# Patient Record
Sex: MALE | Race: BLACK OR AFRICAN AMERICAN | NOT HISPANIC OR LATINO | Employment: FULL TIME | URBAN - METROPOLITAN AREA
[De-identification: names, ages, dates, MRNs, and addresses within clinical notes are randomized per-mention and may not be internally consistent; named-entity substitution may affect disease eponyms.]

---

## 2018-05-15 ENCOUNTER — HOSPITAL ENCOUNTER (OUTPATIENT)
Dept: RADIOLOGY | Facility: HOSPITAL | Age: 53
Discharge: HOME | End: 2018-05-15
Attending: INTERNAL MEDICINE
Payer: COMMERCIAL

## 2018-05-15 ENCOUNTER — OFFICE VISIT (OUTPATIENT)
Dept: INTERNAL MEDICINE | Facility: CLINIC | Age: 53
End: 2018-05-15
Payer: COMMERCIAL

## 2018-05-15 ENCOUNTER — TELEPHONE (OUTPATIENT)
Dept: INTERNAL MEDICINE | Facility: CLINIC | Age: 53
End: 2018-05-15

## 2018-05-15 VITALS
HEIGHT: 74 IN | HEART RATE: 59 BPM | BODY MASS INDEX: 24.36 KG/M2 | OXYGEN SATURATION: 98 % | WEIGHT: 189.81 LBS | SYSTOLIC BLOOD PRESSURE: 138 MMHG | DIASTOLIC BLOOD PRESSURE: 84 MMHG | TEMPERATURE: 98 F

## 2018-05-15 DIAGNOSIS — M54.9 BACK PAIN, UNSPECIFIED BACK LOCATION, UNSPECIFIED BACK PAIN LATERALITY, UNSPECIFIED CHRONICITY: Primary | ICD-10-CM

## 2018-05-15 DIAGNOSIS — M54.9 BACK PAIN, UNSPECIFIED BACK LOCATION, UNSPECIFIED BACK PAIN LATERALITY, UNSPECIFIED CHRONICITY: ICD-10-CM

## 2018-05-15 LAB
BILIRUB SERPL-MCNC: NORMAL MG/DL
BLOOD URINE, POC: NORMAL
COLOR, POC UA: YELLOW
GLUCOSE UR QL STRIP: NORMAL
KETONES UR QL STRIP: NORMAL
LEUKOCYTE ESTERASE URINE, POC: NORMAL
NITRITE, POC UA: NORMAL
PH, POC UA: 5
PROTEIN, POC: NORMAL
SPECIFIC GRAVITY, POC UA: 1.02
UROBILINOGEN, POC UA: NORMAL

## 2018-05-15 PROCEDURE — 72110 X-RAY EXAM L-2 SPINE 4/>VWS: CPT | Mod: 26,,, | Performed by: RADIOLOGY

## 2018-05-15 PROCEDURE — 72110 X-RAY EXAM L-2 SPINE 4/>VWS: CPT | Mod: TC

## 2018-05-15 PROCEDURE — 99202 OFFICE O/P NEW SF 15 MIN: CPT | Mod: 25,S$GLB,, | Performed by: INTERNAL MEDICINE

## 2018-05-15 PROCEDURE — 99999 PR PBB SHADOW E&M-EST. PATIENT-LVL IV: CPT | Mod: PBBFAC,,, | Performed by: INTERNAL MEDICINE

## 2018-05-15 PROCEDURE — 3008F BODY MASS INDEX DOCD: CPT | Mod: CPTII,S$GLB,, | Performed by: INTERNAL MEDICINE

## 2018-05-15 PROCEDURE — 81002 URINALYSIS NONAUTO W/O SCOPE: CPT | Mod: S$GLB,,, | Performed by: INTERNAL MEDICINE

## 2018-05-15 RX ORDER — CYCLOBENZAPRINE HCL 10 MG
10 TABLET ORAL NIGHTLY
Qty: 30 TABLET | Refills: 1 | Status: SHIPPED | OUTPATIENT
Start: 2018-05-15 | End: 2018-06-14
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
## 2018-05-16 ENCOUNTER — TELEPHONE (OUTPATIENT)
Dept: INTERNAL MEDICINE | Facility: CLINIC | Age: 53
End: 2018-05-16
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
## 2018-05-28 ENCOUNTER — OFFICE VISIT (OUTPATIENT)
Dept: SPINE | Facility: CLINIC | Age: 53
End: 2018-05-28
Payer: COMMERCIAL

## 2018-05-28 VITALS
HEART RATE: 72 BPM | DIASTOLIC BLOOD PRESSURE: 101 MMHG | HEIGHT: 74 IN | WEIGHT: 189 LBS | SYSTOLIC BLOOD PRESSURE: 160 MMHG | BODY MASS INDEX: 24.26 KG/M2

## 2018-05-28 DIAGNOSIS — M43.10 ACQUIRED SPONDYLOLISTHESIS: ICD-10-CM

## 2018-05-28 DIAGNOSIS — G89.29 CHRONIC BILATERAL LOW BACK PAIN WITHOUT SCIATICA: ICD-10-CM

## 2018-05-28 DIAGNOSIS — M47.816 SPONDYLOSIS OF LUMBAR REGION WITHOUT MYELOPATHY OR RADICULOPATHY: ICD-10-CM

## 2018-05-28 DIAGNOSIS — M51.37 DDD (DEGENERATIVE DISC DISEASE), LUMBOSACRAL: ICD-10-CM

## 2018-05-28 DIAGNOSIS — M54.50 CHRONIC BILATERAL LOW BACK PAIN WITHOUT SCIATICA: ICD-10-CM

## 2018-05-28 PROCEDURE — 99999 PR PBB SHADOW E&M-EST. PATIENT-LVL III: CPT | Mod: PBBFAC,,, | Performed by: PHYSICIAN ASSISTANT

## 2018-05-28 PROCEDURE — 99203 OFFICE O/P NEW LOW 30 MIN: CPT | Mod: S$GLB,,, | Performed by: PHYSICIAN ASSISTANT

## 2018-05-28 PROCEDURE — 3008F BODY MASS INDEX DOCD: CPT | Mod: CPTII,S$GLB,, | Performed by: PHYSICIAN ASSISTANT

## 2018-05-28 NOTE — PATIENT INSTRUCTIONS
Treatment options for your back pain include:     - Revisiting physical therapy (not Healthy Back program). They can do exercises but also do treatments such as dry needling, ultrasound, and electrical stimulation.     - Consider injections- facet injections at last two levels. I have printed out information about these.     - Continue on current medications and not do anything else.       Let me know if you have any further questions.     Facet Joint Injection  Back or neck pain may be caused by a problem with your facet joints. If so, a facet joint injection may help. With this treatment, medicine is injected into certain facet joints. The injection can help your doctor find problem joints. It may also relieve your pain.    What is a facet joint?  Bones called vertebrae make up your spine. Each vertebra has facets (flat surfaces) that touch where the vertebrae fit together. These form a structure called a facet joint on each side of the vertebrae.  What is a facet joint injection?  One or more facet joints in your back or neck can become inflamed (swollen and irritated). This may cause pain. During a facet joint injection, medicine is injected into the inflamed joints. This treatment may help reduce inflammation and relieve pain. Pain relief may last for weeks to months or longer. If the pain returns, you may need a repeat injection.    Getting ready  To get ready for your treatment, do the following:  · At least a week before treatment, tell your healthcare provider what medicines you take. This includes aspirin. Ask whether you should stop taking any of them before treatment.  · Tell your provider if you are pregnant or allergic to any medicines.  · Follow any directions you are given for not eating or drinking before the treatment.  · If asked, bring X-rays, MRIs, or other tests with you on the day of your treatment.  Date Last Reviewed: 9/21/2015  © 2564-6221 [employer]. [address] [place] [address],  [place] [address]. All rights reserved. This information is not intended as a substitute for professional medical care. Always follow your healthcare professional's instructions.        Facet Joint Injection: Your Experience  The treatment is done in a hospital, surgery center, or a doctor's office. Youll be asked to fill out some forms, including a consent form. You may also be examined.  During the procedure  You may be given medicine to help you relax. You will lie on an exam table on your stomach. During your treatment:  · The skin over the injection site is cleaned. A pain medicine (local anesthetic) numbs the skin.  · X-ray imaging (fluoroscopy) may be used to help the doctor see your spine. If so, a contrast dye may be injected into the affected area.  · The injection is given. It may contain a local anesthetic to numb the region around the joint, medicine that eases inflammation (steroids), or both.  After the procedure  Most often, you can go home shortly after the procedure, generally in about an hour. Have an adult friend or relative drive you. The anesthetic wears off in a few hours. When it does, your back or neck may feel more sore than usual. This is normal. Take it easy for the rest of the day. The steroids most often begin to work in a few days. Your provider can tell you when its OK to go back to work.  Risks and complications  Risks and complications are rare, but can include:  · Infection  · Bleeding  · Prolonged increase in pain  · Nerve damage (very rare)   When to call your healthcare provider  Call your provider if you have any of these:  · Severe headaches  · Fever over 100.4°F (38°C), chills, redness or drainage at the injection site  · Weakness, tingling, or numbness in your arms or legs   Date Last Reviewed: 9/21/2015 © 2000-2017 [employer]. [address] [place] [address], [place] [address]. All rights reserved. This information is not intended as a substitute for professional  medical care. Always follow your healthcare professional's instructions.

## 2018-05-28 NOTE — LETTER
May 28, 2018      Kristan Gentile MD  [address]  [facility] [address]           [facility]  [address], [address]  [facility] [address]  Phone: [phone]  Fax: [phone]          Patient: Tobias Capellan   MR Number: [id number]   YOB: 1965   Date of Visit: 5/28/2018       Dear Dr. Kristan Gentile:    Thank you for referring Tobias Capellan to me for evaluation. Attached you will find relevant portions of my assessment and plan of care.    If you have questions, please do not hesitate to call me. I look forward to following Tobias Capellan along with you.    Sincerely,    Berkley Chandler PA-C    Enclosure  CC:  No Recipients    If you would like to receive this communication electronically, please contact [email or web address] or [phone] to request more information on [employer] access.    For providers and/or their staff who would like to refer a patient to [facility], please contact us through our one-stop-shop provider referral line, [facility] , at [phone].    If you feel you have received this communication in error or would no longer like to receive these types of communications, please e-mail [email or web address]

## 2019-02-05 ENCOUNTER — HOSPITAL ENCOUNTER (EMERGENCY)
Facility: OTHER | Age: 54
Discharge: HOME | End: 2019-02-05
Attending: EMERGENCY MEDICINE
Payer: COMMERCIAL

## 2019-02-05 VITALS
HEART RATE: 74 BPM | TEMPERATURE: 98 F | SYSTOLIC BLOOD PRESSURE: 187 MMHG | HEIGHT: 74 IN | RESPIRATION RATE: 14 BRPM | OXYGEN SATURATION: 98 % | WEIGHT: 195 LBS | DIASTOLIC BLOOD PRESSURE: 99 MMHG | BODY MASS INDEX: 25.03 KG/M2

## 2019-02-05 DIAGNOSIS — L03.012 PARONYCHIA OF LEFT THUMB: Primary | ICD-10-CM

## 2019-02-05 PROCEDURE — 99284 EMERGENCY DEPT VISIT MOD MDM: CPT | Mod: 25

## 2019-02-05 PROCEDURE — 25000003 PHARM REV CODE 250: Performed by: PHYSICIAN ASSISTANT

## 2019-02-05 PROCEDURE — 10061 I&D ABSCESS COMP/MULTIPLE: CPT | Mod: FA

## 2019-02-05 RX ORDER — MUPIROCIN 20 MG/G
1 OINTMENT TOPICAL
Status: COMPLETED | OUTPATIENT
Start: 2019-02-05 | End: 2019-02-05

## 2019-02-05 RX ORDER — SULFAMETHOXAZOLE AND TRIMETHOPRIM 800; 160 MG/1; MG/1
1 TABLET ORAL 2 TIMES DAILY
Qty: 14 TABLET | Refills: 0 | Status: SHIPPED | OUTPATIENT
Start: 2019-02-05 | End: 2019-02-12

## 2019-02-05 RX ORDER — IBUPROFEN 600 MG/1
600 TABLET ORAL EVERY 6 HOURS PRN
Qty: 20 TABLET | Refills: 0 | Status: SHIPPED | OUTPATIENT
Start: 2019-02-05 | End: 2020-10-14

## 2019-02-05 RX ORDER — LIDOCAINE HYDROCHLORIDE 10 MG/ML
10 INJECTION, SOLUTION EPIDURAL; INFILTRATION; INTRACAUDAL; PERINEURAL
Status: COMPLETED | OUTPATIENT
Start: 2019-02-05 | End: 2019-02-05

## 2019-02-05 RX ORDER — CEPHALEXIN 500 MG/1
500 CAPSULE ORAL 4 TIMES DAILY
Qty: 20 CAPSULE | Refills: 0 | Status: SHIPPED | OUTPATIENT
Start: 2019-02-05 | End: 2019-02-10

## 2019-02-05 RX ADMIN — LIDOCAINE HYDROCHLORIDE 100 MG: 10 INJECTION, SOLUTION EPIDURAL; INFILTRATION; INTRACAUDAL; PERINEURAL at 01:02

## 2019-02-05 RX ADMIN — MUPIROCIN 22 G: 20 OINTMENT TOPICAL at 01:02

## 2019-02-05 NOTE — ED PROVIDER NOTES
Encounter Date: 2/5/2019       History     Chief Complaint   Patient presents with    paronychia     Pt CO pain and swellint to left thumb Xs 1 week.      Patient is 54 year old male who presents with complaints of left thumb pain and swelling. He reports that this thumb is been painful and swollen for a week.  He was seen by an urgent care was given an antibiotic but no I and D was performed.  He reports he has been taking the antibiotic but the thumb is only gotten worse.  He admits that he tried to drain the thumb himself at home with no result.  He is unsure of which specific antibiotic he has been taking.  He has not been taking any medications for pain. He reports no associated fever, chills, nausea or vomiting.  He is currently unaccompanied in the ER.          Review of patient's allergies indicates:  No Known Allergies  Past Medical History:   Diagnosis Date    Glaucoma      Past Surgical History:   Procedure Laterality Date    EYE SURGERY       History reviewed. No pertinent family history.  Social History     Tobacco Use    Smoking status: Never Smoker    Smokeless tobacco: Never Used   Substance Use Topics    Alcohol use: Yes     Comment: social    Drug use: No     Review of Systems   Constitutional: Negative for fever.   HENT: Negative for sore throat.    Respiratory: Negative for shortness of breath.    Cardiovascular: Negative for chest pain.   Gastrointestinal: Negative for nausea.   Genitourinary: Negative for dysuria.   Musculoskeletal: Negative for back pain.        Left thumb pain swelling   Skin: Negative for rash.   Neurological: Negative for weakness.   Hematological: Does not bruise/bleed easily.       Physical Exam     Initial Vitals [02/05/19 0057]   BP Pulse Resp Temp SpO2   (!) 187/99 74 14 97.7 °F (36.5 °C) 98 %      MAP       --         Physical Exam    Nursing note and vitals reviewed.  Constitutional: He appears well-developed and well-nourished. He is not diaphoretic. No  distress.   Healthy-appearing male in no acute distress or apparent pain.  He makes good eye contact, speaks in clear full sentences and ambulates with ease.   HENT:   Head: Normocephalic and atraumatic.   Right Ear: External ear normal.   Eyes: Conjunctivae and EOM are normal. Pupils are equal, round, and reactive to light. Right eye exhibits no discharge. Left eye exhibits no discharge. No scleral icterus.   Neck: Normal range of motion.   Cardiovascular: Normal rate, regular rhythm, normal heart sounds and intact distal pulses. Exam reveals no gallop and no friction rub.    No murmur heard.  Pulmonary/Chest: Breath sounds normal. He has no wheezes. He has no rhonchi. He has no rales.   Abdominal: Soft. Bowel sounds are normal. There is no tenderness. There is no rebound and no guarding.   Musculoskeletal: Normal range of motion. He exhibits no edema or tenderness.   Left thumb has paronychia on the lateral aspect of the nail border with exquisite tenderness to palpation fluctuance.  There is good capillary refill and sensation to light touch.  DIP and MCP are unaffected.  Remainder of hand exam is normal.   Lymphadenopathy:     He has no cervical adenopathy.   Neurological: He is alert and oriented to person, place, and time. He has normal strength. No cranial nerve deficit or sensory deficit. GCS score is 15. GCS eye subscore is 4. GCS verbal subscore is 5. GCS motor subscore is 6.   Skin: Skin is warm. Capillary refill takes less than 2 seconds. No rash and no abscess noted. No erythema.   Psychiatric: He has a normal mood and affect. His behavior is normal. Thought content normal.         ED Course   I & D - Incision and Drainage  Date/Time: 2/5/2019 2:17 AM  Performed by: Carmen Rebolledo PA-C  Authorized by: Cole Angeles MD   Consent Done: Not Needed  Type: abscess (Left thumb paronychia)  Body area: upper extremity  Location details: left thumb  Anesthesia: digital block    Anesthesia:  Local  Anesthetic: lidocaine 1% without epinephrine  Anesthetic total: 3 mL  Patient sedated: no  Scalpel size: 11  Incision type: single straight  Complexity: complex  Drainage: pus,  purulent and  bloody  Drainage amount: copious  Wound treatment: incision,  wound left open and  drainage  Complications: No  Specimens: No  Implants: No  Patient tolerance: Patient tolerated the procedure well with no immediate complications        Labs Reviewed - No data to display       Imaging Results    None          Medical Decision Making:   ED Management:  Urgent evaluation of a 54-year-old male who presents with complaints consistent with paronychia of left thumb.  He is afebrile, nontoxic appearing, hemodynamically stable.  Physical exam outlined above and reveals evidence of paronychia requiring I and D. No concern for flexor tenosynovitis or hand cellulitis.  No concern for osteomyelitis.  No imaging felt warranted at this time.  I and D is performed with copious amounts of purulent drainage expressed.  Will cover with Bactrim Keflex and topical Bactroban.  Will encourage patient to discontinue antibiotic these previously been taking with no relief and to change course of action Keflex unless antibiotic regimen is exactly the same.  He is given anti-inflammatories to help with pain.  He is educated on importance of follow-up with hand specialist for monitoring of wound improvement.  He is educated on ED return precautions verbalized understanding.  He is stable for discharge.                      Clinical Impression:   The encounter diagnosis was Paronychia of left thumb.                             Carmen Rebolledo PA-C  02/05/19 0219

## 2019-02-05 NOTE — ED TRIAGE NOTES
Pt arrives to ED with c/o paronychia with onset 1 week ago. Pt reports going to clinic on Friday and being prescribed antibiotics, pt reports taking antibiotics as prescribed and swelling has increased. Pt sitting in chair, repsiraitons even, unlabored, eyes open spontaneously, NAD noted, call bell within reach, answering questions appropriately.

## 2019-03-30 ENCOUNTER — HOSPITAL ENCOUNTER (EMERGENCY)
Facility: OTHER | Age: 54
Discharge: HOME | End: 2019-03-30
Attending: EMERGENCY MEDICINE
Payer: COMMERCIAL

## 2019-03-30 VITALS
HEIGHT: 74 IN | SYSTOLIC BLOOD PRESSURE: 169 MMHG | HEART RATE: 65 BPM | BODY MASS INDEX: 24.47 KG/M2 | WEIGHT: 190.69 LBS | DIASTOLIC BLOOD PRESSURE: 99 MMHG | OXYGEN SATURATION: 99 % | TEMPERATURE: 98 F | RESPIRATION RATE: 18 BRPM

## 2019-03-30 DIAGNOSIS — S05.01XA ABRASION OF RIGHT CORNEA, INITIAL ENCOUNTER: Primary | ICD-10-CM

## 2019-03-30 PROCEDURE — 25000003 PHARM REV CODE 250: Performed by: PHYSICIAN ASSISTANT

## 2019-03-30 PROCEDURE — 99284 EMERGENCY DEPT VISIT MOD MDM: CPT

## 2019-03-30 RX ORDER — MOXIFLOXACIN 5 MG/ML
1 SOLUTION/ DROPS OPHTHALMIC 3 TIMES DAILY
Qty: 3 ML | Refills: 0 | Status: SHIPPED | OUTPATIENT
Start: 2019-03-30 | End: 2021-03-19

## 2019-03-30 RX ORDER — PROPARACAINE HYDROCHLORIDE 5 MG/ML
2 SOLUTION/ DROPS OPHTHALMIC
Status: COMPLETED | OUTPATIENT
Start: 2019-03-30 | End: 2019-03-30

## 2019-03-30 RX ADMIN — PROPARACAINE HYDROCHLORIDE 2 DROP: 5 SOLUTION/ DROPS OPHTHALMIC at 08:03

## 2019-03-30 RX ADMIN — FLUORESCEIN SODIUM 1 EACH: 1 STRIP OPHTHALMIC at 08:03

## 2019-03-31 NOTE — ED PROVIDER NOTES
Encounter Date: 3/30/2019       History     Chief Complaint   Patient presents with    Eye Injury     while cutting willis, bamboo went in his R eye     Patient is a 54-year-old male presenting to the emergency department with complaints of right eye pain. The patient states that prior to arrival, he was lying some floors and cutting some when a piece of bamboo flew into his right eye.  He admits he was wearing protective goggles at the time.  He states that since then he has had significant pain in his right eye and cannot open it.  He denies any prior episode.  He admits that he has a history of glaucoma.This is the extent of the patient's complaints at this time.       The history is provided by the patient.     Review of patient's allergies indicates:  No Known Allergies  Past Medical History:   Diagnosis Date    Glaucoma      Past Surgical History:   Procedure Laterality Date    EYE SURGERY       History reviewed. No pertinent family history.  Social History     Tobacco Use    Smoking status: Never Smoker    Smokeless tobacco: Never Used   Substance Use Topics    Alcohol use: Yes     Comment: social    Drug use: No     Review of Systems   Constitutional: Negative for activity change, chills, fatigue and fever.   HENT: Negative for congestion, rhinorrhea and sore throat.    Eyes: Positive for pain and visual disturbance. Negative for photophobia.   Respiratory: Negative for cough and shortness of breath.    Cardiovascular: Negative for chest pain.   Gastrointestinal: Negative for abdominal pain, diarrhea, nausea and vomiting.   Genitourinary: Negative for dysuria, hematuria and urgency.   Musculoskeletal: Negative for back pain, myalgias and neck pain.   Skin: Negative for color change and wound.   Neurological: Negative for weakness and headaches.   Psychiatric/Behavioral: Negative for agitation and confusion.       Physical Exam     Initial Vitals [03/30/19 2012]   BP Pulse Resp Temp SpO2   (!)  178/110 71 20 97.9 °F (36.6 °C) 98 %      MAP       --         Physical Exam    Nursing note and vitals reviewed.  Constitutional: Vital signs are normal. He appears well-developed and well-nourished. He is not diaphoretic.  Non-toxic appearance. He does not have a sickly appearance. He does not appear ill. No distress.   Uncomfortable appearing  male unaccompanied in the emergency room.  Speaking clearly in full sentences.  No acute distress.   HENT:   Head: Normocephalic and atraumatic.   Right Ear: External ear normal.   Left Ear: External ear normal.   Nose: Nose normal.   Mouth/Throat: Oropharynx is clear and moist.   Eyes: EOM are normal. Pupils are equal, round, and reactive to light. Lids are everted and swept, no foreign bodies found.   Valve in place to the right eye to the superior aspect with deformity noted, baseline. Conjunctival abrasion noted to the inferior aspect. Normal EOM. Eyelid everted with no obvious foreign body. IOP of the right eye is 16mmHg, 19mmHg, 18mmHg.    Neck: Normal range of motion. Neck supple.   Musculoskeletal: Normal range of motion.   Neurological: He is alert and oriented to person, place, and time. GCS eye subscore is 4. GCS verbal subscore is 5. GCS motor subscore is 6.   Skin: Skin is warm.   Psychiatric: He has a normal mood and affect. His behavior is normal. Judgment and thought content normal.         ED Course   Procedures  Labs Reviewed - No data to display          Medical Decision Making:   Initial Assessment:   Urgent evaluation of a 54-year-old male presenting to the emergency department with complaints of right eye discomfort.  Patient is afebrile, nontoxic appearing, hemodynamically stable. Initial physical exam is limited due to patient's discomfort.  Will plan for further exam after proparacaine and reassess.  ED Management:  Right eye is examined with fluorescein dye proparacaine, reveals evidence of a corneal abrasion.  Eyelid louise is in  soaps, no evidence obvious foreign body intra-ocular pressure is normal. Visual acuity is assessed, 20/100 in the right eye, 20/50 in the left eye, 20/20 in both.  At this point, do not feel any further testing or imaging is warranted.  Patient is prescribed Vigamox as well as artificial tears.  He is counseled on symptomatic care and treatment.  Urged to obtain close follow-up with his ophthalmologist at [facility].  Discharged home in stable condition. The patient was instructed to follow up with a primary care provider in 2 days or to return to the emergency department for worsening symptoms. The treatment plan was discussed with the patient who demonstrated understanding and comfort with plan. The patient's history, physical exam, and plan of care was discussed with and agreed upon with my supervising physician.         This note was created using [employer] Fluency Direct. There may be typographical errors secondary to dictation.                         Clinical Impression:     1. Abrasion of right cornea, initial encounter         Disposition:   Disposition: Discharged  Condition: Stable                        Raquel Gutiérrez PA-C  03/30/19 0803

## 2019-03-31 NOTE — ED TRIAGE NOTES
"+right eye pain/reddness. Pt states " I was working on some floor and bamboo got in my right eye" pt denies vision loss. Pt has hx of glaucoma   "

## 2019-05-28 NOTE — PROGRESS NOTES
Subjective:       Patient ID: Tobias Capellan is a 54 y.o. male with a PMH significant for HTN, Open Angle Glaucoma, LBP and Right Knee Pain who presents today to establish care.  Patient last in primary care at [facility] 6 months ago.  Patient is an .    Chief Complaint: Establish Care    HPI   Patient wants to see an Ophthalmologist for Glaucoma.  Patient otherwise without complaints.  Patient denies f/c, n/v/d.  No chest pain or SOB.  No abdominal pain or dysuria.  No headaches or change in vision.  No dizziness.  No significant  weight gain or weight loss.  Remaining ROS negative.    Review of Systems   Constitutional: Negative for appetite change, diaphoresis, fatigue and unexpected weight change.   HENT: Negative for congestion, ear pain, hearing loss, rhinorrhea, sinus pressure, sore throat, trouble swallowing and voice change.    Eyes: Negative for photophobia, pain and visual disturbance.   Respiratory: Negative for cough, chest tightness, shortness of breath and wheezing.    Cardiovascular: Negative for chest pain, palpitations and leg swelling.   Gastrointestinal: Negative for abdominal pain, blood in stool, constipation, diarrhea, nausea and vomiting.   Endocrine: Negative for cold intolerance, heat intolerance, polydipsia, polyphagia and polyuria.   Genitourinary: Negative for decreased urine volume, difficulty urinating, discharge, dysuria, flank pain, hematuria, scrotal swelling, testicular pain and urgency.   Musculoskeletal: Positive for neck pain. Negative for arthralgias, back pain and myalgias.   Skin: Negative for rash.   Neurological: Negative for dizziness, tremors, syncope, weakness, numbness and headaches.   Hematological: Does not bruise/bleed easily.   Psychiatric/Behavioral: Negative for agitation, confusion and sleep disturbance. The patient is not nervous/anxious.        Objective:      Physical Exam   Constitutional: He is oriented to person, place, and time. He  appears well-developed and well-nourished.   HENT:   Head: Normocephalic.   Nose: Nose normal.   Mouth/Throat: Oropharynx is clear and moist.   Eyes: Pupils are equal, round, and reactive to light. Conjunctivae and EOM are normal. Right eye exhibits no discharge. Left eye exhibits no discharge. No scleral icterus.   Neck: Normal range of motion. Neck supple. No thyromegaly present.   Cardiovascular: Normal rate, regular rhythm, normal heart sounds and intact distal pulses. Exam reveals no gallop and no friction rub.   No murmur heard.  Pulmonary/Chest: Effort normal and breath sounds normal. No respiratory distress. He has no wheezes. He has no rales.   Abdominal: Soft. Bowel sounds are normal. He exhibits no distension. There is no tenderness. There is no rebound and no guarding.   Musculoskeletal: He exhibits no edema.   Lymphadenopathy:     He has no cervical adenopathy.   Neurological: He is alert and oriented to person, place, and time. No cranial nerve deficit or sensory deficit.   Skin: Skin is warm and dry. No rash noted. No erythema.   Psychiatric: He has a normal mood and affect. His behavior is normal. Thought content normal.       Assessment:       1. Annual physical exam    2. Primary open angle glaucoma of both eyes, unspecified glaucoma stage    3. Essential hypertension    4. Prostate cancer screening    5. Screen for STD (sexually transmitted disease)        Plan:   -Today's Visit - patient is awake and alert.      -Cards - HTN - BP today is elevated at 160/100 with repeat by me at 180/102.  Never been on medications in past.  Was high at his Spine Visit in 5/2018.  He takes his BP periodically at home - 131/89.  He is declining medications today - he wishes to monitor at home and return in 1 week for BP check.  Discussed risk and benefits of uncontrolled HTN.  Will check EKG today.    TTE in 2016 with normal LVSF with EF of >55%, normal RVSF, normal LVDF.    -Psych - Anxiety and Stress - states  "unable to relax.  Advise to start exercising once his BP has improved.    -MSK - Cervical Disease - s/p fracture of 3 vertebra. No surgery.  Given neck brace.  Has tightness of neck.  He declines Spine referral for this at this time.    LBP - saw Spine Service in 5/2018 - "Chronic intermittent LBP left > right with no leg pain. Known slip L4-L5 with facet hypertrophy and mild DDD L4-S1. Pain likely multifactorial and from facets with myofascial component. Treatment options reviewed with patient along with above lumbar XRs. Following plan made:   - Recommend he revisit PT for lumbar spine with all modalities.   - Continue naproxen and flexeril from PCP.   - Consider bilateral L4-L5 and L5-S1 facet injections with pain management.   - Above options written out for patient and he was given information on injections.   - He will call/email me when he decides how he wants to proceed.   - Of note /101 initially. No symptoms. 160/90 manually at end of visit. Recommend he f/u with PCP regarding this".     -Eye - Glaucoma - on Latanoprost and Combigan drops.  S/p surgery in both eyes.  Will refer to Ophthalmology.    -GI -   We discussed Colon Cancer Screening - colonoscopy last done 2 years ago.  Told it was negative and told to repeat in 10 years..      - - We discussed Prostate cancer screening - will check PSA.     -HCM - Discussed Flu and Tdap (8/2018) Vaccines.  We discussed Shingles (waiting for Shingrix Availability) vaccinations.    -Follow Up - 6 months.  Sooner depending on BP and lab results.  "

## 2019-05-29 ENCOUNTER — OFFICE VISIT (OUTPATIENT)
Dept: PRIMARY CARE CLINIC | Facility: CLINIC | Age: 54
End: 2019-05-29
Payer: COMMERCIAL

## 2019-05-29 ENCOUNTER — LAB VISIT (OUTPATIENT)
Dept: LAB | Facility: HOSPITAL | Age: 54
End: 2019-05-29
Attending: INTERNAL MEDICINE
Payer: COMMERCIAL

## 2019-05-29 VITALS
HEIGHT: 74 IN | WEIGHT: 186.75 LBS | SYSTOLIC BLOOD PRESSURE: 180 MMHG | DIASTOLIC BLOOD PRESSURE: 102 MMHG | OXYGEN SATURATION: 99 % | BODY MASS INDEX: 23.97 KG/M2 | HEART RATE: 104 BPM

## 2019-05-29 DIAGNOSIS — I10 ESSENTIAL HYPERTENSION: ICD-10-CM

## 2019-05-29 DIAGNOSIS — Z12.5 PROSTATE CANCER SCREENING: ICD-10-CM

## 2019-05-29 DIAGNOSIS — Z00.00 ANNUAL PHYSICAL EXAM: ICD-10-CM

## 2019-05-29 DIAGNOSIS — H40.1130 PRIMARY OPEN ANGLE GLAUCOMA OF BOTH EYES, UNSPECIFIED GLAUCOMA STAGE: ICD-10-CM

## 2019-05-29 DIAGNOSIS — Z00.00 ANNUAL PHYSICAL EXAM: Primary | ICD-10-CM

## 2019-05-29 DIAGNOSIS — Z11.3 SCREEN FOR STD (SEXUALLY TRANSMITTED DISEASE): ICD-10-CM

## 2019-05-29 LAB
25(OH)D3+25(OH)D2 SERPL-MCNC: 19 NG/ML (ref 30–96)
ALBUMIN SERPL BCP-MCNC: 3.8 G/DL (ref 3.5–5.2)
ALP SERPL-CCNC: 68 U/L (ref 55–135)
ALT SERPL W/O P-5'-P-CCNC: 20 U/L (ref 10–44)
ANION GAP SERPL CALC-SCNC: 5 MMOL/L (ref 8–16)
AST SERPL-CCNC: 22 U/L (ref 10–40)
BASOPHILS # BLD AUTO: 0.01 K/UL (ref 0–0.2)
BASOPHILS NFR BLD: 0.3 % (ref 0–1.9)
BILIRUB SERPL-MCNC: 0.7 MG/DL (ref 0.1–1)
BUN SERPL-MCNC: 9 MG/DL (ref 6–20)
CALCIUM SERPL-MCNC: 9.4 MG/DL (ref 8.7–10.5)
CHLORIDE SERPL-SCNC: 106 MMOL/L (ref 95–110)
CHOLEST SERPL-MCNC: 159 MG/DL (ref 120–199)
CHOLEST/HDLC SERPL: 2.7 {RATIO} (ref 2–5)
CO2 SERPL-SCNC: 30 MMOL/L (ref 23–29)
COMPLEXED PSA SERPL-MCNC: 3.5 NG/ML (ref 0–4)
CREAT SERPL-MCNC: 1.2 MG/DL (ref 0.5–1.4)
DIFFERENTIAL METHOD: ABNORMAL
EOSINOPHIL # BLD AUTO: 0.2 K/UL (ref 0–0.5)
EOSINOPHIL NFR BLD: 6 % (ref 0–8)
ERYTHROCYTE [DISTWIDTH] IN BLOOD BY AUTOMATED COUNT: 11.9 % (ref 11.5–14.5)
EST. GFR  (AFRICAN AMERICAN): >60 ML/MIN/1.73 M^2
EST. GFR  (NON AFRICAN AMERICAN): >60 ML/MIN/1.73 M^2
GLUCOSE SERPL-MCNC: 103 MG/DL (ref 70–110)
HCT VFR BLD AUTO: 41.3 % (ref 40–54)
HDLC SERPL-MCNC: 60 MG/DL (ref 40–75)
HDLC SERPL: 37.7 % (ref 20–50)
HGB BLD-MCNC: 13.6 G/DL (ref 14–18)
HIV 1+2 AB+HIV1 P24 AG SERPL QL IA: NEGATIVE
IMM GRANULOCYTES # BLD AUTO: 0.01 K/UL (ref 0–0.04)
IMM GRANULOCYTES NFR BLD AUTO: 0.3 % (ref 0–0.5)
LDLC SERPL CALC-MCNC: 88.4 MG/DL (ref 63–159)
LYMPHOCYTES # BLD AUTO: 1.1 K/UL (ref 1–4.8)
LYMPHOCYTES NFR BLD: 32.5 % (ref 18–48)
MCH RBC QN AUTO: 29.6 PG (ref 27–31)
MCHC RBC AUTO-ENTMCNC: 32.9 G/DL (ref 32–36)
MCV RBC AUTO: 90 FL (ref 82–98)
MONOCYTES # BLD AUTO: 0.3 K/UL (ref 0.3–1)
MONOCYTES NFR BLD: 9.5 % (ref 4–15)
NEUTROPHILS # BLD AUTO: 1.8 K/UL (ref 1.8–7.7)
NEUTROPHILS NFR BLD: 51.4 % (ref 38–73)
NONHDLC SERPL-MCNC: 99 MG/DL
NRBC BLD-RTO: 0 /100 WBC
PLATELET # BLD AUTO: 240 K/UL (ref 150–350)
PMV BLD AUTO: 10.3 FL (ref 9.2–12.9)
POTASSIUM SERPL-SCNC: 4 MMOL/L (ref 3.5–5.1)
PROT SERPL-MCNC: 7.4 G/DL (ref 6–8.4)
RBC # BLD AUTO: 4.59 M/UL (ref 4.6–6.2)
SODIUM SERPL-SCNC: 141 MMOL/L (ref 136–145)
TRIGL SERPL-MCNC: 53 MG/DL (ref 30–150)
TSH SERPL DL<=0.005 MIU/L-ACNC: 1.26 UIU/ML (ref 0.4–4)
WBC # BLD AUTO: 3.48 K/UL (ref 3.9–12.7)

## 2019-05-29 PROCEDURE — 99214 OFFICE O/P EST MOD 30 MIN: CPT | Mod: 25,S$GLB,, | Performed by: INTERNAL MEDICINE

## 2019-05-29 PROCEDURE — 80061 LIPID PANEL: CPT

## 2019-05-29 PROCEDURE — 93005 EKG 12-LEAD: ICD-10-PCS | Mod: S$GLB,,, | Performed by: INTERNAL MEDICINE

## 2019-05-29 PROCEDURE — 3077F SYST BP >= 140 MM HG: CPT | Mod: CPTII,S$GLB,, | Performed by: INTERNAL MEDICINE

## 2019-05-29 PROCEDURE — 84443 ASSAY THYROID STIM HORMONE: CPT

## 2019-05-29 PROCEDURE — 85025 COMPLETE CBC W/AUTO DIFF WBC: CPT

## 2019-05-29 PROCEDURE — 86703 HIV-1/HIV-2 1 RESULT ANTBDY: CPT

## 2019-05-29 PROCEDURE — 93005 ELECTROCARDIOGRAM TRACING: CPT | Mod: S$GLB,,, | Performed by: INTERNAL MEDICINE

## 2019-05-29 PROCEDURE — 3008F BODY MASS INDEX DOCD: CPT | Mod: CPTII,S$GLB,, | Performed by: INTERNAL MEDICINE

## 2019-05-29 PROCEDURE — 3080F DIAST BP >= 90 MM HG: CPT | Mod: CPTII,S$GLB,, | Performed by: INTERNAL MEDICINE

## 2019-05-29 PROCEDURE — 84153 ASSAY OF PSA TOTAL: CPT

## 2019-05-29 PROCEDURE — 36415 COLL VENOUS BLD VENIPUNCTURE: CPT | Mod: PN

## 2019-05-29 PROCEDURE — 3077F PR MOST RECENT SYSTOLIC BLOOD PRESSURE >= 140 MM HG: ICD-10-PCS | Mod: CPTII,S$GLB,, | Performed by: INTERNAL MEDICINE

## 2019-05-29 PROCEDURE — 82306 VITAMIN D 25 HYDROXY: CPT

## 2019-05-29 PROCEDURE — 99999 PR PBB SHADOW E&M-EST. PATIENT-LVL III: ICD-10-PCS | Mod: PBBFAC,,, | Performed by: INTERNAL MEDICINE

## 2019-05-29 PROCEDURE — 80053 COMPREHEN METABOLIC PANEL: CPT

## 2019-05-29 PROCEDURE — 3080F PR MOST RECENT DIASTOLIC BLOOD PRESSURE >= 90 MM HG: ICD-10-PCS | Mod: CPTII,S$GLB,, | Performed by: INTERNAL MEDICINE

## 2019-05-29 PROCEDURE — 93010 EKG 12-LEAD: ICD-10-PCS | Mod: S$GLB,,, | Performed by: INTERNAL MEDICINE

## 2019-05-29 PROCEDURE — 3008F PR BODY MASS INDEX (BMI) DOCUMENTED: ICD-10-PCS | Mod: CPTII,S$GLB,, | Performed by: INTERNAL MEDICINE

## 2019-05-29 PROCEDURE — 99214 PR OFFICE/OUTPT VISIT, EST, LEVL IV, 30-39 MIN: ICD-10-PCS | Mod: 25,S$GLB,, | Performed by: INTERNAL MEDICINE

## 2019-05-29 PROCEDURE — 93010 ELECTROCARDIOGRAM REPORT: CPT | Mod: S$GLB,,, | Performed by: INTERNAL MEDICINE

## 2019-05-29 PROCEDURE — 86592 SYPHILIS TEST NON-TREP QUAL: CPT

## 2019-05-29 PROCEDURE — 99999 PR PBB SHADOW E&M-EST. PATIENT-LVL III: CPT | Mod: PBBFAC,,, | Performed by: INTERNAL MEDICINE

## 2019-05-29 NOTE — PATIENT INSTRUCTIONS
Your exam was overall normal today.    Your blood pressure is a little high today.  If think you should be on medication for hypertension.  Please monitor at home with a digital blood pressure cuff when sitting and rested for at least 15 minutes or longer.  Record your values and bring these with you on your return.  I would like you to see the nurse in 1 weeks for a blood pressure check.  Target blood pressure is less than 130/80.      I will order routine fasting labs today - at least 9 hours of fasting.    I recommend getting on a waiting list for the Shingles vaccine (Shingrix) is interested.    Return in 6 months - sooner if needed.  Please come at least 15-20 minutes before your scheduled appointment time.      Taking Your Blood Pressure  Blood pressure is the force of blood against the artery wall as it moves from the heart through the blood vessels. You can take your own blood pressure reading using a digital monitor. Take your readings the same each time, using the same arm. Take readings as often as your healthcare provider instructs.  About blood pressure monitors  Blood pressure monitors are designed for certain ages and cases. You can find monitors for older adults, for pregnant women, and for children. Make sure the one you choose is the right one for your age and situation.  The American Heart Association recommends an automatic cuff monitor that fits on your upper arm (bicep). The cuff should fit your arm size. A cuff thats too large or too small will not give an accurate reading. Measure around your upper arm to find your size.  Monitors that attach to your finger or wrist are not as accurate as monitors for your upper arm.  Ask your healthcare provider for help in choosing a monitor. Bring your monitor to your next provider visit if you need help in using it the correct way.  The steps below are general instructions for using an automatic digital monitor.  Step 1. Relax    · Take your blood  pressure at the same time every day, such as in the morning or evening, or at the time your healthcare provider recommends.  · Wait at least a half-hour after smoking, eating, or exercising. Don't drink coffee, tea, soda, or other caffeinated beverages before checking your blood pressure.  · Sit comfortably at a table with both feet on the floor. Do not cross your legs or feet. Place the monitor near you.  · Rest for a few minutes before you begin.  Step 2. Wrap the cuff    · Place your arm on the table, palm up. Your arm should be at the level of your heart. Wrap the cuff around your upper arm, just above your elbow. Its best done on bare skin, not over clothing. Most cuffs will indicate where the brachial artery (the blood vessel in the middle of the arm at the inner side of the elbow) should line up with the cuff. Look in your monitor's instruction booklet for an illustration. You can also bring your cuff to your healthcare provider and have them show you how to correctly place the cuff.  Step 3. Inflate the cuff    · Push the button that starts the pump.  · The cuff will tighten, then loosen.  · The numbers will change. When they stop changing, your blood pressure reading will appear.  · Take 2 or 3 readings one minute apart.  Step 4. Write down the results of each reading    · Write down your blood pressure numbers for each reading. Note the date and time. Keep your results in one place, such as a notebook. Even if your monitor has a built-in memory, keep a hard copy of the readings.  · Remove the cuff from your arm. Turn off the machine.  · Bring your blood pressure records with your healthcare providers at each visit.  · If you start a new blood pressure medicine, note the day you started the new medicine. Also note the day if you change the dose of your medicine. This information goes on your blood pressure recording sheet. This will help your healthcare provider monitor how well the medicine changes are  working.  · Ask your healthcare provider what numbers should prompt you to call him or her. Also ask what numbers should prompt you to get help right away.  Date Last Reviewed: 11/1/2016  © 2250-9986 [employer]. [address] [place] [address], [place] [address]. All rights reserved. This information is not intended as a substitute for professional medical care. Always follow your healthcare professional's instructions.

## 2019-05-30 LAB — RPR SER QL: NORMAL

## 2019-05-31 ENCOUNTER — TELEPHONE (OUTPATIENT)
Dept: PRIMARY CARE CLINIC | Facility: CLINIC | Age: 54
End: 2019-05-31

## 2019-05-31 NOTE — TELEPHONE ENCOUNTER
----- Message from Jairon Boogie MD sent at 5/29/2019  1:48 PM CDT -----  Please let patient know the following:    I have reviewed your recent labs and have the following recommendations:    Your Metabolic Panel is normal.  Your Blood Counts show a stable very mild anemia, but your white blood cell count and platelets are normal.  You had a mild anemia 11 years ago, so this is unchanged.  No further work-up needed at this time.  Your Thyroid Function is pending - I will notify you if this test returns as abnormal.  Your vitamin D level was low.  I recommend taking over the counter Vitamin D3 at 2000 units once a day.  We will follow the levels periodically.  Your PSA (prostate) test is within the normal range.  Your Cholesterol panel was overall good.  Your Urine Test was normal.  Your STD screen is still in process for HIV 1/2, Syphilis, and Gonorrhea/Chlamydia.    Please let me know if you have any questions or concerns.

## 2019-05-31 NOTE — TELEPHONE ENCOUNTER
----- Message from Neyda Timmons sent at 5/31/2019  9:24 AM CDT -----  Contact: Self  Type:  Patient Returning Call    Who Called: MOLLY MISTRY [[phone]]    Who Left Message for Patient: Adarsh    Does the patient know what this is regarding?: Yes, just left a message    Best Call Back Number: [phone]    Additional Information: None

## 2019-05-31 NOTE — TELEPHONE ENCOUNTER
----- Message from Jairon Boogie MD sent at 5/30/2019  2:22 PM CDT -----  Please let patient know the following:    Your STD screen was negative for HIV 1/2, Syphilis, and Gonorrhea/Chlamydia.

## 2019-05-31 NOTE — TELEPHONE ENCOUNTER
Pt verbalized understanding that his Metabolic Panel is normal.  Your Blood Counts show a stable very mild anemia, but your white blood cell count and platelets are normal.  You had a mild anemia 11 years ago, so this is unchanged.  No further work-up needed at this time.  Your Thyroid Function is pending - I will notify you if this test returns as abnormal.  Your vitamin D level was low.  I recommend taking over the counter Vitamin D3 at 2000 units once a day.  We will follow the levels periodically.  Your PSA (prostate) test is within the normal range.  Your Cholesterol panel was overall good.  Your Urine Test was normal.  Your STD screen is negative for HIV 1/2, Syphilis, and Gonorrhea/Chlamydia.     Please let me know if you have any questions or concerns.   5*31*19 jdp

## 2019-11-13 PROBLEM — I10 ESSENTIAL HYPERTENSION: Status: ACTIVE | Noted: 2019-10-31

## 2019-11-13 PROBLEM — H20.9 TRAUMATIC IRITIS: Status: ACTIVE | Noted: 2019-08-20

## 2019-11-13 PROBLEM — H40.039 ANATOMICAL NARROW ANGLE GLAUCOMA: Status: ACTIVE | Noted: 2018-05-11

## 2020-05-13 ENCOUNTER — NURSE TRIAGE (OUTPATIENT)
Dept: ADMINISTRATIVE | Facility: CLINIC | Age: 55
End: 2020-05-13

## 2020-05-13 NOTE — TELEPHONE ENCOUNTER
Pt c/o intermittent lower back pain x 1 year. Pt states he fell off ladder in Aug 2018 and was evaluated for fall in ED. Pt denies new injury. Pt advised per protocol and pt verbalizes understanding.     Reason for Disposition   MODERATE back pain (e.g., interferes with normal activities) and present > 3 days    Additional Information   Negative: Passed out (i.e., fainted, collapsed and was not responding)   Negative: Shock suspected (e.g., cold/pale/clammy skin, too weak to stand, low BP, rapid pulse)   Negative: Sounds like a life-threatening emergency to the triager   Negative: SEVERE back pain of sudden onset and age > 60   Negative: SEVERE abdominal pain (e.g., excruciating)   Negative: Abdominal pain and age > 60   Negative: Unable to urinate (or only a few drops) and bladder feels very full   Negative: Loss of bladder or bowel control (urine or bowel incontinence; wetting self, leaking stool) of new onset   Negative: Numbness (loss of sensation) in groin or rectal area   Negative: Pain radiates into groin, scrotum   Negative: Blood in urine (red, pink, or tea-colored)   Negative: Vomiting and pain over lower ribs of back (i.e., flank - kidney area)   Negative: Weakness of a leg or foot (e.g., unable to bear weight, dragging foot)   Negative: Patient sounds very sick or weak to the triager   Negative: Fever > 100.5 F (38.1 C) and flank pain   Negative: Pain or burning with passing urine (urination)   Negative: SEVERE back pain (e.g., excruciating, unable to do any normal activities) and not improved after pain medicine and CARE ADVICE   Negative: Numbness in an arm or hand (i.e., loss of sensation) and upper back pain   Negative: Numbness in a leg or foot (i.e., loss of sensation)   Negative: High-risk adult (e.g., history of cancer, history of HIV, or history of IV drug abuse)   Negative: Painful rash with multiple small blisters grouped together (i.e., dermatomal distribution or 'band'  or 'stripe')   Negative: Pain radiates into the thigh or further down the leg, and in both legs   Negative: Age > 50 and no history of prior similar back pain    Protocols used: BACK PAIN-A-[place]

## 2020-05-14 NOTE — TELEPHONE ENCOUNTER
Patient stated he's not interested in establishing care with a new PCP at this time, he just want to be seen in office for the pain he's having in his back. Is it ok to schedule patient to be seen in office? Please advise

## 2020-05-26 ENCOUNTER — LAB VISIT (OUTPATIENT)
Dept: PRIMARY CARE CLINIC | Facility: CLINIC | Age: 55
End: 2020-05-26
Payer: COMMERCIAL

## 2020-05-26 DIAGNOSIS — R05.9 COUGH: Primary | ICD-10-CM

## 2020-05-26 PROCEDURE — U0003 INFECTIOUS AGENT DETECTION BY NUCLEIC ACID (DNA OR RNA); SEVERE ACUTE RESPIRATORY SYNDROME CORONAVIRUS 2 (SARS-COV-2) (CORONAVIRUS DISEASE [COVID-19]), AMPLIFIED PROBE TECHNIQUE, MAKING USE OF HIGH THROUGHPUT TECHNOLOGIES AS DESCRIBED BY CMS-2020-01-R: HCPCS

## 2020-05-27 LAB — SARS-COV-2 RNA RESP QL NAA+PROBE: NOT DETECTED

## 2020-07-01 ENCOUNTER — LAB VISIT (OUTPATIENT)
Dept: LAB | Facility: HOSPITAL | Age: 55
End: 2020-07-01
Attending: INTERNAL MEDICINE
Payer: COMMERCIAL

## 2020-07-01 ENCOUNTER — OFFICE VISIT (OUTPATIENT)
Dept: INTERNAL MEDICINE | Facility: CLINIC | Age: 55
End: 2020-07-01
Payer: COMMERCIAL

## 2020-07-01 ENCOUNTER — HOSPITAL ENCOUNTER (OUTPATIENT)
Dept: RADIOLOGY | Facility: HOSPITAL | Age: 55
Discharge: HOME | End: 2020-07-01
Attending: INTERNAL MEDICINE
Payer: COMMERCIAL

## 2020-07-01 VITALS
DIASTOLIC BLOOD PRESSURE: 86 MMHG | WEIGHT: 178.56 LBS | HEART RATE: 86 BPM | HEIGHT: 74 IN | SYSTOLIC BLOOD PRESSURE: 140 MMHG | BODY MASS INDEX: 22.91 KG/M2 | OXYGEN SATURATION: 99 %

## 2020-07-01 DIAGNOSIS — N50.82 SCROTAL PAIN: ICD-10-CM

## 2020-07-01 DIAGNOSIS — M54.50 LUMBAR PAIN: ICD-10-CM

## 2020-07-01 DIAGNOSIS — M54.9 DORSALGIA, UNSPECIFIED: ICD-10-CM

## 2020-07-01 DIAGNOSIS — M54.50 LUMBAR PAIN: Primary | ICD-10-CM

## 2020-07-01 DIAGNOSIS — I10 ESSENTIAL HYPERTENSION: ICD-10-CM

## 2020-07-01 LAB
ANION GAP SERPL CALC-SCNC: 7 MMOL/L (ref 8–16)
BACTERIA #/AREA URNS AUTO: NORMAL /HPF
BASOPHILS # BLD AUTO: 0.02 K/UL (ref 0–0.2)
BASOPHILS NFR BLD: 0.5 % (ref 0–1.9)
BILIRUB UR QL STRIP: NEGATIVE
BUN SERPL-MCNC: 21 MG/DL (ref 6–20)
CALCIUM SERPL-MCNC: 9.4 MG/DL (ref 8.7–10.5)
CHLORIDE SERPL-SCNC: 106 MMOL/L (ref 95–110)
CLARITY UR REFRACT.AUTO: CLEAR
CO2 SERPL-SCNC: 27 MMOL/L (ref 23–29)
COLOR UR AUTO: YELLOW
COMPLEXED PSA SERPL-MCNC: 1.6 NG/ML (ref 0–4)
CREAT SERPL-MCNC: 1.2 MG/DL (ref 0.5–1.4)
DIFFERENTIAL METHOD: ABNORMAL
EOSINOPHIL # BLD AUTO: 0.2 K/UL (ref 0–0.5)
EOSINOPHIL NFR BLD: 6.3 % (ref 0–8)
ERYTHROCYTE [DISTWIDTH] IN BLOOD BY AUTOMATED COUNT: 11.9 % (ref 11.5–14.5)
EST. GFR  (AFRICAN AMERICAN): >60 ML/MIN/1.73 M^2
EST. GFR  (NON AFRICAN AMERICAN): >60 ML/MIN/1.73 M^2
GLUCOSE SERPL-MCNC: 93 MG/DL (ref 70–110)
GLUCOSE UR QL STRIP: NEGATIVE
HCT VFR BLD AUTO: 43.5 % (ref 40–54)
HGB BLD-MCNC: 14.1 G/DL (ref 14–18)
HGB UR QL STRIP: NEGATIVE
IMM GRANULOCYTES # BLD AUTO: 0.01 K/UL (ref 0–0.04)
IMM GRANULOCYTES NFR BLD AUTO: 0.3 % (ref 0–0.5)
KETONES UR QL STRIP: NEGATIVE
LEUKOCYTE ESTERASE UR QL STRIP: NEGATIVE
LYMPHOCYTES # BLD AUTO: 1.4 K/UL (ref 1–4.8)
LYMPHOCYTES NFR BLD: 36.6 % (ref 18–48)
MCH RBC QN AUTO: 29.7 PG (ref 27–31)
MCHC RBC AUTO-ENTMCNC: 32.4 G/DL (ref 32–36)
MCV RBC AUTO: 92 FL (ref 82–98)
MICROSCOPIC COMMENT: NORMAL
MONOCYTES # BLD AUTO: 0.4 K/UL (ref 0.3–1)
MONOCYTES NFR BLD: 11.5 % (ref 4–15)
NEUTROPHILS # BLD AUTO: 1.7 K/UL (ref 1.8–7.7)
NEUTROPHILS NFR BLD: 44.8 % (ref 38–73)
NITRITE UR QL STRIP: NEGATIVE
NRBC BLD-RTO: 0 /100 WBC
PH UR STRIP: 5 [PH] (ref 5–8)
PLATELET # BLD AUTO: 228 K/UL (ref 150–350)
PMV BLD AUTO: 10.1 FL (ref 9.2–12.9)
POTASSIUM SERPL-SCNC: 4.5 MMOL/L (ref 3.5–5.1)
PROT UR QL STRIP: NEGATIVE
RBC # BLD AUTO: 4.74 M/UL (ref 4.6–6.2)
RBC #/AREA URNS AUTO: 0 /HPF (ref 0–4)
SODIUM SERPL-SCNC: 140 MMOL/L (ref 136–145)
SP GR UR STRIP: 1.03 (ref 1–1.03)
URN SPEC COLLECT METH UR: NORMAL
WBC # BLD AUTO: 3.83 K/UL (ref 3.9–12.7)
WBC #/AREA URNS AUTO: 1 /HPF (ref 0–5)

## 2020-07-01 PROCEDURE — 73521 XR HIPS BILATERAL 2 VIEW INCL AP PELVIS: ICD-10-PCS | Mod: 26,,, | Performed by: RADIOLOGY

## 2020-07-01 PROCEDURE — 73521 X-RAY EXAM HIPS BI 2 VIEWS: CPT | Mod: 26,,, | Performed by: RADIOLOGY

## 2020-07-01 PROCEDURE — 99214 PR OFFICE/OUTPT VISIT, EST, LEVL IV, 30-39 MIN: ICD-10-PCS | Mod: S$GLB,,, | Performed by: INTERNAL MEDICINE

## 2020-07-01 PROCEDURE — 73521 X-RAY EXAM HIPS BI 2 VIEWS: CPT | Mod: TC

## 2020-07-01 PROCEDURE — 72100 X-RAY EXAM L-S SPINE 2/3 VWS: CPT | Mod: 26,,, | Performed by: RADIOLOGY

## 2020-07-01 PROCEDURE — 84153 ASSAY OF PSA TOTAL: CPT

## 2020-07-01 PROCEDURE — 80048 BASIC METABOLIC PNL TOTAL CA: CPT

## 2020-07-01 PROCEDURE — 99214 OFFICE O/P EST MOD 30 MIN: CPT | Mod: S$GLB,,, | Performed by: INTERNAL MEDICINE

## 2020-07-01 PROCEDURE — 85025 COMPLETE CBC W/AUTO DIFF WBC: CPT

## 2020-07-01 PROCEDURE — 3079F PR MOST RECENT DIASTOLIC BLOOD PRESSURE 80-89 MM HG: ICD-10-PCS | Mod: CPTII,S$GLB,, | Performed by: INTERNAL MEDICINE

## 2020-07-01 PROCEDURE — 72100 XR LUMBAR SPINE AP AND LATERAL: ICD-10-PCS | Mod: 26,,, | Performed by: RADIOLOGY

## 2020-07-01 PROCEDURE — 3077F SYST BP >= 140 MM HG: CPT | Mod: CPTII,S$GLB,, | Performed by: INTERNAL MEDICINE

## 2020-07-01 PROCEDURE — 81001 URINALYSIS AUTO W/SCOPE: CPT

## 2020-07-01 PROCEDURE — 99999 PR PBB SHADOW E&M-EST. PATIENT-LVL IV: ICD-10-PCS | Mod: PBBFAC,,, | Performed by: INTERNAL MEDICINE

## 2020-07-01 PROCEDURE — 3008F BODY MASS INDEX DOCD: CPT | Mod: CPTII,S$GLB,, | Performed by: INTERNAL MEDICINE

## 2020-07-01 PROCEDURE — 3077F PR MOST RECENT SYSTOLIC BLOOD PRESSURE >= 140 MM HG: ICD-10-PCS | Mod: CPTII,S$GLB,, | Performed by: INTERNAL MEDICINE

## 2020-07-01 PROCEDURE — 72100 X-RAY EXAM L-S SPINE 2/3 VWS: CPT | Mod: TC

## 2020-07-01 PROCEDURE — 3008F PR BODY MASS INDEX (BMI) DOCUMENTED: ICD-10-PCS | Mod: CPTII,S$GLB,, | Performed by: INTERNAL MEDICINE

## 2020-07-01 PROCEDURE — 36415 COLL VENOUS BLD VENIPUNCTURE: CPT

## 2020-07-01 PROCEDURE — 99999 PR PBB SHADOW E&M-EST. PATIENT-LVL IV: CPT | Mod: PBBFAC,,, | Performed by: INTERNAL MEDICINE

## 2020-07-01 PROCEDURE — 3079F DIAST BP 80-89 MM HG: CPT | Mod: CPTII,S$GLB,, | Performed by: INTERNAL MEDICINE

## 2020-07-01 RX ORDER — BRIMONIDINE TARTRATE 2 MG/ML
1 SOLUTION/ DROPS OPHTHALMIC
COMMUNITY
Start: 2020-06-19 | End: 2021-03-19 | Stop reason: SDUPTHER

## 2020-07-01 RX ORDER — DORZOLAMIDE HYDROCHLORIDE AND TIMOLOL MALEATE 20; 5 MG/ML; MG/ML
1 SOLUTION/ DROPS OPHTHALMIC
COMMUNITY
Start: 2020-06-19 | End: 2021-03-19 | Stop reason: SDUPTHER

## 2020-07-01 RX ORDER — BRIMONIDINE TARTRATE 2 MG/ML
SOLUTION/ DROPS OPHTHALMIC
COMMUNITY
Start: 2020-05-11

## 2020-07-01 RX ORDER — METHOCARBAMOL 500 MG/1
500 TABLET, FILM COATED ORAL 3 TIMES DAILY
Qty: 30 TABLET | Refills: 0 | Status: SHIPPED | OUTPATIENT
Start: 2020-07-01 | End: 2020-07-11

## 2020-07-01 RX ORDER — DORZOLAMIDE HYDROCHLORIDE AND TIMOLOL MALEATE 20; 5 MG/ML; MG/ML
SOLUTION/ DROPS OPHTHALMIC
COMMUNITY
Start: 2020-06-08

## 2020-07-01 NOTE — PROGRESS NOTES
55-year-old male    Reason for the visit is been having lumbar pain for 1 year to 2 years has he describes  Initially remembers having pain in his left lumbar region, but presently it is the the right lumbar region that he can feel most the time, worse when lying down at night or when sitting himself 5, and there are times where my extending around his abdomen to his groin    In review of his chart he has a history of chronic lumbar pain and 2015 was involved in a healthy back program which he thinks did not help  He is not taking any prescribed or over-the-counter medication    There is no associated abdominal pain and bowel function urination is fine    Past medical history glaucoma    Medication list per med card which is eye drops    Social history  Tobacco use none alcohol use limited    Examination  Weight 178 lb  Pulse 80  Blood pressure 140/86 by my assistant parentheses 160 over98 by me  Lungs clear breath sounds  Heart regular rate and rhythm  Abdominal exam nontender soft no masses  2+ knee and ankle jerk reflexes  May feel some degree of back pain on the right when abduct leg at the joint  Genitalia exam no scrotal masses although the right epididymis may be a bit more prominent no evidence for hernias    Actually when he referred to groin pain he was referred to off and on pain involving his right scrotum    Impression  Chronic lumbar pain  Episodic right scrotal pain  Elevated blood pressure    Plan  CBC, basic metabolic profile, PSA, urinalysis, scrotal ultrasound, radiographs of lumbar spine and pelvis  For 10 days methocarbamol 500 mg 3 times a

## 2020-07-09 ENCOUNTER — HOSPITAL ENCOUNTER (OUTPATIENT)
Dept: RADIOLOGY | Facility: HOSPITAL | Age: 55
Discharge: HOME | End: 2020-07-09
Attending: INTERNAL MEDICINE
Payer: COMMERCIAL

## 2020-07-09 DIAGNOSIS — M54.50 LUMBAR PAIN: ICD-10-CM

## 2020-07-09 DIAGNOSIS — N50.82 SCROTAL PAIN: ICD-10-CM

## 2020-07-09 PROCEDURE — 76870 US EXAM SCROTUM: CPT | Mod: 26,,, | Performed by: RADIOLOGY

## 2020-07-09 PROCEDURE — 76870 US SCROTUM AND TESTICLES: ICD-10-PCS | Mod: 26,,, | Performed by: RADIOLOGY

## 2020-07-09 PROCEDURE — 76870 US EXAM SCROTUM: CPT | Mod: TC

## 2020-07-11 NOTE — PROGRESS NOTES
Lab testing looked fine, there was a slight leukopenia but this is chronic    Radiographs of the lumbar spine and hip did reveal mild arthritis  Ultrasound reveal right varicocele      He is doing well with the use of methocarbamol is needed, discuss about being involved with physical therapy but he defers, he artery went through the healthy back program, stretching exercises legs discussed, if he has any chronic ongoing right scrotal pain will consider referral to urology    Will make return visit follow-up on blood

## 2020-09-29 ENCOUNTER — OFFICE VISIT (OUTPATIENT)
Dept: INTERNAL MEDICINE | Facility: CLINIC | Age: 55
End: 2020-09-29
Payer: COMMERCIAL

## 2020-09-29 ENCOUNTER — HOSPITAL ENCOUNTER (OUTPATIENT)
Dept: RADIOLOGY | Facility: OTHER | Age: 55
Discharge: HOME | End: 2020-09-29
Attending: INTERNAL MEDICINE
Payer: COMMERCIAL

## 2020-09-29 ENCOUNTER — NURSE TRIAGE (OUTPATIENT)
Dept: ADMINISTRATIVE | Facility: CLINIC | Age: 55
End: 2020-09-29

## 2020-09-29 VITALS
HEIGHT: 74 IN | WEIGHT: 186.75 LBS | BODY MASS INDEX: 23.97 KG/M2 | SYSTOLIC BLOOD PRESSURE: 158 MMHG | HEART RATE: 78 BPM | OXYGEN SATURATION: 98 % | DIASTOLIC BLOOD PRESSURE: 98 MMHG | TEMPERATURE: 98 F

## 2020-09-29 DIAGNOSIS — M54.9 DORSALGIA, UNSPECIFIED: ICD-10-CM

## 2020-09-29 PROCEDURE — 3008F BODY MASS INDEX DOCD: CPT | Mod: CPTII,S$GLB,, | Performed by: INTERNAL MEDICINE

## 2020-09-29 PROCEDURE — 99214 PR OFFICE/OUTPT VISIT, EST, LEVL IV, 30-39 MIN: ICD-10-PCS | Mod: 25,S$GLB,, | Performed by: INTERNAL MEDICINE

## 2020-09-29 PROCEDURE — 3080F PR MOST RECENT DIASTOLIC BLOOD PRESSURE >= 90 MM HG: ICD-10-PCS | Mod: CPTII,S$GLB,, | Performed by: INTERNAL MEDICINE

## 2020-09-29 PROCEDURE — 3077F SYST BP >= 140 MM HG: CPT | Mod: CPTII,S$GLB,, | Performed by: INTERNAL MEDICINE

## 2020-09-29 PROCEDURE — 96372 THER/PROPH/DIAG INJ SC/IM: CPT | Mod: S$GLB,,, | Performed by: INTERNAL MEDICINE

## 2020-09-29 PROCEDURE — 99214 OFFICE O/P EST MOD 30 MIN: CPT | Mod: 25,S$GLB,, | Performed by: INTERNAL MEDICINE

## 2020-09-29 PROCEDURE — 96372 PR INJECTION,THERAP/PROPH/DIAG2ST, IM OR SUBCUT: ICD-10-PCS | Mod: S$GLB,,, | Performed by: INTERNAL MEDICINE

## 2020-09-29 PROCEDURE — 3077F PR MOST RECENT SYSTOLIC BLOOD PRESSURE >= 140 MM HG: ICD-10-PCS | Mod: CPTII,S$GLB,, | Performed by: INTERNAL MEDICINE

## 2020-09-29 PROCEDURE — 99999 PR PBB SHADOW E&M-EST. PATIENT-LVL III: CPT | Mod: PBBFAC,,, | Performed by: INTERNAL MEDICINE

## 2020-09-29 PROCEDURE — 3008F PR BODY MASS INDEX (BMI) DOCUMENTED: ICD-10-PCS | Mod: CPTII,S$GLB,, | Performed by: INTERNAL MEDICINE

## 2020-09-29 PROCEDURE — 3080F DIAST BP >= 90 MM HG: CPT | Mod: CPTII,S$GLB,, | Performed by: INTERNAL MEDICINE

## 2020-09-29 PROCEDURE — 99999 PR PBB SHADOW E&M-EST. PATIENT-LVL III: ICD-10-PCS | Mod: PBBFAC,,, | Performed by: INTERNAL MEDICINE

## 2020-09-29 RX ORDER — KETOROLAC TROMETHAMINE 30 MG/ML
60 INJECTION, SOLUTION INTRAMUSCULAR; INTRAVENOUS
Status: COMPLETED | OUTPATIENT
Start: 2020-09-29 | End: 2020-09-29

## 2020-09-29 RX ORDER — AMLODIPINE BESYLATE 5 MG/1
5 TABLET ORAL DAILY
Qty: 30 TABLET | Refills: 5 | Status: SHIPPED | OUTPATIENT
Start: 2020-09-29 | End: 2020-09-29

## 2020-09-29 RX ORDER — METHOCARBAMOL 500 MG/1
500 TABLET, FILM COATED ORAL 3 TIMES DAILY
Qty: 30 TABLET | Refills: 0 | Status: SHIPPED | OUTPATIENT
Start: 2020-09-29 | End: 2020-10-09

## 2020-09-29 RX ORDER — DICLOFENAC SODIUM 75 MG/1
75 TABLET, DELAYED RELEASE ORAL 2 TIMES DAILY
Qty: 20 TABLET | Refills: 0 | Status: SHIPPED | OUTPATIENT
Start: 2020-09-29 | End: 2020-10-09

## 2020-09-29 RX ADMIN — KETOROLAC TROMETHAMINE 60 MG: 30 INJECTION, SOLUTION INTRAMUSCULAR; INTRAVENOUS at 12:09

## 2020-09-29 NOTE — TELEPHONE ENCOUNTER
Reason for Disposition   Back pain from overuse (work, exercise, gardening) OR from twisting, lifting, or bending injury   [1] Age > 50 AND [2] no history of prior similar back pain    Additional Information   Negative: Dangerous mechanism of injury (e.g., MVA, contact sports, trampoline, diving, fall > 10 feet or 3 meters)  (Exception: back pain began > 1 hour after injury)   Negative: [1] Weakness (i.e., paralysis, loss of muscle strength) of the leg(s) or foot AND [2] sudden onset after back injury   Negative: [1] Numbness (i.e., loss of sensation) of the leg(s) or foot AND [2] sudden onset after back injury   Negative: [1] Major bleeding (e.g., actively dripping or spurting) AND [2] can't be stopped   Negative: Bullet wound, knife wound, or other penetrating object   Negative: Shock suspected (e.g., cold/pale/clammy skin, too weak to stand, low BP, rapid pulse)   Negative: Sounds like a life-threatening emergency to the triager   Negative: Passed out (i.e., lost consciousness, collapsed and was not responding)   Negative: Shock suspected (e.g., cold/pale/clammy skin, too weak to stand, low BP, rapid pulse)   Negative: Sounds like a life-threatening emergency to the triager   Negative: Major injury to the back (e.g., MVA, fall > 10 feet or 3 meters, penetrating injury, etc.)   Negative: Followed a tailbone injury   Negative: [1] Pain in the upper back over the ribs (rib cage) AND [2] radiates (travels, goes) into chest   Negative: [1] Pain in the upper back over the ribs (rib cage) AND [2] worsened by coughing (or clearly increases with breathing)   Negative: Back pain during pregnancy   Negative: Pain mainly in flank (i.e., in the side, over the lower ribs or just below the ribs)   Negative: [1] SEVERE back pain (e.g., excruciating) AND [2] sudden onset AND [3] age > 60   Negative: [1] Unable to urinate (or only a few drops) > 4 hours AND [2] bladder feels very full (e.g., palpable bladder  "or strong urge to urinate)   Negative: [1] Loss of bladder or bowel control (urine or bowel incontinence; wetting self, leaking stool) AND [2] new onset   Negative: Numbness in groin or rectal area (i.e., loss of sensation)   Negative: [1] SEVERE abdominal pain AND [2] present > 1 hour   Negative: [1] Abdominal pain AND [2] age > 60   Negative: Weakness of a leg or foot (e.g., unable to bear weight, dragging foot)   Negative: Unable to walk   Negative: Patient sounds very sick or weak to the triager   Negative: [1] SEVERE back pain (e.g., excruciating, unable to do any normal activities) AND [2] not improved 2 hours after pain medicine   Negative: [1] Pain radiates into the thigh or further down the leg AND [2] both legs   Negative: [1] Fever > 100.0 F (37.8 C) AND [2] flank pain (i.e., in side, below ribs and above hip)   Negative: [1] Pain or burning with passing urine (urination) AND [2] flank pain (i.e., in side, below ribs and above hip)   Negative: Numbness in a leg or foot (i.e., loss of sensation)   Negative: [1] Numbness in an arm or hand (i.e., loss of sensation) AND [2] upper back pain   Negative: High-risk adult (e.g., history of cancer, HIV, or IV drug abuse)   Negative: [1] Fever AND [2] no symptoms of UTI  (Exception: has generalized muscle pains, not localized back pain)   Negative: Rash in same area as pain (may be described as "small blisters")   Negative: Blood in urine (red, pink, or tea-colored)    Protocols used: BACK INJURY-A-AH, BACK PAIN-A-AH    "

## 2020-09-29 NOTE — PROGRESS NOTES
55-year-old male    For 1 week he has been having left lower back pain has been persistent, started noticing after he was not attempting to move a sofa.  The pain is localized, but today he has noticed generalized weakness involving the left leg and a numbing feeling in front of the leg.    He has been seen before for low back pain and previous radiographs did reveal anterolisthesis and degenerative changes L4-5    Last night ear left over cyclobenzaprine I took which may have helped slightly    Examination  Weight 186  Pulse 80  Blood pressure 148/82  2+ right knee reflex  Absent left knee reflex  2+ bilateral ankle jerk reflex  Was straight leg raise has left low back pain  Motor strength is normal    Impression  Acute lumbar pain probably due to lumbar strain  Lumbar spondylosis with chronic lumbar radiculopathy   Hypertension    Plan  MRI of the lumbar spine  Toradol 60 mg IM  Refill of methocarbamol 500 mg 3 times a day for 1 week  Diclofenac 75 mg b.i.d.  For 1 week  Also.  Also start amlodipine 5 mg a day for hypertension

## 2020-09-30 ENCOUNTER — HOSPITAL ENCOUNTER (OUTPATIENT)
Dept: RADIOLOGY | Facility: OTHER | Age: 55
Discharge: HOME | End: 2020-09-30
Attending: INTERNAL MEDICINE
Payer: COMMERCIAL

## 2020-09-30 PROCEDURE — 72148 MRI LUMBAR SPINE W/O DYE: CPT | Mod: TC

## 2020-09-30 PROCEDURE — 72148 MRI LUMBAR SPINE WITHOUT CONTRAST: ICD-10-PCS | Mod: 26,,, | Performed by: RADIOLOGY

## 2020-09-30 PROCEDURE — 72148 MRI LUMBAR SPINE W/O DYE: CPT | Mod: 26,,, | Performed by: RADIOLOGY

## 2020-10-01 ENCOUNTER — PATIENT MESSAGE (OUTPATIENT)
Dept: INTERNAL MEDICINE | Facility: CLINIC | Age: 55
End: 2020-10-01

## 2020-10-01 RX ORDER — HYDROCODONE BITARTRATE AND ACETAMINOPHEN 5; 325 MG/1; MG/1
1 TABLET ORAL EVERY 4 HOURS PRN
Qty: 20 TABLET | Refills: 0 | Status: SHIPPED | OUTPATIENT
Start: 2020-10-01 | End: 2020-10-07 | Stop reason: SDUPTHER

## 2020-10-01 RX ORDER — PREDNISONE 20 MG/1
TABLET ORAL
Qty: 12 TABLET | Refills: 0 | Status: SHIPPED | OUTPATIENT
Start: 2020-10-01 | End: 2020-10-07 | Stop reason: SDUPTHER

## 2020-10-01 NOTE — PROGRESS NOTES
MRI reviewed  Mild spinal stenosis and neural foraminal stenosis noted at L4-5 and mild neural foraminal stenosis with disc bulge on the right at L5-S1    However his main pain now it is a burning pain down anterior aspect of the left leg      Will give a course of prednisone over 8 days along with the methocarbamol and the diclofenac      If there is no resolution, consider EMG study

## 2020-10-06 RX ORDER — AMLODIPINE BESYLATE 5 MG/1
TABLET ORAL
COMMUNITY
Start: 2020-09-29 | End: 2021-03-19 | Stop reason: SDUPTHER

## 2020-10-07 ENCOUNTER — TELEPHONE (OUTPATIENT)
Dept: INTERNAL MEDICINE | Facility: CLINIC | Age: 55
End: 2020-10-07

## 2020-10-07 NOTE — TELEPHONE ENCOUNTER
Telephoned patient, left message to return call----- Message from Tamera Fabian sent at 10/7/2020  7:18 AM CDT -----  Contact: self/ [phone]  Patient is returning a phone call.  Who left a message for the patient: Janis Erazo MA    Does patient know what this is regarding:    Comments: This is the patient 3 rd phone call and no response from the doctor. Patient states that he is still in a lot of pain and he can hardly walk. Please call and advise.

## 2020-10-08 DIAGNOSIS — M47.27 LUMBOSACRAL SPONDYLOSIS WITH RADICULOPATHY: Primary | ICD-10-CM

## 2020-10-14 ENCOUNTER — OFFICE VISIT (OUTPATIENT)
Dept: INTERNAL MEDICINE | Facility: CLINIC | Age: 55
End: 2020-10-14
Payer: COMMERCIAL

## 2020-10-14 ENCOUNTER — LAB VISIT (OUTPATIENT)
Dept: LAB | Facility: HOSPITAL | Age: 55
End: 2020-10-14
Attending: INTERNAL MEDICINE
Payer: COMMERCIAL

## 2020-10-14 VITALS
HEART RATE: 90 BPM | SYSTOLIC BLOOD PRESSURE: 140 MMHG | DIASTOLIC BLOOD PRESSURE: 78 MMHG | OXYGEN SATURATION: 98 % | WEIGHT: 185 LBS | HEIGHT: 74 IN | BODY MASS INDEX: 23.74 KG/M2

## 2020-10-14 DIAGNOSIS — R10.2 PERINEAL PAIN IN MALE: ICD-10-CM

## 2020-10-14 DIAGNOSIS — I10 ESSENTIAL HYPERTENSION: ICD-10-CM

## 2020-10-14 DIAGNOSIS — M25.552 PAIN IN JOINT INVOLVING PELVIC REGION AND THIGH, LEFT: Primary | ICD-10-CM

## 2020-10-14 DIAGNOSIS — R20.0 LEFT LEG NUMBNESS: ICD-10-CM

## 2020-10-14 DIAGNOSIS — R29.898 LEFT LEG WEAKNESS: ICD-10-CM

## 2020-10-14 DIAGNOSIS — M25.552 PAIN IN JOINT INVOLVING PELVIC REGION AND THIGH, LEFT: ICD-10-CM

## 2020-10-14 DIAGNOSIS — Z20.2 ENCOUNTER FOR ASSESSMENT OF STD EXPOSURE: ICD-10-CM

## 2020-10-14 LAB
ANION GAP SERPL CALC-SCNC: 9 MMOL/L (ref 8–16)
BASOPHILS # BLD AUTO: 0.02 K/UL (ref 0–0.2)
BASOPHILS NFR BLD: 0.2 % (ref 0–1.9)
BILIRUB UR QL STRIP: NEGATIVE
BUN SERPL-MCNC: 18 MG/DL (ref 6–20)
CALCIUM SERPL-MCNC: 9.5 MG/DL (ref 8.7–10.5)
CHLORIDE SERPL-SCNC: 102 MMOL/L (ref 95–110)
CLARITY UR REFRACT.AUTO: CLEAR
CO2 SERPL-SCNC: 29 MMOL/L (ref 23–29)
COLOR UR AUTO: YELLOW
CREAT SERPL-MCNC: 1.1 MG/DL (ref 0.5–1.4)
CRP SERPL-MCNC: 0.7 MG/L (ref 0–8.2)
DIFFERENTIAL METHOD: ABNORMAL
EOSINOPHIL # BLD AUTO: 0 K/UL (ref 0–0.5)
EOSINOPHIL NFR BLD: 0.2 % (ref 0–8)
ERYTHROCYTE [DISTWIDTH] IN BLOOD BY AUTOMATED COUNT: 11.9 % (ref 11.5–14.5)
EST. GFR  (AFRICAN AMERICAN): >60 ML/MIN/1.73 M^2
EST. GFR  (NON AFRICAN AMERICAN): >60 ML/MIN/1.73 M^2
GLUCOSE SERPL-MCNC: 111 MG/DL (ref 70–110)
GLUCOSE UR QL STRIP: NEGATIVE
HCT VFR BLD AUTO: 46.2 % (ref 40–54)
HGB BLD-MCNC: 15.2 G/DL (ref 14–18)
HGB UR QL STRIP: NEGATIVE
IMM GRANULOCYTES # BLD AUTO: 0.05 K/UL (ref 0–0.04)
IMM GRANULOCYTES NFR BLD AUTO: 0.6 % (ref 0–0.5)
KETONES UR QL STRIP: NEGATIVE
LEUKOCYTE ESTERASE UR QL STRIP: NEGATIVE
LYMPHOCYTES # BLD AUTO: 0.8 K/UL (ref 1–4.8)
LYMPHOCYTES NFR BLD: 9.7 % (ref 18–48)
MCH RBC QN AUTO: 30.3 PG (ref 27–31)
MCHC RBC AUTO-ENTMCNC: 32.9 G/DL (ref 32–36)
MCV RBC AUTO: 92 FL (ref 82–98)
MICROSCOPIC COMMENT: NORMAL
MONOCYTES # BLD AUTO: 0.2 K/UL (ref 0.3–1)
MONOCYTES NFR BLD: 2.5 % (ref 4–15)
NEUTROPHILS # BLD AUTO: 7.4 K/UL (ref 1.8–7.7)
NEUTROPHILS NFR BLD: 86.8 % (ref 38–73)
NITRITE UR QL STRIP: NEGATIVE
NRBC BLD-RTO: 0 /100 WBC
PH UR STRIP: 5 [PH] (ref 5–8)
PLATELET # BLD AUTO: 236 K/UL (ref 150–350)
PMV BLD AUTO: 10.1 FL (ref 9.2–12.9)
POTASSIUM SERPL-SCNC: 4.3 MMOL/L (ref 3.5–5.1)
PROT UR QL STRIP: NEGATIVE
RBC # BLD AUTO: 5.02 M/UL (ref 4.6–6.2)
RBC #/AREA URNS AUTO: 0 /HPF (ref 0–4)
SODIUM SERPL-SCNC: 140 MMOL/L (ref 136–145)
SP GR UR STRIP: >=1.03 (ref 1–1.03)
URN SPEC COLLECT METH UR: ABNORMAL
WBC # BLD AUTO: 8.56 K/UL (ref 3.9–12.7)
WBC #/AREA URNS AUTO: 1 /HPF (ref 0–5)

## 2020-10-14 PROCEDURE — 99999 PR PBB SHADOW E&M-EST. PATIENT-LVL III: ICD-10-PCS | Mod: PBBFAC,,, | Performed by: INTERNAL MEDICINE

## 2020-10-14 PROCEDURE — 3008F PR BODY MASS INDEX (BMI) DOCUMENTED: ICD-10-PCS | Mod: CPTII,S$GLB,, | Performed by: INTERNAL MEDICINE

## 2020-10-14 PROCEDURE — 3077F PR MOST RECENT SYSTOLIC BLOOD PRESSURE >= 140 MM HG: ICD-10-PCS | Mod: CPTII,S$GLB,, | Performed by: INTERNAL MEDICINE

## 2020-10-14 PROCEDURE — 99214 PR OFFICE/OUTPT VISIT, EST, LEVL IV, 30-39 MIN: ICD-10-PCS | Mod: S$GLB,,, | Performed by: INTERNAL MEDICINE

## 2020-10-14 PROCEDURE — 99999 PR PBB SHADOW E&M-EST. PATIENT-LVL III: CPT | Mod: PBBFAC,,, | Performed by: INTERNAL MEDICINE

## 2020-10-14 PROCEDURE — 81001 URINALYSIS AUTO W/SCOPE: CPT

## 2020-10-14 PROCEDURE — 85025 COMPLETE CBC W/AUTO DIFF WBC: CPT

## 2020-10-14 PROCEDURE — 80048 BASIC METABOLIC PNL TOTAL CA: CPT

## 2020-10-14 PROCEDURE — 3008F BODY MASS INDEX DOCD: CPT | Mod: CPTII,S$GLB,, | Performed by: INTERNAL MEDICINE

## 2020-10-14 PROCEDURE — 86140 C-REACTIVE PROTEIN: CPT

## 2020-10-14 PROCEDURE — 99214 OFFICE O/P EST MOD 30 MIN: CPT | Mod: S$GLB,,, | Performed by: INTERNAL MEDICINE

## 2020-10-14 PROCEDURE — 87491 CHLMYD TRACH DNA AMP PROBE: CPT

## 2020-10-14 PROCEDURE — 3078F PR MOST RECENT DIASTOLIC BLOOD PRESSURE < 80 MM HG: ICD-10-PCS | Mod: CPTII,S$GLB,, | Performed by: INTERNAL MEDICINE

## 2020-10-14 PROCEDURE — 3078F DIAST BP <80 MM HG: CPT | Mod: CPTII,S$GLB,, | Performed by: INTERNAL MEDICINE

## 2020-10-14 PROCEDURE — 3077F SYST BP >= 140 MM HG: CPT | Mod: CPTII,S$GLB,, | Performed by: INTERNAL MEDICINE

## 2020-10-14 RX ORDER — MELOXICAM 15 MG/1
15 TABLET ORAL DAILY
Qty: 30 TABLET | Refills: 3 | Status: SHIPPED | OUTPATIENT
Start: 2020-10-14 | End: 2020-10-30

## 2020-10-14 NOTE — PROGRESS NOTES
55-year-old male    For the past few weeks he has been having a burning pain over the lateral aspect of the left pelvic region.  Worse with prolonged standing worse with prolonged walking, associated with numb feeling in the left anterior shin and occasional pain in the right anterior thigh.  Recently he has noted a pain within his perineal or perineum area.  New    He was recently seen by me September 28th for low back pain but he states this pain migrated over to the left lateral side.  An MRI of the lumbar spine at the time did reveal minimal spinal stenosis L4-5 and L5-S1.    Actually yesterday or last night he took there are few and he thought he gave a degree of relief.  In the interim he was given a course of prednisone Vicodin help with the pain in undergoing physical therapy      And reviewed the chart since 2015 he is been seen by internal medicine as well as physical medicine for recurring left lower lumbar pain      Another issue is that his blood pressure was elevated but he did not take the amlodipine medicine that was prescribed    When he was seen in July, scrotal ultrasound was done to evaluate scrotal and perineal pain in results was unrevealing, PSA test was normal    Examination  Weight 185  Pulse 92  Blood pressure 144/92  Heart regular rate and rhythm  Abdominal exam active bowel sounds soft nontender no hepatosplenomegaly abdominal masses  2+ femoral pulses 2+ pedal pulses  Extremities no edema  2+ right knee reflex  Trace 1+ left knee reflex  2+ bilateral ankle jerk reflex  Was straight leg raise on the left there is pain in the low left lower back  With abduction the leg at the hip joint on the left there is pain in the inguinal region   Scrotal examination no abnormal findings no hernias  There appears to be soft tissue swelling over the left greater trochanter but presently not tender over the area but this is the area where he points to where there is burning and he points to an area  over the gluteus    Impression  Left pelvic pain  possible gluteal tendinitis and trochanteric bursitis  Perineal pain  Left leg numbness and weakness with diminished left knee reflex  Hypertension    Plan  MRI pelvis come MRI left hip  Mobic 15 mg once a day  Urinalysis  Repeat CBC, chemistry, urinalysis, inflammatory markers

## 2020-10-15 ENCOUNTER — PATIENT MESSAGE (OUTPATIENT)
Dept: INTERNAL MEDICINE | Facility: CLINIC | Age: 55
End: 2020-10-15

## 2020-10-15 ENCOUNTER — HOSPITAL ENCOUNTER (OUTPATIENT)
Dept: RADIOLOGY | Facility: OTHER | Age: 55
Discharge: HOME | End: 2020-10-15
Attending: INTERNAL MEDICINE
Payer: COMMERCIAL

## 2020-10-15 DIAGNOSIS — M25.552 PAIN IN JOINT INVOLVING PELVIC REGION AND THIGH, LEFT: ICD-10-CM

## 2020-10-15 DIAGNOSIS — R29.898 LEFT LEG WEAKNESS: ICD-10-CM

## 2020-10-15 DIAGNOSIS — R10.2 PERINEAL PAIN IN MALE: ICD-10-CM

## 2020-10-17 ENCOUNTER — HOSPITAL ENCOUNTER (OUTPATIENT)
Dept: RADIOLOGY | Facility: OTHER | Age: 55
Discharge: HOME | End: 2020-10-17
Attending: INTERNAL MEDICINE
Payer: COMMERCIAL

## 2020-10-17 PROCEDURE — 73721 MRI JNT OF LWR EXTRE W/O DYE: CPT | Mod: 26,LT,, | Performed by: RADIOLOGY

## 2020-10-17 PROCEDURE — 73721 MRI JNT OF LWR EXTRE W/O DYE: CPT | Mod: TC,LT

## 2020-10-17 PROCEDURE — 73721 MRI HIP WITHOUT CONTRAST LEFT: ICD-10-PCS | Mod: 26,LT,, | Performed by: RADIOLOGY

## 2020-10-17 PROCEDURE — 72195 MRI PELVIS W/O DYE: CPT | Mod: TC

## 2020-10-17 PROCEDURE — 72195 MRI PELVIS W/O DYE: CPT | Mod: 26,,, | Performed by: RADIOLOGY

## 2020-10-17 PROCEDURE — 72195 MRI PELVIS WITHOUT CONTRAST: ICD-10-PCS | Mod: 26,,, | Performed by: RADIOLOGY

## 2020-10-19 ENCOUNTER — PATIENT MESSAGE (OUTPATIENT)
Dept: INTERNAL MEDICINE | Facility: CLINIC | Age: 55
End: 2020-10-19

## 2020-10-19 DIAGNOSIS — M24.151 DEGENERATIVE TEAR OF ACETABULAR LABRUM OF RIGHT HIP: Primary | ICD-10-CM

## 2020-10-19 DIAGNOSIS — M16.11 PRIMARY OSTEOARTHRITIS OF RIGHT HIP: ICD-10-CM

## 2020-10-21 ENCOUNTER — OFFICE VISIT (OUTPATIENT)
Dept: ORTHOPEDICS | Facility: CLINIC | Age: 55
End: 2020-10-21
Payer: COMMERCIAL

## 2020-10-21 VITALS — HEIGHT: 74 IN | BODY MASS INDEX: 23.53 KG/M2 | WEIGHT: 183.31 LBS

## 2020-10-21 DIAGNOSIS — M53.3 SACROILIAC DYSFUNCTION: Primary | ICD-10-CM

## 2020-10-21 DIAGNOSIS — M16.11 PRIMARY OSTEOARTHRITIS OF RIGHT HIP: ICD-10-CM

## 2020-10-21 DIAGNOSIS — S39.012A STRAIN OF LUMBAR REGION, INITIAL ENCOUNTER: ICD-10-CM

## 2020-10-21 PROCEDURE — 99214 OFFICE O/P EST MOD 30 MIN: CPT | Mod: S$GLB,,, | Performed by: ORTHOPAEDIC SURGERY

## 2020-10-21 PROCEDURE — 99999 PR PBB SHADOW E&M-EST. PATIENT-LVL IV: ICD-10-PCS | Mod: PBBFAC,,, | Performed by: ORTHOPAEDIC SURGERY

## 2020-10-21 PROCEDURE — 3008F PR BODY MASS INDEX (BMI) DOCUMENTED: ICD-10-PCS | Mod: CPTII,S$GLB,, | Performed by: ORTHOPAEDIC SURGERY

## 2020-10-21 PROCEDURE — 99214 PR OFFICE/OUTPT VISIT, EST, LEVL IV, 30-39 MIN: ICD-10-PCS | Mod: S$GLB,,, | Performed by: ORTHOPAEDIC SURGERY

## 2020-10-21 PROCEDURE — 3008F BODY MASS INDEX DOCD: CPT | Mod: CPTII,S$GLB,, | Performed by: ORTHOPAEDIC SURGERY

## 2020-10-21 PROCEDURE — 99999 PR PBB SHADOW E&M-EST. PATIENT-LVL IV: CPT | Mod: PBBFAC,,, | Performed by: ORTHOPAEDIC SURGERY

## 2020-10-21 NOTE — LETTER
October 23, 2020      Mando Rice MD  [address]  [facility] [address]           [facility] 5th Fl  [address]  [facility] [address]  Phone: [phone]          Patient: Tobias Capellan   MR Number: [id number]   YOB: 1965   Date of Visit: 10/21/2020       Dear Dr. Mando Rice:    Thank you for referring Tobias Capellan to me for evaluation. Attached you will find relevant portions of my assessment and plan of care.    If you have questions, please do not hesitate to call me. I look forward to following Tobias Capellan along with you.    Sincerely,    Anderson Camilo MD    Enclosure  CC:  No Recipients    If you would like to receive this communication electronically, please contact [email or web address] or [phone] to request more information on [employer] access.    For providers and/or their staff who would like to refer a patient to [facility], please contact us through our one-stop-shop provider referral line, [facility] , at [phone].    If you feel you have received this communication in error or would no longer like to receive these types of communications, please e-mail [email or web address]

## 2020-10-21 NOTE — PROGRESS NOTES
Subjective:          Chief Complaint: Tobias Capellan is a 55 y.o. male who had concerns including Pain of the Left Hip.    HPI   Tobias Capellan is a 55 y.o. male with pmhx low back pain who presents for evaluation of left low back pain radiating to left hip. States about 3 weeks ago lifting a couch when he had onset of pain in left low back area. Denies warmth or numbness radiating down extremities at that time. Since then he continues to have pain in left low back, and it has migrated to the left gluteal region and lateral hip as well. The pain is worse with prolonged sitting and while laying flat on his back. He reports new associated decreased sensation at the medial aspect of his left lower leg. His PCP ordered MRI lumbar spine 9/29/2020 which showed mild degenerative changes with mild bilateral foraminal narrowing L4/5, mild right foraminal narrowing L5/S1.  MRI pelvis 10/14/2020 obtained to further evaluate hip joint and he would like to review those results today. Patient has tried meloxicam and stopped this medicine because he felt it was making him depressed. He does feel like his pain is improving.       Past Medical History:   Diagnosis Date    Glaucoma        Current Outpatient Medications on File Prior to Visit   Medication Sig Dispense Refill    amLODIPine (NORVASC) 5 MG tablet       brimonidine 0.2% (ALPHAGAN) 0.2 % Drop Apply 1 drop to eye.      brimonidine 0.2% (ALPHAGAN) 0.2 % Drop INSTILL 1 DROP INTO EACH EYE THREE TIMES DAILY      BRIMONIDINE TARTRATE/TIMOLOL (COMBIGAN OPHT) Apply to eye.      dextran 70-hypromellose (TEARS) ophthalmic solution Place 1 drop into both eyes as needed. 15 mL 0    dorzolamide-timolol 2-0.5% (COSOPT) 22.3-6.8 mg/mL ophthalmic solution Apply 1 drop to eye.      dorzolamide-timolol 2-0.5% (COSOPT) 22.3-6.8 mg/mL ophthalmic solution INSTILL 1 DROP INTO EACH EYE TWICE DAILY      LATANOPROST (XALATAN OPHT) Apply to eye.      meloxicam (MOBIC) 15 MG tablet Take  1 tablet (15 mg total) by mouth once daily. (Patient not taking: Reported on 10/21/2020) 30 tablet 3    moxifloxacin (VIGAMOX) 0.5 % ophthalmic solution Place 1 drop into the right eye 3 (three) times daily. (Patient not taking: Reported on 10/21/2020) 3 mL 0     No current facility-administered medications on file prior to visit.        Past Surgical History:   Procedure Laterality Date    EYE SURGERY         Family History   Problem Relation Age of Onset    Hypertension Brother     Kidney disease Brother        Social History     Socioeconomic History    Marital status: Single     Spouse name: Not on file    Number of children: Not on file    Years of education: Not on file    Highest education level: Not on file   Occupational History    Not on file   Social Needs    Financial resource strain: Not on file    Food insecurity     Worry: Not on file     Inability: Not on file    Transportation needs     Medical: Not on file     Non-medical: Not on file   Tobacco Use    Smoking status: Never Smoker    Smokeless tobacco: Never Used   Substance and Sexual Activity    Alcohol use: Yes     Comment: limited    Drug use: No    Sexual activity: Not on file   Lifestyle    Physical activity     Days per week: Not on file     Minutes per session: Not on file    Stress: Not on file   Relationships    Social connections     Talks on phone: Not on file     Gets together: Not on file     Attends Scientologist service: Not on file     Active member of club or organization: Not on file     Attends meetings of clubs or organizations: Not on file     Relationship status: Not on file   Other Topics Concern    Not on file   Social History Narrative    Not on file       ROS  Constitutional: negative for fevers  Eyes: negative visual changes  ENT: negative for hearing loss  Respiratory: negative for dyspnea  Cardiovascular: negative for chest pain  Gastrointestinal: negative for abdominal pain  Genitourinary: negative  for dysuria  Neurological: negative for headaches  Behavioral/Psych: negative for hallucinations  Endocrine: negative for temperature intolerance                Objective:        General: Tobias is well-developed, well-nourished, appears stated age, in no acute distress, alert and oriented to time, place and person.   General appearance: A&Ox3. NAD.   HEENT: Normocephalic, head atraumatic. Conjuntiva normal. EOMI. External appearance of nose, mouth, ears wnl.  Neck: Supple. Trachea midline.  Respiratory: Breathing unlabored on room air. Symmetric chest rise.   CV: Extremities warm and well perfused.  Neurologic: No focal motor or sensory deficits.   Psychatric: A&Ox3. Appropriate mood and affect.  Skin: Warm, dry, well perfused. No rash.      Ortho/SPM Exam  Left hip exam  Normal inspection  No TTP   Full, painless ROM  Hip flexion causes some pain in left groin  Straight leg raise does not cause back pain  Motor intact with 5/5 strength throughout  SILT except mild decreased sensation left medial lower leg  Foot WWP     Right hip exam  Normal inspection  No TTP   Full, painless ROM  Negative straight leg raise  Motor intact with 5/5 strength throughout  SILT throughout  Foot WWP     Spine exam  Paraspinal muscle tenderness left lumbar spine  TTP left sciatic region  No vertebral tenderness  No vertebral stepoff  5/5 strength throughout bilateral lower extremities  SILT except mild decreased sensation left medial lower leg      Imaging- independently reviewed and interpreted  XR B/L hip 7/1/2020: no significant osteoarthritis of left or right hip  XR lumbar spine 7/1/2020: mild L4/5 anterolisthesis  MRI lumbar spine 9/29/2020: mild degenerative changes with mild bilateral foraminal narrowing L4/5, mild right foraminal narrowing L5/S1  MRI pelvis 10/14/2020: Mild bilateral OA in both hips. No fracture or AVN. Radiologist notes slight irregularity at the 12 o'clock position of the superior labrum in the left hip  raising the question of small labral tear or chondromalacia at the chondrolabral junction.         Assessment:       55yoM with left low back pain most likely due to lumbar strain and sciatica. No significant left hip osteoarthritis.    Encounter Diagnoses   Name Primary?    Primary osteoarthritis of right hip     Sacroiliac dysfunction Yes    Strain of lumbar region, initial encounter           Plan:       Referral placed to Sports Medicine (Dr. Solitario) for manipulation/targeted injection  Counseled to continue supportive care- antiinflammatories, tylenol, ice, rest                      Patient questionnaires may have been collected.

## 2020-10-27 ENCOUNTER — PROCEDURE VISIT (OUTPATIENT)
Dept: PHYSICAL MEDICINE AND REHAB | Facility: CLINIC | Age: 55
End: 2020-10-27
Payer: COMMERCIAL

## 2020-10-27 DIAGNOSIS — R20.0 LEFT LEG NUMBNESS: ICD-10-CM

## 2020-10-27 DIAGNOSIS — R29.898 LEFT LEG WEAKNESS: ICD-10-CM

## 2020-10-27 PROCEDURE — 95911 PR NERVE CONDUCTION STUDY; 9-10 STUDIES: ICD-10-PCS | Mod: S$GLB,,, | Performed by: PHYSICAL MEDICINE & REHABILITATION

## 2020-10-27 PROCEDURE — 95886 MUSC TEST DONE W/N TEST COMP: CPT | Mod: S$GLB,,, | Performed by: PHYSICAL MEDICINE & REHABILITATION

## 2020-10-27 PROCEDURE — 95911 NRV CNDJ TEST 9-10 STUDIES: CPT | Mod: S$GLB,,, | Performed by: PHYSICAL MEDICINE & REHABILITATION

## 2020-10-27 PROCEDURE — 95886 PR EMG COMPLETE, W/ NERVE CONDUCTION STUDIES, 5+ MUSCLES: ICD-10-PCS | Mod: S$GLB,,, | Performed by: PHYSICAL MEDICINE & REHABILITATION

## 2020-10-27 NOTE — PROCEDURES
Test Date:  10/27/2020    Patient: Tobias Capellan : 1965 Physician: Ermias White D.O.   ID#:  SEX: Male Ref. Phys: Mando Rice MD     HPI: Tobias Capellan is a 55 y.o.male who presents for NCS/EMG to evaluate left leg weakness/numbness that began with low back pain.    NCV & EMG Findings:   Evaluation of the left Tibial (AHB) motor and the right Tibial (AHB) motor nerves showed reduced amplitude.   All remaining nerves (as indicated in the following tables) were within normal limits.   H-reflex studies indicate that the left tibial has prolonged latency (36.0 ms).  However, when corrected for height (leg length 44 cm from pop fossa to medial mal), these are normal.     The right tibial has prolonged latency (35.1 ms).   Needle evaluation of the left Tibialis Posterior muscle showed diminished recruitment.   The left Tibialis Anterior muscle showed increased insertional activity and slightly increased spontaneous activity.   The left Abductor Hallucis muscle showed increased motor unit amplitude, increased motor unit duration, and diminished recruitment.   All remaining muscles (as indicated in the following table) showed no evidence of electrical instability.    Impression:  This was an abnormal study, with evidence to suggest a possible left L5/S1 radiculopathy, though not definitively so.  Explanation: There was a small tibial CMAP (bilaterally), mildly prolonged left tibial F wave, and mildly prolonged bilateral H-reflexes on NCS. However, when corrected for height/leg length, the H-reflex was normal bilaterally.  The fact that the tibial motor response was symmetrically small on the asymptomatic side indicates that this could be anatomic variability rather than pathologic.  The prolonged F-wave may be due to his height as well.  On needle EMG, there were chronic neuropathic changes in the left AHB, reduced recruitment of the left tib posterior, and fasciculation potential in the  left gastrocnemius (all tibial innervated and all with L5/S1 innervation).  However, there were also signs of denervation in the left tib anterior (not tibial innervated, but does have L5 supply).  Therefore, most of these changes can be explained by an L5 and/or S1 radiculopathy or less likely, a left tibial neuropathy.  Clinically, he does have low back pain with radicular symptoms.         ___________________________  Ermias White D.O.        NCS+  Motor Nerve Results      Latency Amplitude F-Lat Segment Distance CV Comment   Site (ms) Norm (mV) Norm (ms)  (cm) (m/s) Norm    Left Fibular (EDB)   Ankle 3.5  < 6.5 7.3  > 1.10         Bel Fib Head 12.1 - 5.9 -  Bel Fib Head-Ankle 39 45  > 36    Right Fibular (EDB)   Ankle 3.6  < 6.5 5.2  > 1.10         Bel Fib Head 11.9 - 4.9 -  Bel Fib Head-Ankle 38.5 46  > 36    Left Tibial (AHB)   Ankle 3.5  < 6.1 *3.4  > 5.3 *61.9        Knee 14.5 - 3.0 -  Knee-Ankle 46 42  > 34    Right Tibial (AHB)   Ankle 3.3  < 6.1 *2.8  > 5.3           Sensory Nerve Results      Latency (Peak) Amplitude (P-P) Segment Distance CV Comment   Site (ms) Norm (µV) Norm  (cm) (m/s) Norm    Left Sural   Calf-Lat Mall 4.2  < 4.5 27  > 4 Calf-Lat Mall 15 36  > 35    Right Sural   Calf-Lat Mall 4.2  < 4.5 16  > 4 Calf-Lat Mall 14 47  > 35    Left Superficial Fibular   14 cm-Ankle 3.3  < 4.2 9  > 5 14 cm-Ankle 14 42  > 32      H-Reflex Results      M-Lat H Lat H-M Lat   Site (ms) (ms) Norm (ms)   Left Tibial (Soleus)   Pop Fossa 10.2 *36.0 27.2...33.4 25.8   Right Tibial (Soleus)   Pop Fossa - *35.1 27.2...33.4 -     EMG+     Side Muscle Nerve Root Ins Act Fibs Psw Amp Dur Poly Recrt Int Pat Comment   Left Vastus Med Femoral L2-L4 Nml Nml Nml Nml Nml 0 Nml Nml    Left Tib Posterior Tibial L5-S1 Nml Nml Nml Nml Nml 0 *Reduced Nml    Left Gastroc Tibial S1-S2 Nml Nml Nml Nml Nml 0 Nml Nml + fascic   Left Biceps Fem SH Sciatic L5-S1 Nml Nml Nml Nml Nml 0 Nml Nml    Left Lumbo Parasp (Mid) Rami  L3-L4 Nml Nml Nml         Left Lumbo Parasp (Lower) Rami L5-S1 Nml Nml Nml         Left Gluteus Max Inf Gluteal L5-S2 Nml Nml Nml Nml Nml 0 Nml Nml    Left Gluteus Med Sup Gluteal L5-S1 Nml Nml Nml Nml Nml 0 Nml Nml    Left Fib Longus Fibular,  Superficial... L5-S1 Nml Nml Nml Nml Nml 0 Nml Nml    Left Tib Anterior Fibular,  Deep Fibula... L4-L5 *Incr Nml *1+ Nml Nml 0 Nml Nml    Left FHL Tibial S1-S2 Nml Nml Nml Nml Nml 0 Nml Nml    Left FDI Pedis Lateral Plantar,  Perla... S1-S2 Nml Nml Nml Nml Nml 0 Nml Nml    Left AHB Tibial,  Medial Plant... S1-S2 Nml Nml Nml *Incr *>12ms 0 *Reduced Nml unit is 20 mV Amp           Waveforms:    Motor              F-Wave     Sensory         H-Reflex

## 2020-10-30 ENCOUNTER — IMMUNIZATION (OUTPATIENT)
Dept: INTERNAL MEDICINE | Facility: CLINIC | Age: 55
End: 2020-10-30
Payer: COMMERCIAL

## 2020-10-30 ENCOUNTER — PATIENT OUTREACH (OUTPATIENT)
Dept: ADMINISTRATIVE | Facility: HOSPITAL | Age: 55
End: 2020-10-30

## 2020-10-30 ENCOUNTER — OFFICE VISIT (OUTPATIENT)
Dept: INTERNAL MEDICINE | Facility: CLINIC | Age: 55
End: 2020-10-30
Payer: COMMERCIAL

## 2020-10-30 ENCOUNTER — LAB VISIT (OUTPATIENT)
Dept: LAB | Facility: HOSPITAL | Age: 55
End: 2020-10-30
Attending: INTERNAL MEDICINE
Payer: COMMERCIAL

## 2020-10-30 VITALS
WEIGHT: 182 LBS | BODY MASS INDEX: 24.12 KG/M2 | SYSTOLIC BLOOD PRESSURE: 140 MMHG | HEIGHT: 73 IN | HEART RATE: 79 BPM | DIASTOLIC BLOOD PRESSURE: 82 MMHG | OXYGEN SATURATION: 98 %

## 2020-10-30 DIAGNOSIS — I10 ESSENTIAL HYPERTENSION: ICD-10-CM

## 2020-10-30 DIAGNOSIS — Z00.00 ANNUAL PHYSICAL EXAM: ICD-10-CM

## 2020-10-30 DIAGNOSIS — M16.11 PRIMARY OSTEOARTHRITIS OF RIGHT HIP: ICD-10-CM

## 2020-10-30 DIAGNOSIS — M47.27 LUMBOSACRAL SPONDYLOSIS WITH RADICULOPATHY: ICD-10-CM

## 2020-10-30 DIAGNOSIS — Z00.00 ANNUAL PHYSICAL EXAM: Primary | ICD-10-CM

## 2020-10-30 LAB
ALBUMIN SERPL BCP-MCNC: 3.8 G/DL (ref 3.5–5.2)
ALP SERPL-CCNC: 61 U/L (ref 55–135)
ALT SERPL W/O P-5'-P-CCNC: 26 U/L (ref 10–44)
ANION GAP SERPL CALC-SCNC: 8 MMOL/L (ref 8–16)
AST SERPL-CCNC: 22 U/L (ref 10–40)
BILIRUB SERPL-MCNC: 0.5 MG/DL (ref 0.1–1)
BUN SERPL-MCNC: 12 MG/DL (ref 6–20)
CALCIUM SERPL-MCNC: 9 MG/DL (ref 8.7–10.5)
CHLORIDE SERPL-SCNC: 104 MMOL/L (ref 95–110)
CHOLEST SERPL-MCNC: 182 MG/DL (ref 120–199)
CHOLEST/HDLC SERPL: 2.4 {RATIO} (ref 2–5)
CO2 SERPL-SCNC: 28 MMOL/L (ref 23–29)
CREAT SERPL-MCNC: 0.9 MG/DL (ref 0.5–1.4)
EST. GFR  (AFRICAN AMERICAN): >60 ML/MIN/1.73 M^2
EST. GFR  (NON AFRICAN AMERICAN): >60 ML/MIN/1.73 M^2
GLUCOSE SERPL-MCNC: 77 MG/DL (ref 70–110)
HDLC SERPL-MCNC: 75 MG/DL (ref 40–75)
HDLC SERPL: 41.2 % (ref 20–50)
LDLC SERPL CALC-MCNC: 96.2 MG/DL (ref 63–159)
NONHDLC SERPL-MCNC: 107 MG/DL
POTASSIUM SERPL-SCNC: 3.7 MMOL/L (ref 3.5–5.1)
PROT SERPL-MCNC: 7 G/DL (ref 6–8.4)
SODIUM SERPL-SCNC: 140 MMOL/L (ref 136–145)
TRIGL SERPL-MCNC: 54 MG/DL (ref 30–150)

## 2020-10-30 PROCEDURE — 3079F PR MOST RECENT DIASTOLIC BLOOD PRESSURE 80-89 MM HG: ICD-10-PCS | Mod: CPTII,S$GLB,, | Performed by: INTERNAL MEDICINE

## 2020-10-30 PROCEDURE — 3008F BODY MASS INDEX DOCD: CPT | Mod: CPTII,S$GLB,, | Performed by: INTERNAL MEDICINE

## 2020-10-30 PROCEDURE — 90686 FLU VACCINE (QUAD) GREATER THAN OR EQUAL TO 3YO PRESERVATIVE FREE IM: ICD-10-PCS | Mod: S$GLB,,, | Performed by: FAMILY MEDICINE

## 2020-10-30 PROCEDURE — 3077F SYST BP >= 140 MM HG: CPT | Mod: CPTII,S$GLB,, | Performed by: INTERNAL MEDICINE

## 2020-10-30 PROCEDURE — 80053 COMPREHEN METABOLIC PANEL: CPT

## 2020-10-30 PROCEDURE — 90686 IIV4 VACC NO PRSV 0.5 ML IM: CPT | Mod: S$GLB,,, | Performed by: FAMILY MEDICINE

## 2020-10-30 PROCEDURE — 90471 FLU VACCINE (QUAD) GREATER THAN OR EQUAL TO 3YO PRESERVATIVE FREE IM: ICD-10-PCS | Mod: S$GLB,,, | Performed by: FAMILY MEDICINE

## 2020-10-30 PROCEDURE — 3079F DIAST BP 80-89 MM HG: CPT | Mod: CPTII,S$GLB,, | Performed by: INTERNAL MEDICINE

## 2020-10-30 PROCEDURE — 90471 IMMUNIZATION ADMIN: CPT | Mod: S$GLB,,, | Performed by: FAMILY MEDICINE

## 2020-10-30 PROCEDURE — 3077F PR MOST RECENT SYSTOLIC BLOOD PRESSURE >= 140 MM HG: ICD-10-PCS | Mod: CPTII,S$GLB,, | Performed by: INTERNAL MEDICINE

## 2020-10-30 PROCEDURE — 3008F PR BODY MASS INDEX (BMI) DOCUMENTED: ICD-10-PCS | Mod: CPTII,S$GLB,, | Performed by: INTERNAL MEDICINE

## 2020-10-30 PROCEDURE — 99999 PR PBB SHADOW E&M-EST. PATIENT-LVL III: CPT | Mod: PBBFAC,,, | Performed by: INTERNAL MEDICINE

## 2020-10-30 PROCEDURE — 80061 LIPID PANEL: CPT

## 2020-10-30 PROCEDURE — 99396 PREV VISIT EST AGE 40-64: CPT | Mod: S$GLB,,, | Performed by: INTERNAL MEDICINE

## 2020-10-30 PROCEDURE — 99396 PR PREVENTIVE VISIT,EST,40-64: ICD-10-PCS | Mod: S$GLB,,, | Performed by: INTERNAL MEDICINE

## 2020-10-30 PROCEDURE — 99999 PR PBB SHADOW E&M-EST. PATIENT-LVL III: ICD-10-PCS | Mod: PBBFAC,,, | Performed by: INTERNAL MEDICINE

## 2020-10-30 PROCEDURE — 36415 COLL VENOUS BLD VENIPUNCTURE: CPT

## 2020-10-30 RX ORDER — ETODOLAC 400 MG/1
400 TABLET, FILM COATED ORAL 2 TIMES DAILY
Qty: 30 TABLET | Refills: 0 | Status: SHIPPED | OUTPATIENT
Start: 2020-10-30 | End: 2020-11-14

## 2020-10-30 NOTE — PROGRESS NOTES
MEDICAL HISTORY  Hypertension  Glaucoma  Lumbar spondylosis with lumbar radiculopathy  Osteoarthritis hip      Social history  Tobacco use none  Alcohol use limited  Works as  diabetes hypertension    Family history  Noted in epic medical record    history diabetes hypertension    Screening  Reported colonoscopy at [facility] 2017 normal  PSA July 2020 normal        55-year-old male  This appointment was set up is a routine visit    He has been having for while ongoing pain involving the left back, buttocks, hip, leg  Feels pain in the left buttocks that extends down anterior aspect of the thigh and shin  MRI lumbar spine revealed degenerative changes at L4-5 and L5-S1 with degrees of neural foraminal stenosis  MRI hip/pelvis show minimal arthritis left hip  Recent EMG showed the possibility although not conclusive of L5-S1 radiculopathy  Recently evaluated by orthopedics and being referred to physical therapy and to another orthopedic physician regarding hip and spine manipulation      Another issue is that on his previous visits since some July, his blood pressures been elevated, amlodipine was recommended prescribe but he has not taking the medication    Review of symptoms  Negative for chest pain, palpitations, shortness of breath, abdominal pain, indigestion heartburn, chronic headaches  Regular bowel function  No difficulty urinating, occasional nocturia    Examination  Weight 182  Pulse 82  Blood pressure by me 158/80  HEENT exam no abnormal findings  Neck no thyromegaly no masses  Chest clear breath sounds  Heart regular rate rhythm no murmurs  Abdominal exam nontender soft no hepatosplenomegaly abdominal masses  Pulses 2+ carotid pulses no bruits 2+ pedal pulses  Extremities no edema  No pain with abduction, rotation, flexion of the left leg at the hip joint    Impression  General exam  Hypertension  Glaucoma  Chronic lumbar radiculopathy  Chronic left hip pain    Plan  Referred to the Pain Center  determine if injections are needed  Repeat chemistry with hepatic function panel and lipid profile today  Trial of Lodine 400 mg twice a day for the next 2 weeks see if is helped    he was given a trial of Mobic but he thought it made him depressed    He defers on being on medicine for his blood pressure but agrees to the digital hypertension medicine program which I think is appropriate

## 2020-10-31 NOTE — PROGRESS NOTES
Chemistry and lipid profile looks fine    Follow-up with orthopedics as directed as well as the referral to the pain clinic

## 2020-11-03 ENCOUNTER — DOCUMENTATION ONLY (OUTPATIENT)
Dept: REHABILITATION | Facility: OTHER | Age: 55
End: 2020-11-03

## 2020-11-03 NOTE — PROGRESS NOTES
Pt failed to appear for physical therapy evaluation without notification today.      Jonna Adamson, PT

## 2020-11-11 ENCOUNTER — OFFICE VISIT (OUTPATIENT)
Dept: ORTHOPEDICS | Facility: CLINIC | Age: 55
End: 2020-11-11
Payer: COMMERCIAL

## 2020-11-11 VITALS
HEIGHT: 73 IN | SYSTOLIC BLOOD PRESSURE: 160 MMHG | WEIGHT: 182 LBS | DIASTOLIC BLOOD PRESSURE: 82 MMHG | BODY MASS INDEX: 24.12 KG/M2

## 2020-11-11 DIAGNOSIS — M99.03 SOMATIC DYSFUNCTION OF LUMBAR REGION: ICD-10-CM

## 2020-11-11 DIAGNOSIS — M99.05 SOMATIC DYSFUNCTION OF PELVIS REGION: ICD-10-CM

## 2020-11-11 DIAGNOSIS — M53.3 SACROILIAC DYSFUNCTION: Primary | ICD-10-CM

## 2020-11-11 DIAGNOSIS — M99.06 SOMATIC DYSFUNCTION OF LOWER EXTREMITY: ICD-10-CM

## 2020-11-11 DIAGNOSIS — M79.10 MYALGIA: ICD-10-CM

## 2020-11-11 DIAGNOSIS — M99.04 SOMATIC DYSFUNCTION OF SACRAL REGION: ICD-10-CM

## 2020-11-11 DIAGNOSIS — M99.02 SOMATIC DYSFUNCTION OF THORACIC REGION: ICD-10-CM

## 2020-11-11 PROCEDURE — 97110 PR THERAPEUTIC EXERCISES: ICD-10-PCS | Mod: S$GLB,,, | Performed by: ORTHOPAEDIC SURGERY

## 2020-11-11 PROCEDURE — 97110 THERAPEUTIC EXERCISES: CPT | Mod: S$GLB,,, | Performed by: ORTHOPAEDIC SURGERY

## 2020-11-11 PROCEDURE — 99204 OFFICE O/P NEW MOD 45 MIN: CPT | Mod: 25,S$GLB,, | Performed by: ORTHOPAEDIC SURGERY

## 2020-11-11 PROCEDURE — 99999 PR PBB SHADOW E&M-EST. PATIENT-LVL III: ICD-10-PCS | Mod: PBBFAC,,, | Performed by: ORTHOPAEDIC SURGERY

## 2020-11-11 PROCEDURE — 1125F PR PAIN SEVERITY QUANTIFIED, PAIN PRESENT: ICD-10-PCS | Mod: S$GLB,,, | Performed by: ORTHOPAEDIC SURGERY

## 2020-11-11 PROCEDURE — 3077F PR MOST RECENT SYSTOLIC BLOOD PRESSURE >= 140 MM HG: ICD-10-PCS | Mod: CPTII,S$GLB,, | Performed by: ORTHOPAEDIC SURGERY

## 2020-11-11 PROCEDURE — 3008F BODY MASS INDEX DOCD: CPT | Mod: CPTII,S$GLB,, | Performed by: ORTHOPAEDIC SURGERY

## 2020-11-11 PROCEDURE — 1125F AMNT PAIN NOTED PAIN PRSNT: CPT | Mod: S$GLB,,, | Performed by: ORTHOPAEDIC SURGERY

## 2020-11-11 PROCEDURE — 98927 OSTEOPATH MANJ 5-6 REGIONS: CPT | Mod: S$GLB,,, | Performed by: ORTHOPAEDIC SURGERY

## 2020-11-11 PROCEDURE — 98927 PR OSTEOPATHIC MANIP,5-6 BODY REGN: ICD-10-PCS | Mod: S$GLB,,, | Performed by: ORTHOPAEDIC SURGERY

## 2020-11-11 PROCEDURE — 3079F DIAST BP 80-89 MM HG: CPT | Mod: CPTII,S$GLB,, | Performed by: ORTHOPAEDIC SURGERY

## 2020-11-11 PROCEDURE — 3079F PR MOST RECENT DIASTOLIC BLOOD PRESSURE 80-89 MM HG: ICD-10-PCS | Mod: CPTII,S$GLB,, | Performed by: ORTHOPAEDIC SURGERY

## 2020-11-11 PROCEDURE — 99204 PR OFFICE/OUTPT VISIT, NEW, LEVL IV, 45-59 MIN: ICD-10-PCS | Mod: 25,S$GLB,, | Performed by: ORTHOPAEDIC SURGERY

## 2020-11-11 PROCEDURE — 99999 PR PBB SHADOW E&M-EST. PATIENT-LVL III: CPT | Mod: PBBFAC,,, | Performed by: ORTHOPAEDIC SURGERY

## 2020-11-11 PROCEDURE — 3008F PR BODY MASS INDEX (BMI) DOCUMENTED: ICD-10-PCS | Mod: CPTII,S$GLB,, | Performed by: ORTHOPAEDIC SURGERY

## 2020-11-11 PROCEDURE — 3077F SYST BP >= 140 MM HG: CPT | Mod: CPTII,S$GLB,, | Performed by: ORTHOPAEDIC SURGERY

## 2020-11-11 NOTE — PROGRESS NOTES
"CC: left low back pain    55 y.o. Male presents today for evaluation of his left low back pain. The pain began in September after lifting a couch and noticing a "tweak". The next day he noticed a "burning" pain in his upper left thigh with progression of symptoms since then to include low back pain that radiates down to his glute and around to his medial hip/groin area, a tightness in a L2-L3 distribution, and numbness in his anterior shin. He rates the pain as a 5-6/10. He reports that he now walks differently, as he feels his hip is "jammed up". He notes lying on his stomach or left side makes the pain worse, but lying supine or on his right side does not bother him. He also feels that his leg feels weak and heavy. He denies feelings of instability. He has had a IM shot of Toradol without relief. He has not tried formal therapy or steroid injection.     How long: 3 months  What makes it better: Rest  What makes it worse: Activity or lying in certain positions  Does it radiate: Yes, as above  Numbness/tingling down legs: Yes, anterior shin  Attempted treatments: IM toradol  Pain score: 5-6/10  Any mechanical symptoms: No  Feelings of instability: No  Problems with ADLs: No    REVIEW OF SYSTEMS:   Constitution: Patient denies fever, chills,and weight changes. He has noted several instances of night sweats.  Eyes: Patient denies eye pain or vision changes.  HENT: Patient denies headache, ear pain, sore throat, or nasal discharge.  CVS: Patient denies chest pain.  Lungs: Patient denies shortness of breath or cough.  Abd: Patient denies stomach pain, nausea, or vomiting.  Skin: Patient denies skin rash or itching.    Hematologic/Lymphatic: Patient denies easy bruising.   Musculoskeletal: Patient denies recent falls. See HPI.  Psych: Patient admits some anxiety.    PAST MEDICAL HISTORY:   Past Medical History:   Diagnosis Date    Glaucoma        PAST SURGICAL HISTORY:   Past Surgical History:   Procedure Laterality " Date    EYE SURGERY      glaucoma       FAMILY HISTORY:   Family History   Problem Relation Age of Onset    Hypertension Mother     Hypertension Brother     Kidney disease Brother     Heart disease Brother     Cancer Brother         lung    Hypertension Brother        SOCIAL HISTORY:   Social History     Socioeconomic History    Marital status: Single     Spouse name: Not on file    Number of children: Not on file    Years of education: Not on file    Highest education level: Not on file   Occupational History    Not on file   Social Needs    Financial resource strain: Not on file    Food insecurity     Worry: Not on file     Inability: Not on file    Transportation needs     Medical: Not on file     Non-medical: Not on file   Tobacco Use    Smoking status: Never Smoker    Smokeless tobacco: Never Used   Substance and Sexual Activity    Alcohol use: Yes     Comment: limited    Drug use: No    Sexual activity: Not on file   Lifestyle    Physical activity     Days per week: Not on file     Minutes per session: Not on file    Stress: Not on file   Relationships    Social connections     Talks on phone: Not on file     Gets together: Not on file     Attends Denominational service: Not on file     Active member of club or organization: Not on file     Attends meetings of clubs or organizations: Not on file     Relationship status: Not on file   Other Topics Concern    Not on file   Social History Narrative    Not on file       MEDICATIONS:     Current Outpatient Medications:     amLODIPine (NORVASC) 5 MG tablet, , Disp: , Rfl:     brimonidine 0.2% (ALPHAGAN) 0.2 % Drop, Apply 1 drop to eye., Disp: , Rfl:     brimonidine 0.2% (ALPHAGAN) 0.2 % Drop, INSTILL 1 DROP INTO EACH EYE THREE TIMES DAILY, Disp: , Rfl:     BRIMONIDINE TARTRATE/TIMOLOL (COMBIGAN OPHT), Apply to eye., Disp: , Rfl:     dextran 70-hypromellose (TEARS) ophthalmic solution, Place 1 drop into both eyes as needed. (Patient not  "taking: Reported on 10/30/2020), Disp: 15 mL, Rfl: 0    dorzolamide-timolol 2-0.5% (COSOPT) 22.3-6.8 mg/mL ophthalmic solution, Apply 1 drop to eye., Disp: , Rfl:     dorzolamide-timolol 2-0.5% (COSOPT) 22.3-6.8 mg/mL ophthalmic solution, INSTILL 1 DROP INTO EACH EYE TWICE DAILY, Disp: , Rfl:     etodolac (LODINE) 400 MG tablet, Take 1 tablet (400 mg total) by mouth 2 (two) times daily. for 15 days, Disp: 30 tablet, Rfl: 0    LATANOPROST (XALATAN OPHT), Apply to eye., Disp: , Rfl:     moxifloxacin (VIGAMOX) 0.5 % ophthalmic solution, Place 1 drop into the right eye 3 (three) times daily. (Patient not taking: Reported on 10/21/2020), Disp: 3 mL, Rfl: 0    ALLERGIES:   Review of patient's allergies indicates:  No Known Allergies     PHYSICAL EXAMINATION:  BP (!) 160/82   Ht 6' 1" (1.854 m)   Wt 82.6 kg (182 lb)   BMI 24.01 kg/m²   Vitals signs and nursing note have been reviewed.  General: In no acute distress, well developed, well nourished, no diaphoresis  Eyes: EOM full and smooth, no eye redness or discharge  HENT: normocephalic and atraumatic, neck supple, trachea midline, no nasal discharge, no external ear redness or discharge  Cardiovascular: no LE edema  Lungs: respirations non-labored, no conversational dyspnea   Abd: non-distended, no rigidity  MSK: no amputation or deformity, no swelling of extremities  Neuro: AAOx3, CN2-12 grossly intact  Skin: No rashes, warm and dry  Psychiatric: cooperative, pleasant, mood and affect appropriate for age    Lumbar Spine: left lumbar region    Observation:    Normal cervicothoracolumbar curves.    No obvious pelvic obliquity while standing.    No edema, erythema, or ecchymosis noted in lumbosacral region.    No midline skin abnormalities.    No atrophy of lower limb musculature.    Tenderness:  No tenderness throughout the lumbar spine or posterior pelvis. Pt does have some tenderness along the iliolumbar area near L2-L3 and lower in the glute in the S1 " distribution.   + tenderness over the sacrum, left base and SONALI   No tenderness over the piriformis, greater/lesser trochanters.  No bony deformities or step-offs palpated.     Range of Motion:  Active flexion to 60°.   Active extension to 25°.   Active rotation to 30° on left and 30° on right.    Active sidebending to 25° on left and 25° on right.  Passive hip flexion to 135° on left and 135° on right.    Passive hip internal rotation to 45° on left and 45° on right.    Passive hip external rotation to 45° on left and 45° on right.    All ROM testing on the right is without pain.  Hip flexion and rotation aggravates his pain.    Strength Testing:  Hip flexion - 5/5 on left and 5/5 on right  Hip extension - 5/5 on left and 5/5 on right  Knee flexion - 5/5 on left and 5/5 on right  Knee extension - 5/5 on left and 5/5 on right  Dorsiflexion - 5/5 on left and 5/5 on right  Plantarflexion - 5/5 on left and 5/5 on right      Special Tests:  Seated straight leg raise - negative on left and negative on right  Supine straight leg raise - negative on left and negative on right  Slump test - negative on left and negative on right  Provocation maneuvers exhibit no worsening of symptoms on left or on right.    SANCHEZ test - negative  FADIR test - negative  Log roll test - negative    Posture:  Upright and Increased thoracic kyphosis  Gait: Left antalgic    TART (Tissue texture abnormality, Asymmetry,  Restriction of motion and/or Tenderness) changes:    Head:     Cervical Spine  Thoracic Spine  Lumbar Spine   C1 Neutral T1 Neutral L1 TTAL   C2 Neutral T2 Neutral L2 TTAL   C3 Neutral T3 Neutral L3 Neutral   C4 Neutral T4 Neutral L4 ERSL   C5 Neutral T5 Neutral L5 FRSR   C6 Neutral T6 Neutral     C7 Neutral T7 Neutral       T8 TTAL       T9 TTAL       T10 TTAL       T11 TTAL       T12 TTAL       Ribs:    Upper Extremity:    Abdomen:    Pelvis:  · Innominate:Left superior shear  · Pubic bone:Left superior pubic  shear    Sacrum:Left on Right sacral torsion   fascial continuum distortion at the left sacral base    Lower Extremity:   myofascial restriction left lower extremity   fascial herniated trigger point mid glute on the left    Key   F= Flexed   E = Extended   R = Rotated   S = Sidebent   TTA = tissue texture abnormality       Neurovascular Exam:  Reflexes +2/4 at the L4 and S1 dermatomes.  Sensation intact to light touch in the L2-S2 dermatomes bilaterally.   No pretibial edema or abnormal hair pattern of the shin.    Intact and symmetric DP and PT pulses bilaterally.  Normal gait without trendelenberg, heel walking, toe walking, and tandem walking.    IMAGIN. X-ray (lumbar) obtained on 2012 due to left lumbar pain  2. X-ray images were reviewed personally by me and then directly with patient.  3. FINDINGS: X-ray images obtained demonstrate mild DJD.  There is a grade 1 L4/L5 anterolisthesis noted.  The lumbosacral disc space is narrowed and slight narrowing of the L4/L5 disc space also identified.  No fracture or dislocation.  No bone destruction identified  4. IMPRESSION: As above    IMAGIN. X-ray (bilateral hips) obtained on 2020 due to left lumbar pain  2. X-ray images were reviewed personally by me and then directly with patient.  3. FINDINGS: X-ray images obtained demonstrate mild DJD.  No fracture or dislocation.  No bone destruction identified.  4. IMPRESSION: As above    IMAGIN.MRI (Lumbar) obtained on 2020 due to left lumbar pain  2. MRI images were reviewed personally by me and then directly with patient.  3. FINDINGS: MRI images obtained demonstrate Alignment: Trace anterolisthesis L4 on L5. Vertebrae: Normal marrow signal. No fracture. Discs: Normal height and signal. Cord: Normal.  Conus terminates at L1. Degenerative findings:T12-L1: Unremarkable L1-L2: Unremarkable L2-L3: Small circumferential disc bulge.  No nerve root compression.  L3-L4: Small circumferential disc  bulge.  No nerve root compression. L4-L5: Slight uncovering of the disc with circumferential disc bulge and facet arthropathy.  Mild bilateral neural foraminal narrowing.  Mild canal stenosis. L5-S1: Circumferential disc bulge, paracentral to the right and facet arthropathy.  Mild right neural foraminal narrowing and narrowing of the right lateral recess. Paraspinal muscles & soft tissues: Unremarkable.  4. IMPRESSION: As above    IMAGIN.MRI (bilateral hips) obtained on 10/17/2020 due to left lumbar pain  2. MRI images were reviewed personally by me and then directly with patient.  3. FINDINGS: MRI images obtained demonstrate The left hip does not appear to be injured in the coronal or axial plane on T2 fat-suppressed weighted imaging sequences.  Specifically there does not appear to be any obvious disruption of the cortices nor does there appear to be evidence of bone bruising or bone edema.  No evidence of a joint effusion.  No indication of signal abnormalities involving the adductor or abductor muscles.  The appearance of the left hip is symmetric with the appearance of the right.  There is a small amount of osteoarthritic changes involving the acetabular roof as evidence by sclerosis of the area seen symmetrically from right to left.  4. IMPRESSION: As above    IMAGIN.MRI (pelvis) obtained on 10/17/2020 due to left lumbar pain  2. MRI images were reviewed personally by me and then directly with patient.  3. FINDINGS: MRI images obtained demonstrate that the pelvic bones appear intact without bone marrow edema or fracture.  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  There is a small focus of increased signal intensity in the superolateral labrum at the 12 o'clock position on T1 weighted sequences that is not confidently identified on T2 weighted sequences raising the question of either chondromalacia or small superior labral tear. There is no effusion.  The hip rotators adductors  and trochanteric muscles and tendons appear normal.  There is no significant bursitis.  The hamstring tendon insertions appear normal.  The sciatic nerve course appears on and pinched.  The visualized lumbosacral junction appears unremarkable. The seminal vesicles are slightly congested.  The prostate gland is not enlarged.  The urinary bladder appears unremarkable.  The loops of large and small intestines appear unremarkable.  The vascular structures appear unremarkable.  4. IMPRESSION: As above        ASSESSMENT:      ICD-10-CM ICD-9-CM   1. Sacroiliac dysfunction  M53.3 724.6   2. Myalgia  M79.10 729.1   3. Somatic dysfunction of lumbar region  M99.03 739.3   4. Somatic dysfunction of pelvis region  M99.05 739.5   5. Somatic dysfunction of thoracic region  M99.02 739.2   6. Somatic dysfunction of sacral region  M99.04 739.4   7. Somatic dysfunction of lower extremity  M99.06 739.6         PLAN:  1-2. SI dysfunction/myalgia -     - Tobias is here today for left-sided low back and hip pain.  He has previously seen Dr. Camilo who recommended that he follow-up with sports medicine for evaluation.  He has increased pain with walking and all motions of the left hip.  He has had extensive imaging studies obtained which have not demonstrated a specific cause of his pain up to this point.    - XR and MRI  Studies obtained in July, September, October 2020 were personally reviewed by me.  See above for further detail.    - Based on his description/body language of pain and somatic dysfunction identified on exam, I discussed osteopathic manipulation as a treatment option today.  He consents to evaluation and treatment.  See below.    - HEP for core strengthening and pelvic mobility and glute retraining prescribed today. Handout provided, explained, and exercises were demonstrated as needed. Encouraged to do daily. [address] HOME EXERCISE PROGRAM (HEP):  The patient was taught a homegoing physical therapy regimen as described  above. The patient demonstrated understanding of the exercises and proper technique of their execution. This interaction took 15 minutes.       3-7. Somatic dysfunction of lumbar, pelvis, thoracic, sacral, lower extremity regions -     - OMT 5-6 regions. Oral consent obtained. Reviewed benefits and potential side effects. OMT indicated today due to signs and symptoms as well as local and remote somatic dysfunction findings and their related neurokinetic, lymphatic, fascial and/or arteriovenous body connections. OMT techniques used: Soft Tissue, Myofascial Release, Muscle Energy, High Velocity Low Amplitude and Fascial Distortion Model. Treatment was tolerated well. Improvement noted in segmental mobility post-treatment in dysfunctional regions. There were no adverse events and no complications immediately following treatment. Advised plenty of water to help alleviate soreness.      Future planning includes -  Possibly more OMT if helpful and if indicated, modify HEP, physical therapy    All questions were answered to the best of my ability and all concerns were addressed at this time.    Follow up in 2-3 weeks for above, or sooner if needed.      This note is dictated using the M*Modal Fluency Direct word recognition program. There are word recognition mistakes that are occasionally missed on review.

## 2020-11-11 NOTE — LETTER
November 12, 2020      Anderson Camilo MD  [address]  [facility] [address]           [facility]  [address] [address]  Phone: [phone]  Fax: [phone]          Patient: Tobias Capellan   MR Number: [id number]   YOB: 1965   Date of Visit: 11/11/2020       Dear Dr. Anderson Camilo:    Thank you for referring Tobias Capellan to me for evaluation. Attached you will find relevant portions of my assessment and plan of care.    If you have questions, please do not hesitate to call me. I look forward to following Tobias Capellan along with you.    Sincerely,    Gigi Solitario, DO    Enclosure  CC:  No Recipients    If you would like to receive this communication electronically, please contact [email or web address] or [phone] to request more information on [employer] Link access.    For providers and/or their staff who would like to refer a patient to [facility], please contact us through our one-stop-shop provider referral line, [facility], at [phone].    If you feel you have received this communication in error or would no longer like to receive these types of communications, please e-mail [email or web address]

## 2020-11-17 ENCOUNTER — OFFICE VISIT (OUTPATIENT)
Dept: PAIN MEDICINE | Facility: CLINIC | Age: 55
End: 2020-11-17
Payer: COMMERCIAL

## 2020-11-17 VITALS
OXYGEN SATURATION: 100 % | SYSTOLIC BLOOD PRESSURE: 150 MMHG | TEMPERATURE: 98 F | WEIGHT: 191.81 LBS | HEART RATE: 90 BPM | HEIGHT: 73 IN | BODY MASS INDEX: 25.42 KG/M2 | DIASTOLIC BLOOD PRESSURE: 90 MMHG | RESPIRATION RATE: 18 BRPM

## 2020-11-17 DIAGNOSIS — M47.27 LUMBOSACRAL SPONDYLOSIS WITH RADICULOPATHY: ICD-10-CM

## 2020-11-17 DIAGNOSIS — G89.4 CHRONIC PAIN DISORDER: ICD-10-CM

## 2020-11-17 DIAGNOSIS — M54.16 LUMBAR RADICULOPATHY: Primary | ICD-10-CM

## 2020-11-17 DIAGNOSIS — M51.36 DDD (DEGENERATIVE DISC DISEASE), LUMBAR: ICD-10-CM

## 2020-11-17 PROCEDURE — 99999 PR PBB SHADOW E&M-EST. PATIENT-LVL V: CPT | Mod: PBBFAC,,, | Performed by: ANESTHESIOLOGY

## 2020-11-17 PROCEDURE — 3080F PR MOST RECENT DIASTOLIC BLOOD PRESSURE >= 90 MM HG: ICD-10-PCS | Mod: CPTII,S$GLB,, | Performed by: ANESTHESIOLOGY

## 2020-11-17 PROCEDURE — 3077F SYST BP >= 140 MM HG: CPT | Mod: CPTII,S$GLB,, | Performed by: ANESTHESIOLOGY

## 2020-11-17 PROCEDURE — 99204 PR OFFICE/OUTPT VISIT, NEW, LEVL IV, 45-59 MIN: ICD-10-PCS | Mod: S$GLB,,, | Performed by: ANESTHESIOLOGY

## 2020-11-17 PROCEDURE — 3080F DIAST BP >= 90 MM HG: CPT | Mod: CPTII,S$GLB,, | Performed by: ANESTHESIOLOGY

## 2020-11-17 PROCEDURE — 3077F PR MOST RECENT SYSTOLIC BLOOD PRESSURE >= 140 MM HG: ICD-10-PCS | Mod: CPTII,S$GLB,, | Performed by: ANESTHESIOLOGY

## 2020-11-17 PROCEDURE — 99999 PR PBB SHADOW E&M-EST. PATIENT-LVL V: ICD-10-PCS | Mod: PBBFAC,,, | Performed by: ANESTHESIOLOGY

## 2020-11-17 PROCEDURE — 99204 OFFICE O/P NEW MOD 45 MIN: CPT | Mod: S$GLB,,, | Performed by: ANESTHESIOLOGY

## 2020-11-17 RX ORDER — GABAPENTIN 100 MG/1
100 CAPSULE ORAL 3 TIMES DAILY
Qty: 90 CAPSULE | Refills: 2 | Status: SHIPPED | OUTPATIENT
Start: 2020-11-17 | End: 2021-02-02

## 2020-11-17 RX ORDER — CYCLOBENZAPRINE HCL 5 MG
5 TABLET ORAL NIGHTLY
Qty: 30 TABLET | Refills: 1 | Status: SHIPPED | OUTPATIENT
Start: 2020-11-17 | End: 2020-12-17

## 2020-11-17 NOTE — LETTER
November 17, 2020      Mando Rice MD  [address]  [facility] [address]           [facility]  [address]  [facility] [address]  Phone: [phone]  Fax: [phone]          Patient: Tobias Capellan   MR Number: [id number]   YOB: 1965   Date of Visit: 11/17/2020       Dear Dr. Mando Rice:    Thank you for referring Tobias Capellan to me for evaluation. Attached you will find relevant portions of my assessment and plan of care.    If you have questions, please do not hesitate to call me. I look forward to following Tobias Capellan along with you.    Sincerely,    Beka Mann MD    Enclosure  CC:  No Recipients    If you would like to receive this communication electronically, please contact [email or web address] or [phone] to request more information on [employer] Link access.    For providers and/or their staff who would like to refer a patient to [facility], please contact us through our one-stop-shop provider referral line, [facility], at [phone].    If you feel you have received this communication in error or would no longer like to receive these types of communications, please e-mail [email or web address]

## 2020-11-17 NOTE — PROGRESS NOTES
Chronic Pain - New Consult    Referring Physician: Mando Rice MD    Chief Complaint:   Chief Complaint   Patient presents with    Low-back Pain    Leg Pain        SUBJECTIVE:    Tobias Capellan is a 54 y/o male with a history of Glaucoma, HTN who presents to clinic for evaluation of pain in the left lower back and lower extremity.   First started to notice pain many years ago but it was mild. In August 2018, he fell off a ladder and hit his head with resulting loss of consciousness. Does not recall if he fell on his back. Since then, the pain has started acting up more. On 9/29/2020, he fell off a truck while attempting to get off it. Thinks his left leg 'gave away'.  Since then, the pain has been constant. Describes it as a aching/constricting pain that starts around the left lower back and involves the left anterior thigh, left shin. Feels like his left hip and thigh are 'tight'. Also has numbness of the left foot. On a good day, pain is 5-6/10 and on a bad day, it is 8/10. Pain is exacerbated on walking, stretching his leg. Relieved by laying down, leaning forward. The pain is mitigated by heat and ice. He reports spending 4 hours per day reclining. The patient reports 6 hours of uninterrupted sleep per night and is occasionally disturbed by the pain.    Denies any incontinence, gait instability. Feels his left leg is getting weaker.  MRI of the lumbar spine in Sept 2020 shows bilateral neural foraminal narrowing at L4/5 and R L5/S1 foraminal narrowing.   EMG/NCS was suggestive of a left L5/S1 radiculopathy.     Denies any alcohol, tobacco history. Denies any drug use.    Received Toradol IM x1 on 9/29 which provided minimal relief. Also received a short course of steroids which didn't helped.  Has tried Meloxicam and Etodolac in the past but neither have helped. Has also tried Robaxin in the past few weeks which also hasn't helped.     He tried OTC Theraflu which has helped for the pain.    Patient  denies none.    Physical Therapy/Home Exercise: no      Pain Disability Index Review:  Last 3 PDI Scores 11/17/2020   Pain Disability Index (PDI) 42       Pain Medications:    - Others: Etodolac     report:  Reviewed and consistent with medication use as prescribed.    Pain Procedures: none    Imaging:   Narrative & Impression     EXAMINATION:  MRI PELVIS WITHOUT CONTRAST     CLINICAL HISTORY:  Persistent left pelvic bone pain and perineal discomfort;  Pain in left hip     TECHNIQUE:  Axial, coronal imaging of the pelvis was performed.     COMPARISON:  MRI of the hip, 10/17/2020 at 13:26 hours     FINDINGS:  Pelvic bones appear intact without bone marrow edema or fracture.  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  There is a small focus of increased signal intensity in the superolateral labrum at the 12 o'clock position on T1 weighted sequences that is not confidently identified on T2 weighted sequences raising the question of either chondromalacia or small superior labral tear.     There is no effusion.  The hip rotators adductors and trochanteric muscles and tendons appear normal.  There is no significant bursitis.  The hamstring tendon insertions appear normal.  The sciatic nerve course appears on and pinched.  The visualized lumbosacral junction appears unremarkable.     The seminal vesicles are slightly congested.  The prostate gland is not enlarged.  The urinary bladder appears unremarkable.  The loops of large and small intestines appear unremarkable.  The vascular structures appear unremarkable.     Impression:     Mild bilateral osteoarthritis changes in both hips with no evidence of fracture or AVN.     Slight irregularity at the 12 o'clock position of the superior labrum in the left hip raising the question of small labral tear or chondromalacia at the chondrolabral junction.  Consider arthrography if clinically warranted.        Electronically signed by: Gigi Gutiérrez  Date:                                             10/18/2020  Time:                                           21:34        Narrative & Impression     EXAMINATION:  MRI HIP WITHOUT CONTRAST LEFT     CLINICAL HISTORY:  Chronic left hip pain, perineal discomfort;  Pain in left hip     TECHNIQUE:  Multiplanar imaging and multi weighted imaging of the left hip was performed.  The right hip was included in the coronal and axial image sequences.     COMPARISON:  Plain radiograph obtained on the 1st July 2020     FINDINGS:  The left hip does not appear to be injured in the coronal or axial plane on T2 fat-suppressed weighted imaging sequences.  Specifically there does not appear to be any obvious disruption of the cortices nor does there appear to be evidence of bone bruising or bone edema.  No evidence of a joint effusion.  No indication of signal abnormalities involving the adductor or abductor muscles.  The appearance of the left hip is symmetric with the appearance of the right.  There is a small amount of osteoarthritic changes involving the acetabular roof as evidence by sclerosis of the area seen symmetrically from right to left.     Impression:     No obvious evidence of an acute or chronic injury involving the left hip.     Mild osteoarthritic changes are noted with sclerotic articular surface of the acetabular roof.        Electronically signed by: Anderson Molina  Date:                                            10/18/2020  Time:                                           09:12     Narrative & Impression     EXAMINATION:  MRI LUMBAR SPINE WITHOUT CONTRAST     CLINICAL HISTORY:  Back pain or radiculopathy, > 6 wks; Dorsalgia, unspecified     TECHNIQUE:  Multiplanar, multisequence MR images were acquired from the thoracolumbar junction to the sacrum without the administration of contrast.     COMPARISON:  07/01/2020     FINDINGS:  Alignment: Trace anterolisthesis L4 on L5     Vertebrae: Normal marrow signal. No  fracture.     Discs: Normal height and signal.     Cord: Normal.  Conus terminates at L1.     Degenerative findings:     T12-L1: Unremarkable     L1-L2: Unremarkable     L2-L3: Small circumferential disc bulge.  No nerve root compression.     L3-L4: Small circumferential disc bulge.  No nerve root compression.     L4-L5: Slight uncovering of the disc with circumferential disc bulge and facet arthropathy.  Mild bilateral neural foraminal narrowing.  Mild canal stenosis.     L5-S1: Circumferential disc bulge, paracentral to the right and facet arthropathy.  Mild right neural foraminal narrowing and narrowing of the right lateral recess.     Paraspinal muscles & soft tissues: Unremarkable.     Impression:     Degenerative changes as detailed above, most notable L4-5 and L5-S1.        Electronically signed by: Johnson Cohn MD  Date:                                            10/01/2020  Time:                                           07:54     Narrative & Impression     EXAMINATION:  XR HIPS BILATERAL 2 VIEW INCL AP PELVIS     CLINICAL HISTORY:  Low back pain     TECHNIQUE:  AP view of the pelvis and frogleg lateral views of both hips were performed.     COMPARISON:  None.     FINDINGS:  Mild DJD.  No fracture or dislocation.  No bone destruction identified.     Impression:     See above        Electronically signed by: Jasbir Severino MD  Date:                                            07/01/2020  Time:                                           14:02     Narrative & Impression     EXAMINATION:  XR LUMBAR SPINE AP AND LATERAL     CLINICAL HISTORY:  Back pain or radiculopathy, > 6 wks;Dorsalgia, unspecified     TECHNIQUE:  AP, lateral and spot images were performed of the lumbar spine.     COMPARISON:  N 05/15/2018 one     FINDINGS:  Mild DJD.  There is a grade 1 L4/L5 anterolisthesis noted.  The lumbosacral disc space is narrowed and slight narrowing of the L4/L5 disc space also identified.  No fracture or  dislocation.  No bone destruction identified     Impression:     See above        Electronically signed by: Jasbir Severino MD  Date:                                            07/01/2020  Time:                                           13:59             Past Medical History:   Diagnosis Date    Glaucoma      Past Surgical History:   Procedure Laterality Date    EYE SURGERY      glaucoma     Social History     Socioeconomic History    Marital status: Single     Spouse name: Not on file    Number of children: Not on file    Years of education: Not on file    Highest education level: Not on file   Occupational History    Not on file   Social Needs    Financial resource strain: Not on file    Food insecurity     Worry: Not on file     Inability: Not on file    Transportation needs     Medical: Not on file     Non-medical: Not on file   Tobacco Use    Smoking status: Never Smoker    Smokeless tobacco: Never Used   Substance and Sexual Activity    Alcohol use: Yes     Comment: limited    Drug use: No    Sexual activity: Not on file   Lifestyle    Physical activity     Days per week: Not on file     Minutes per session: Not on file    Stress: Not on file   Relationships    Social connections     Talks on phone: Not on file     Gets together: Not on file     Attends Protestant service: Not on file     Active member of club or organization: Not on file     Attends meetings of clubs or organizations: Not on file     Relationship status: Not on file   Other Topics Concern    Not on file   Social History Narrative    Not on file     Family History   Problem Relation Age of Onset    Hypertension Mother     Hypertension Brother     Kidney disease Brother     Heart disease Brother     Cancer Brother         lung    Hypertension Brother        Review of patient's allergies indicates:  Not on File    Current Outpatient Medications   Medication Sig    brimonidine 0.2% (ALPHAGAN) 0.2 % Drop Apply 1 drop to eye.  "   brimonidine 0.2% (ALPHAGAN) 0.2 % Drop INSTILL 1 DROP INTO EACH EYE THREE TIMES DAILY    BRIMONIDINE TARTRATE/TIMOLOL (COMBIGAN OPHT) Apply to eye.    dorzolamide-timolol 2-0.5% (COSOPT) 22.3-6.8 mg/mL ophthalmic solution Apply 1 drop to eye.    dorzolamide-timolol 2-0.5% (COSOPT) 22.3-6.8 mg/mL ophthalmic solution INSTILL 1 DROP INTO EACH EYE TWICE DAILY    LATANOPROST (XALATAN OPHT) Apply to eye.    amLODIPine (NORVASC) 5 MG tablet     dextran 70-hypromellose (TEARS) ophthalmic solution Place 1 drop into both eyes as needed. (Patient not taking: Reported on 10/30/2020)    moxifloxacin (VIGAMOX) 0.5 % ophthalmic solution Place 1 drop into the right eye 3 (three) times daily. (Patient not taking: Reported on 10/21/2020)     No current facility-administered medications for this visit.        REVIEW OF SYSTEMS:    GENERAL:  No weight loss, malaise or fevers.  HEENT:  Negative for frequent or significant headaches. Decreased vision (stable)  NECK:  Negative for lumps, goiter, pain and significant neck swelling.  RESPIRATORY:  Negative for cough, wheezing or shortness of breath.  CARDIOVASCULAR:  Negative for chest pain, leg swelling or palpitations.  GI:  Negative for abdominal discomfort, blood in stools or black stools or change in bowel habits.  MUSCULOSKELETAL:  See HPI.  SKIN:  Negative for lesions, rash, and itching.  PSYCH:  Negative for sleep disturbance, mood disorder and recent psychosocial stressors.  HEMATOLOGY/LYMPHOLOGY:  Negative for prolonged bleeding, bruising easily or swollen nodes.  NEURO:   No history of headaches, syncope, paralysis, seizures or tremors.  All other reviewed and negative other than HPI.    OBJECTIVE:    Resp 18   Ht 6' 1" (1.854 m)   Wt 87 kg (191 lb 12.8 oz)   SpO2 100%   BMI 25.30 kg/m²     PHYSICAL EXAMINATION:    General appearance: Well appearing, in no acute distress, alert and oriented x3.  Psych:  Mood and affect appropriate.  Skin: Skin color, texture, " turgor normal, no rashes or lesions, in both upper and lower body.  Head/face:  Normocephalic, atraumatic. No palpable lymph nodes.  Neck: No pain to palpation over the cervical paraspinous muscles. Spurling Negative. No pain with neck flexion, extension, or lateral flexion.   Cor: RRR  Pulm: CTA  GI:  Soft and non-tender.  Back: Straight leg raising in the sitting and supine positions is negative to radicular pain. No pain to palpation over the spine or costovertebral angles. Normal range of motion without pain reproduction.  Extremities: Peripheral joint ROM is full and pain free without obvious instability or laxity in all four extremities. No deformities, edema, or skin discoloration. Good capillary refill.  Musculoskeletal: Shoulder, hip, sacroiliac and knee provocative maneuvers are negative. Bilateral upper and lower extremity strength is normal and symmetric.  No atrophy or tone abnormalities are noted.  Neuro: Diminished reflexes in the lower extremities.  Plantar response are downgoing. Sensory loss over the left anterior and medial aspect of the leg and medial and lateral aspect of the foot.  Gait: normal. Difficulty walking on heels.    ASSESSMENT: 55 y.o. year old male with left lower extremity pain consistent with lumbo sacral radiculopathy predominantly in the L5 and S1 distribution. MRI of the lumbar spine shows mild bilateral neural foraminal narrowing at L4/% and mild R foraminal narrowing at L5/S1. EMG/NCS shows evidence of and left L5/S1 radiculopathy. He has tried multiple OTC medications with no significant relief.     1. Lumbar radiculopathy  Ambulatory referral/consult to Physical/Occupational Therapy   2. Lumbosacral spondylosis with radiculopathy  Ambulatory referral/consult to Pain Clinic   3. Chronic pain disorder     4. DDD (degenerative disc disease), lumbar         PLAN:     - I have stressed the importance of physical activity and a home exercise plan to help with pain and improve  health.  - Start Neurontin 100mg daily and gradually increase to three times a day to help with radicular pain.  - Start Flexeril 5mg daily.  - Schedule for a Left  Lumbar Epidural Transforaminal Steroid Injection at L4/5 and L5/S1 to help with pain and progress with a Home exercise program.  - RTC 2 weeks.  - Counseled patient regarding the importance of physical therapy.    The above plan and management options were discussed at length with patient. Patient is in agreement with the above and verbalized understanding. It will be communicated with the referring physician via electronic record, fax, or mail.    Christopher Dillard  11/17/2020     I have reviewed and concur with the resident's history, physical, assessment, and plan.  I have personally interviewed and examined the patient at bedside.  See below addendum for my evaluation and additional findings.  55-year-old male with chronic lower back pain radiation down to left lower extremity consistent with lumbar radiculopathy.  Will start him on gabapentin and Flexeril.  We will schedule for lumbar transforaminal epidural steroid injection at left side at L4/L5 and L5/S1 levels.  Will follow up with him 2 weeks after the procedure.    Beka Mann MD

## 2020-12-08 ENCOUNTER — DOCUMENTATION ONLY (OUTPATIENT)
Dept: REHABILITATION | Facility: OTHER | Age: 55
End: 2020-12-08

## 2020-12-08 NOTE — PROGRESS NOTES
Pt failed to appear for physical therapy evaluation without notification today.      Jonna Adamson, PT, DPT

## 2020-12-24 ENCOUNTER — TELEPHONE (OUTPATIENT)
Dept: PAIN MEDICINE | Facility: CLINIC | Age: 55
End: 2020-12-24

## 2020-12-24 NOTE — TELEPHONE ENCOUNTER
Staff spoke with patient in regards to appointment 12/28/20 with elise herrera np due to patient not having procedure follow up has been cancelled.patient verbalized understanding.

## 2021-02-01 ENCOUNTER — PATIENT OUTREACH (OUTPATIENT)
Dept: ADMINISTRATIVE | Facility: HOSPITAL | Age: 56
End: 2021-02-01

## 2021-02-02 ENCOUNTER — OFFICE VISIT (OUTPATIENT)
Dept: INTERNAL MEDICINE | Facility: CLINIC | Age: 56
End: 2021-02-02
Payer: COMMERCIAL

## 2021-02-02 VITALS
OXYGEN SATURATION: 99 % | HEART RATE: 78 BPM | DIASTOLIC BLOOD PRESSURE: 82 MMHG | SYSTOLIC BLOOD PRESSURE: 140 MMHG | BODY MASS INDEX: 25.31 KG/M2 | HEIGHT: 73 IN | WEIGHT: 191 LBS

## 2021-02-02 DIAGNOSIS — M47.816 LUMBAR FACET ARTHROPATHY: Primary | ICD-10-CM

## 2021-02-02 DIAGNOSIS — M79.18 MYOFASCIAL PAIN SYNDROME OF LUMBAR SPINE: ICD-10-CM

## 2021-02-02 DIAGNOSIS — I10 ESSENTIAL HYPERTENSION: ICD-10-CM

## 2021-02-02 PROCEDURE — 3077F PR MOST RECENT SYSTOLIC BLOOD PRESSURE >= 140 MM HG: ICD-10-PCS | Mod: CPTII,S$GLB,, | Performed by: INTERNAL MEDICINE

## 2021-02-02 PROCEDURE — 3079F PR MOST RECENT DIASTOLIC BLOOD PRESSURE 80-89 MM HG: ICD-10-PCS | Mod: CPTII,S$GLB,, | Performed by: INTERNAL MEDICINE

## 2021-02-02 PROCEDURE — 1126F AMNT PAIN NOTED NONE PRSNT: CPT | Mod: S$GLB,,, | Performed by: INTERNAL MEDICINE

## 2021-02-02 PROCEDURE — 1126F PR PAIN SEVERITY QUANTIFIED, NO PAIN PRESENT: ICD-10-PCS | Mod: S$GLB,,, | Performed by: INTERNAL MEDICINE

## 2021-02-02 PROCEDURE — 3079F DIAST BP 80-89 MM HG: CPT | Mod: CPTII,S$GLB,, | Performed by: INTERNAL MEDICINE

## 2021-02-02 PROCEDURE — 99999 PR PBB SHADOW E&M-EST. PATIENT-LVL III: ICD-10-PCS | Mod: PBBFAC,,, | Performed by: INTERNAL MEDICINE

## 2021-02-02 PROCEDURE — 96372 PR INJECTION,THERAP/PROPH/DIAG2ST, IM OR SUBCUT: ICD-10-PCS | Mod: S$GLB,,, | Performed by: INTERNAL MEDICINE

## 2021-02-02 PROCEDURE — 99214 OFFICE O/P EST MOD 30 MIN: CPT | Mod: 25,S$GLB,, | Performed by: INTERNAL MEDICINE

## 2021-02-02 PROCEDURE — 3008F BODY MASS INDEX DOCD: CPT | Mod: CPTII,S$GLB,, | Performed by: INTERNAL MEDICINE

## 2021-02-02 PROCEDURE — 99999 PR PBB SHADOW E&M-EST. PATIENT-LVL III: CPT | Mod: PBBFAC,,, | Performed by: INTERNAL MEDICINE

## 2021-02-02 PROCEDURE — 99214 PR OFFICE/OUTPT VISIT, EST, LEVL IV, 30-39 MIN: ICD-10-PCS | Mod: 25,S$GLB,, | Performed by: INTERNAL MEDICINE

## 2021-02-02 PROCEDURE — 96372 THER/PROPH/DIAG INJ SC/IM: CPT | Mod: S$GLB,,, | Performed by: INTERNAL MEDICINE

## 2021-02-02 PROCEDURE — 3077F SYST BP >= 140 MM HG: CPT | Mod: CPTII,S$GLB,, | Performed by: INTERNAL MEDICINE

## 2021-02-02 PROCEDURE — 3008F PR BODY MASS INDEX (BMI) DOCUMENTED: ICD-10-PCS | Mod: CPTII,S$GLB,, | Performed by: INTERNAL MEDICINE

## 2021-02-02 RX ORDER — IBUPROFEN 800 MG/1
800 TABLET ORAL 3 TIMES DAILY
Qty: 30 TABLET | Refills: 0 | Status: SHIPPED | OUTPATIENT
Start: 2021-02-02 | End: 2021-02-12

## 2021-02-02 RX ORDER — TIZANIDINE 4 MG/1
4 TABLET ORAL EVERY 8 HOURS
Qty: 30 TABLET | Refills: 0 | Status: SHIPPED | OUTPATIENT
Start: 2021-02-02 | End: 2021-02-12

## 2021-02-02 RX ORDER — KETOROLAC TROMETHAMINE 30 MG/ML
60 INJECTION, SOLUTION INTRAMUSCULAR; INTRAVENOUS
Status: COMPLETED | OUTPATIENT
Start: 2021-02-02 | End: 2021-02-02

## 2021-02-02 RX ADMIN — KETOROLAC TROMETHAMINE 60 MG: 30 INJECTION, SOLUTION INTRAMUSCULAR; INTRAVENOUS at 12:02

## 2021-03-08 ENCOUNTER — PATIENT MESSAGE (OUTPATIENT)
Dept: ADMINISTRATIVE | Facility: HOSPITAL | Age: 56
End: 2021-03-08

## 2021-03-19 ENCOUNTER — OFFICE VISIT (OUTPATIENT)
Dept: INTERNAL MEDICINE | Facility: CLINIC | Age: 56
End: 2021-03-19
Payer: COMMERCIAL

## 2021-03-19 VITALS
HEIGHT: 73 IN | SYSTOLIC BLOOD PRESSURE: 160 MMHG | WEIGHT: 191.81 LBS | DIASTOLIC BLOOD PRESSURE: 100 MMHG | HEART RATE: 63 BPM | BODY MASS INDEX: 25.42 KG/M2 | OXYGEN SATURATION: 99 %

## 2021-03-19 DIAGNOSIS — M79.18 RIGHT BUTTOCK PAIN: Primary | ICD-10-CM

## 2021-03-19 DIAGNOSIS — I10 ESSENTIAL HYPERTENSION: ICD-10-CM

## 2021-03-19 DIAGNOSIS — G89.29 CHRONIC RIGHT-SIDED LOW BACK PAIN WITH RIGHT-SIDED SCIATICA: ICD-10-CM

## 2021-03-19 DIAGNOSIS — M54.41 CHRONIC RIGHT-SIDED LOW BACK PAIN WITH RIGHT-SIDED SCIATICA: ICD-10-CM

## 2021-03-19 PROCEDURE — 3077F PR MOST RECENT SYSTOLIC BLOOD PRESSURE >= 140 MM HG: ICD-10-PCS | Mod: CPTII,S$GLB,, | Performed by: INTERNAL MEDICINE

## 2021-03-19 PROCEDURE — 3080F DIAST BP >= 90 MM HG: CPT | Mod: CPTII,S$GLB,, | Performed by: INTERNAL MEDICINE

## 2021-03-19 PROCEDURE — 3077F SYST BP >= 140 MM HG: CPT | Mod: CPTII,S$GLB,, | Performed by: INTERNAL MEDICINE

## 2021-03-19 PROCEDURE — 1125F PR PAIN SEVERITY QUANTIFIED, PAIN PRESENT: ICD-10-PCS | Mod: S$GLB,,, | Performed by: INTERNAL MEDICINE

## 2021-03-19 PROCEDURE — 1125F AMNT PAIN NOTED PAIN PRSNT: CPT | Mod: S$GLB,,, | Performed by: INTERNAL MEDICINE

## 2021-03-19 PROCEDURE — 3008F BODY MASS INDEX DOCD: CPT | Mod: CPTII,S$GLB,, | Performed by: INTERNAL MEDICINE

## 2021-03-19 PROCEDURE — 3080F PR MOST RECENT DIASTOLIC BLOOD PRESSURE >= 90 MM HG: ICD-10-PCS | Mod: CPTII,S$GLB,, | Performed by: INTERNAL MEDICINE

## 2021-03-19 PROCEDURE — 3008F PR BODY MASS INDEX (BMI) DOCUMENTED: ICD-10-PCS | Mod: CPTII,S$GLB,, | Performed by: INTERNAL MEDICINE

## 2021-03-19 PROCEDURE — 99214 OFFICE O/P EST MOD 30 MIN: CPT | Mod: S$GLB,,, | Performed by: INTERNAL MEDICINE

## 2021-03-19 PROCEDURE — 99214 PR OFFICE/OUTPT VISIT, EST, LEVL IV, 30-39 MIN: ICD-10-PCS | Mod: S$GLB,,, | Performed by: INTERNAL MEDICINE

## 2021-03-19 PROCEDURE — 99999 PR PBB SHADOW E&M-EST. PATIENT-LVL IV: CPT | Mod: PBBFAC,,, | Performed by: INTERNAL MEDICINE

## 2021-03-19 PROCEDURE — 99999 PR PBB SHADOW E&M-EST. PATIENT-LVL IV: ICD-10-PCS | Mod: PBBFAC,,, | Performed by: INTERNAL MEDICINE

## 2021-03-19 RX ORDER — AMLODIPINE BESYLATE 5 MG/1
5 TABLET ORAL DAILY
Qty: 90 TABLET | Refills: 3 | Status: SHIPPED | OUTPATIENT
Start: 2021-03-19 | End: 2021-10-05

## 2021-04-06 ENCOUNTER — CLINICAL SUPPORT (OUTPATIENT)
Dept: REHABILITATION | Facility: OTHER | Age: 56
End: 2021-04-06
Attending: INTERNAL MEDICINE
Payer: COMMERCIAL

## 2021-04-06 DIAGNOSIS — G89.29 CHRONIC RIGHT-SIDED LOW BACK PAIN WITH RIGHT-SIDED SCIATICA: ICD-10-CM

## 2021-04-06 DIAGNOSIS — M54.41 CHRONIC RIGHT-SIDED LOW BACK PAIN WITH RIGHT-SIDED SCIATICA: ICD-10-CM

## 2021-04-06 DIAGNOSIS — R29.898 DECREASED STRENGTH OF TRUNK AND BACK: ICD-10-CM

## 2021-04-06 DIAGNOSIS — M79.18 RIGHT BUTTOCK PAIN: ICD-10-CM

## 2021-04-06 DIAGNOSIS — M53.86 DECREASED RANGE OF MOTION OF LUMBAR SPINE: ICD-10-CM

## 2021-04-06 PROCEDURE — 97110 THERAPEUTIC EXERCISES: CPT

## 2021-04-06 PROCEDURE — 97161 PT EVAL LOW COMPLEX 20 MIN: CPT

## 2021-04-12 ENCOUNTER — DOCUMENTATION ONLY (OUTPATIENT)
Dept: REHABILITATION | Facility: OTHER | Age: 56
End: 2021-04-12

## 2021-04-14 ENCOUNTER — CLINICAL SUPPORT (OUTPATIENT)
Dept: REHABILITATION | Facility: OTHER | Age: 56
End: 2021-04-14
Attending: INTERNAL MEDICINE
Payer: COMMERCIAL

## 2021-04-14 DIAGNOSIS — R29.898 DECREASED STRENGTH OF TRUNK AND BACK: ICD-10-CM

## 2021-04-14 DIAGNOSIS — M53.86 DECREASED RANGE OF MOTION OF LUMBAR SPINE: Primary | ICD-10-CM

## 2021-04-14 PROCEDURE — 97110 THERAPEUTIC EXERCISES: CPT | Mod: CQ

## 2021-04-19 ENCOUNTER — DOCUMENTATION ONLY (OUTPATIENT)
Dept: REHABILITATION | Facility: OTHER | Age: 56
End: 2021-04-19

## 2021-04-21 ENCOUNTER — CLINICAL SUPPORT (OUTPATIENT)
Dept: REHABILITATION | Facility: OTHER | Age: 56
End: 2021-04-21
Attending: INTERNAL MEDICINE
Payer: COMMERCIAL

## 2021-04-21 DIAGNOSIS — M53.86 DECREASED RANGE OF MOTION OF LUMBAR SPINE: Primary | ICD-10-CM

## 2021-04-21 DIAGNOSIS — R29.898 DECREASED STRENGTH OF TRUNK AND BACK: ICD-10-CM

## 2021-04-21 PROCEDURE — 97110 THERAPEUTIC EXERCISES: CPT | Mod: CQ

## 2021-04-27 ENCOUNTER — CLINICAL SUPPORT (OUTPATIENT)
Dept: REHABILITATION | Facility: OTHER | Age: 56
End: 2021-04-27
Attending: INTERNAL MEDICINE
Payer: COMMERCIAL

## 2021-04-27 DIAGNOSIS — M53.86 DECREASED RANGE OF MOTION OF LUMBAR SPINE: Primary | ICD-10-CM

## 2021-04-27 DIAGNOSIS — R29.898 DECREASED STRENGTH OF TRUNK AND BACK: ICD-10-CM

## 2021-04-27 PROCEDURE — 97110 THERAPEUTIC EXERCISES: CPT | Mod: CQ

## 2021-05-04 ENCOUNTER — CLINICAL SUPPORT (OUTPATIENT)
Dept: REHABILITATION | Facility: OTHER | Age: 56
End: 2021-05-04
Attending: INTERNAL MEDICINE
Payer: COMMERCIAL

## 2021-05-04 DIAGNOSIS — M53.86 DECREASED RANGE OF MOTION OF LUMBAR SPINE: Primary | ICD-10-CM

## 2021-05-04 DIAGNOSIS — R29.898 DECREASED STRENGTH OF TRUNK AND BACK: ICD-10-CM

## 2021-05-04 PROCEDURE — 97110 THERAPEUTIC EXERCISES: CPT

## 2021-05-06 ENCOUNTER — CLINICAL SUPPORT (OUTPATIENT)
Dept: REHABILITATION | Facility: OTHER | Age: 56
End: 2021-05-06
Attending: INTERNAL MEDICINE
Payer: COMMERCIAL

## 2021-05-06 DIAGNOSIS — R29.898 DECREASED STRENGTH OF TRUNK AND BACK: ICD-10-CM

## 2021-05-06 DIAGNOSIS — M53.86 DECREASED RANGE OF MOTION OF LUMBAR SPINE: Primary | ICD-10-CM

## 2021-05-06 PROCEDURE — 97110 THERAPEUTIC EXERCISES: CPT | Mod: CQ

## 2021-05-13 ENCOUNTER — PATIENT OUTREACH (OUTPATIENT)
Dept: ADMINISTRATIVE | Facility: HOSPITAL | Age: 56
End: 2021-05-13

## 2021-05-13 ENCOUNTER — PATIENT MESSAGE (OUTPATIENT)
Dept: ADMINISTRATIVE | Facility: HOSPITAL | Age: 56
End: 2021-05-13

## 2021-05-13 DIAGNOSIS — Z12.11 COLON CANCER SCREENING: Primary | ICD-10-CM

## 2021-05-20 ENCOUNTER — DOCUMENTATION ONLY (OUTPATIENT)
Dept: REHABILITATION | Facility: OTHER | Age: 56
End: 2021-05-20

## 2021-06-01 ENCOUNTER — DOCUMENTATION ONLY (OUTPATIENT)
Dept: REHABILITATION | Facility: OTHER | Age: 56
End: 2021-06-01

## 2021-06-04 ENCOUNTER — HOSPITAL ENCOUNTER (EMERGENCY)
Facility: HOSPITAL | Age: 56
Discharge: HOME | End: 2021-06-05
Attending: EMERGENCY MEDICINE
Payer: COMMERCIAL

## 2021-06-04 DIAGNOSIS — S05.02XA ABRASION OF LEFT CORNEA, INITIAL ENCOUNTER: Primary | ICD-10-CM

## 2021-06-04 DIAGNOSIS — I10 POORLY-CONTROLLED HYPERTENSION: ICD-10-CM

## 2021-06-04 DIAGNOSIS — Z91.148 NON COMPLIANCE W MEDICATION REGIMEN: ICD-10-CM

## 2021-06-04 PROCEDURE — 25000003 PHARM REV CODE 250: Performed by: EMERGENCY MEDICINE

## 2021-06-04 PROCEDURE — 99283 EMERGENCY DEPT VISIT LOW MDM: CPT

## 2021-06-04 RX ORDER — PROPARACAINE HYDROCHLORIDE 5 MG/ML
1 SOLUTION/ DROPS OPHTHALMIC
Status: COMPLETED | OUTPATIENT
Start: 2021-06-04 | End: 2021-06-04

## 2021-06-04 RX ADMIN — PROPARACAINE HYDROCHLORIDE 1 DROP: 5 SOLUTION/ DROPS OPHTHALMIC at 11:06

## 2021-06-04 RX ADMIN — FLUORESCEIN SODIUM 1 EACH: 1 STRIP OPHTHALMIC at 11:06

## 2021-06-05 VITALS
TEMPERATURE: 98 F | OXYGEN SATURATION: 99 % | BODY MASS INDEX: 25.2 KG/M2 | RESPIRATION RATE: 16 BRPM | HEART RATE: 62 BPM | SYSTOLIC BLOOD PRESSURE: 175 MMHG | WEIGHT: 191 LBS | DIASTOLIC BLOOD PRESSURE: 92 MMHG

## 2021-06-05 PROCEDURE — 25000003 PHARM REV CODE 250: Performed by: EMERGENCY MEDICINE

## 2021-06-05 RX ORDER — ERYTHROMYCIN 5 MG/G
OINTMENT OPHTHALMIC
Qty: 1 TUBE | Refills: 0 | Status: SHIPPED | OUTPATIENT
Start: 2021-06-05

## 2021-06-05 RX ORDER — AMLODIPINE BESYLATE 5 MG/1
5 TABLET ORAL
Status: COMPLETED | OUTPATIENT
Start: 2021-06-05 | End: 2021-06-05

## 2021-06-05 RX ADMIN — AMLODIPINE BESYLATE 5 MG: 5 TABLET ORAL at 12:06

## 2021-07-06 ENCOUNTER — PATIENT MESSAGE (OUTPATIENT)
Dept: ADMINISTRATIVE | Facility: HOSPITAL | Age: 56
End: 2021-07-06

## 2021-10-05 ENCOUNTER — OFFICE VISIT (OUTPATIENT)
Dept: INTERNAL MEDICINE | Facility: CLINIC | Age: 56
End: 2021-10-05
Payer: COMMERCIAL

## 2021-10-05 ENCOUNTER — PATIENT MESSAGE (OUTPATIENT)
Dept: ADMINISTRATIVE | Facility: HOSPITAL | Age: 56
End: 2021-10-05

## 2021-10-05 ENCOUNTER — CLINICAL SUPPORT (OUTPATIENT)
Dept: URGENT CARE | Facility: CLINIC | Age: 56
End: 2021-10-05
Payer: COMMERCIAL

## 2021-10-05 VITALS
WEIGHT: 185 LBS | HEART RATE: 90 BPM | SYSTOLIC BLOOD PRESSURE: 140 MMHG | HEIGHT: 73 IN | DIASTOLIC BLOOD PRESSURE: 84 MMHG | OXYGEN SATURATION: 98 % | BODY MASS INDEX: 24.52 KG/M2

## 2021-10-05 DIAGNOSIS — Z11.9 SCREENING EXAMINATION FOR UNSPECIFIED INFECTIOUS DISEASE: Primary | ICD-10-CM

## 2021-10-05 DIAGNOSIS — M47.816 LUMBAR SPONDYLOSIS: Primary | ICD-10-CM

## 2021-10-05 DIAGNOSIS — I10 ESSENTIAL HYPERTENSION: ICD-10-CM

## 2021-10-05 LAB
CTP QC/QA: YES
SARS-COV-2 RDRP RESP QL NAA+PROBE: NEGATIVE

## 2021-10-05 PROCEDURE — 3079F DIAST BP 80-89 MM HG: CPT | Mod: CPTII,S$GLB,, | Performed by: INTERNAL MEDICINE

## 2021-10-05 PROCEDURE — 1159F MED LIST DOCD IN RCRD: CPT | Mod: CPTII,S$GLB,, | Performed by: INTERNAL MEDICINE

## 2021-10-05 PROCEDURE — 99214 PR OFFICE/OUTPT VISIT, EST, LEVL IV, 30-39 MIN: ICD-10-PCS | Mod: 25,S$GLB,, | Performed by: INTERNAL MEDICINE

## 2021-10-05 PROCEDURE — U0002 COVID-19 LAB TEST NON-CDC: HCPCS | Mod: QW,S$GLB,, | Performed by: STUDENT IN AN ORGANIZED HEALTH CARE EDUCATION/TRAINING PROGRAM

## 2021-10-05 PROCEDURE — 99999 PR PBB SHADOW E&M-EST. PATIENT-LVL III: ICD-10-PCS | Mod: PBBFAC,,, | Performed by: INTERNAL MEDICINE

## 2021-10-05 PROCEDURE — 96372 THER/PROPH/DIAG INJ SC/IM: CPT | Mod: S$GLB,,, | Performed by: INTERNAL MEDICINE

## 2021-10-05 PROCEDURE — U0002: ICD-10-PCS | Mod: QW,S$GLB,, | Performed by: STUDENT IN AN ORGANIZED HEALTH CARE EDUCATION/TRAINING PROGRAM

## 2021-10-05 PROCEDURE — 1159F PR MEDICATION LIST DOCUMENTED IN MEDICAL RECORD: ICD-10-PCS | Mod: CPTII,S$GLB,, | Performed by: INTERNAL MEDICINE

## 2021-10-05 PROCEDURE — 99211 PR OFFICE/OUTPT VISIT, EST, LEVL I: ICD-10-PCS | Mod: S$GLB,CS,, | Performed by: STUDENT IN AN ORGANIZED HEALTH CARE EDUCATION/TRAINING PROGRAM

## 2021-10-05 PROCEDURE — 99214 OFFICE O/P EST MOD 30 MIN: CPT | Mod: 25,S$GLB,, | Performed by: INTERNAL MEDICINE

## 2021-10-05 PROCEDURE — 3077F SYST BP >= 140 MM HG: CPT | Mod: CPTII,S$GLB,, | Performed by: INTERNAL MEDICINE

## 2021-10-05 PROCEDURE — 99999 PR PBB SHADOW E&M-EST. PATIENT-LVL III: CPT | Mod: PBBFAC,,, | Performed by: INTERNAL MEDICINE

## 2021-10-05 PROCEDURE — 96372 PR INJECTION,THERAP/PROPH/DIAG2ST, IM OR SUBCUT: ICD-10-PCS | Mod: S$GLB,,, | Performed by: INTERNAL MEDICINE

## 2021-10-05 PROCEDURE — 3079F PR MOST RECENT DIASTOLIC BLOOD PRESSURE 80-89 MM HG: ICD-10-PCS | Mod: CPTII,S$GLB,, | Performed by: INTERNAL MEDICINE

## 2021-10-05 PROCEDURE — 3008F PR BODY MASS INDEX (BMI) DOCUMENTED: ICD-10-PCS | Mod: CPTII,S$GLB,, | Performed by: INTERNAL MEDICINE

## 2021-10-05 PROCEDURE — 99211 OFF/OP EST MAY X REQ PHY/QHP: CPT | Mod: S$GLB,CS,, | Performed by: STUDENT IN AN ORGANIZED HEALTH CARE EDUCATION/TRAINING PROGRAM

## 2021-10-05 PROCEDURE — 3077F PR MOST RECENT SYSTOLIC BLOOD PRESSURE >= 140 MM HG: ICD-10-PCS | Mod: CPTII,S$GLB,, | Performed by: INTERNAL MEDICINE

## 2021-10-05 PROCEDURE — 3008F BODY MASS INDEX DOCD: CPT | Mod: CPTII,S$GLB,, | Performed by: INTERNAL MEDICINE

## 2021-10-05 RX ORDER — DORZOLAMIDE HYDROCHLORIDE AND TIMOLOL MALEATE 20; 5 MG/ML; MG/ML
1 SOLUTION/ DROPS OPHTHALMIC 2 TIMES DAILY
COMMUNITY
Start: 2021-06-15

## 2021-10-05 RX ORDER — LATANOPROST 50 UG/ML
1 SOLUTION/ DROPS OPHTHALMIC NIGHTLY
COMMUNITY
Start: 2021-06-15

## 2021-10-05 RX ORDER — DICLOFENAC SODIUM 75 MG/1
75 TABLET, DELAYED RELEASE ORAL 2 TIMES DAILY
Qty: 14 TABLET | Refills: 0 | Status: SHIPPED | OUTPATIENT
Start: 2021-10-05 | End: 2021-10-09

## 2021-10-05 RX ORDER — PREDNISONE 20 MG/1
TABLET ORAL
Qty: 12 TABLET | Refills: 0 | Status: SHIPPED | OUTPATIENT
Start: 2021-10-05 | End: 2022-06-01

## 2021-10-05 RX ORDER — METHOCARBAMOL 500 MG/1
500 TABLET, FILM COATED ORAL 3 TIMES DAILY
Qty: 30 TABLET | Refills: 0 | Status: SHIPPED | OUTPATIENT
Start: 2021-10-05 | End: 2021-10-15

## 2021-10-05 RX ORDER — KETOROLAC TROMETHAMINE 30 MG/ML
60 INJECTION, SOLUTION INTRAMUSCULAR; INTRAVENOUS
Status: COMPLETED | OUTPATIENT
Start: 2021-10-05 | End: 2021-10-05

## 2021-10-05 RX ADMIN — KETOROLAC TROMETHAMINE 60 MG: 30 INJECTION, SOLUTION INTRAMUSCULAR; INTRAVENOUS at 10:10

## 2022-03-18 ENCOUNTER — PATIENT MESSAGE (OUTPATIENT)
Dept: ADMINISTRATIVE | Facility: HOSPITAL | Age: 57
End: 2022-03-18
Payer: COMMERCIAL

## 2022-05-31 NOTE — PROGRESS NOTES
MEDICAL HISTORY  Hypertension  Glaucoma  Lumbar spondylosis with lumbar radiculopathy  Osteoarthritis hip     Social history  Tobacco use none  Alcohol use limited  Works as        57-year-old male  For about a month he has been having lower lumbar pain.  Actually he does not reported as low back pain but right at the upper buttocks.  More on the right side but can go to the left.  It happens more when moving but trying to set up.  There is no symptoms involving the lower extremities.  No abdominal pain.  No differences for his bowel urine function.  He has a history of lumbar spondylosis.  He reports that in late March he fell off a ladder.  He saw Dr. Lee at this placing appointment in the medication Lodine and Zanaflex was prescribed.  Periodically he will take a half of a Lodine    Examination news  Vital signs per epic  Abdominal exam bowel sounds soft nontender  2+ femoral pedal pulses  2+ knee and ankle jerk reflex  Negative straight leg raise  No pain or abduct leg at the hip joint but when a dual with the left leg he will feel right lower back pain.  When he sits up he is having right lower lumbar pelvic    Impression  Chronic lumbar pain  Chronic sacroiliac pain    Plan radiographs lumbar spine and sacroiliac

## 2022-06-01 ENCOUNTER — HOSPITAL ENCOUNTER (OUTPATIENT)
Dept: RADIOLOGY | Facility: HOSPITAL | Age: 57
Discharge: HOME | End: 2022-06-01
Attending: INTERNAL MEDICINE
Payer: COMMERCIAL

## 2022-06-01 ENCOUNTER — OFFICE VISIT (OUTPATIENT)
Dept: INTERNAL MEDICINE | Facility: CLINIC | Age: 57
End: 2022-06-01
Payer: COMMERCIAL

## 2022-06-01 VITALS
WEIGHT: 187.38 LBS | SYSTOLIC BLOOD PRESSURE: 140 MMHG | DIASTOLIC BLOOD PRESSURE: 86 MMHG | HEIGHT: 73 IN | BODY MASS INDEX: 24.83 KG/M2 | OXYGEN SATURATION: 98 % | HEART RATE: 89 BPM

## 2022-06-01 DIAGNOSIS — M54.50 CHRONIC BILATERAL LOW BACK PAIN WITHOUT SCIATICA: ICD-10-CM

## 2022-06-01 DIAGNOSIS — M53.3 CHRONIC RIGHT SACROILIAC JOINT PAIN: ICD-10-CM

## 2022-06-01 DIAGNOSIS — G89.29 CHRONIC BILATERAL LOW BACK PAIN WITHOUT SCIATICA: ICD-10-CM

## 2022-06-01 DIAGNOSIS — G89.29 CHRONIC RIGHT SACROILIAC JOINT PAIN: ICD-10-CM

## 2022-06-01 DIAGNOSIS — Z12.5 PROSTATE CANCER SCREENING: Primary | ICD-10-CM

## 2022-06-01 PROCEDURE — 3077F SYST BP >= 140 MM HG: CPT | Mod: CPTII,S$GLB,, | Performed by: INTERNAL MEDICINE

## 2022-06-01 PROCEDURE — 1159F PR MEDICATION LIST DOCUMENTED IN MEDICAL RECORD: ICD-10-PCS | Mod: CPTII,S$GLB,, | Performed by: INTERNAL MEDICINE

## 2022-06-01 PROCEDURE — 1160F PR REVIEW ALL MEDS BY PRESCRIBER/CLIN PHARMACIST DOCUMENTED: ICD-10-PCS | Mod: CPTII,S$GLB,, | Performed by: INTERNAL MEDICINE

## 2022-06-01 PROCEDURE — 72202 X-RAY EXAM SI JOINTS 3/> VWS: CPT | Mod: TC

## 2022-06-01 PROCEDURE — 72110 X-RAY EXAM L-2 SPINE 4/>VWS: CPT | Mod: 26,,, | Performed by: RADIOLOGY

## 2022-06-01 PROCEDURE — 3077F PR MOST RECENT SYSTOLIC BLOOD PRESSURE >= 140 MM HG: ICD-10-PCS | Mod: CPTII,S$GLB,, | Performed by: INTERNAL MEDICINE

## 2022-06-01 PROCEDURE — 72110 X-RAY EXAM L-2 SPINE 4/>VWS: CPT | Mod: TC

## 2022-06-01 PROCEDURE — 72202 X-RAY EXAM SI JOINTS 3/> VWS: CPT | Mod: 26,,, | Performed by: RADIOLOGY

## 2022-06-01 PROCEDURE — 99999 PR PBB SHADOW E&M-EST. PATIENT-LVL III: ICD-10-PCS | Mod: PBBFAC,,, | Performed by: INTERNAL MEDICINE

## 2022-06-01 PROCEDURE — 99214 OFFICE O/P EST MOD 30 MIN: CPT | Mod: S$GLB,,, | Performed by: INTERNAL MEDICINE

## 2022-06-01 PROCEDURE — 3008F PR BODY MASS INDEX (BMI) DOCUMENTED: ICD-10-PCS | Mod: CPTII,S$GLB,, | Performed by: INTERNAL MEDICINE

## 2022-06-01 PROCEDURE — 99999 PR PBB SHADOW E&M-EST. PATIENT-LVL III: CPT | Mod: PBBFAC,,, | Performed by: INTERNAL MEDICINE

## 2022-06-01 PROCEDURE — 3008F BODY MASS INDEX DOCD: CPT | Mod: CPTII,S$GLB,, | Performed by: INTERNAL MEDICINE

## 2022-06-01 PROCEDURE — 3079F DIAST BP 80-89 MM HG: CPT | Mod: CPTII,S$GLB,, | Performed by: INTERNAL MEDICINE

## 2022-06-01 PROCEDURE — 1159F MED LIST DOCD IN RCRD: CPT | Mod: CPTII,S$GLB,, | Performed by: INTERNAL MEDICINE

## 2022-06-01 PROCEDURE — 72110 XR LUMBAR SPINE AP AND LAT WITH FLEX/EXT: ICD-10-PCS | Mod: 26,,, | Performed by: RADIOLOGY

## 2022-06-01 PROCEDURE — 72202 XR SACROILIAC JOINTS COMPLETE: ICD-10-PCS | Mod: 26,,, | Performed by: RADIOLOGY

## 2022-06-01 PROCEDURE — 1160F RVW MEDS BY RX/DR IN RCRD: CPT | Mod: CPTII,S$GLB,, | Performed by: INTERNAL MEDICINE

## 2022-06-01 PROCEDURE — 3079F PR MOST RECENT DIASTOLIC BLOOD PRESSURE 80-89 MM HG: ICD-10-PCS | Mod: CPTII,S$GLB,, | Performed by: INTERNAL MEDICINE

## 2022-06-01 PROCEDURE — 99214 PR OFFICE/OUTPT VISIT, EST, LEVL IV, 30-39 MIN: ICD-10-PCS | Mod: S$GLB,,, | Performed by: INTERNAL MEDICINE

## 2022-06-01 RX ORDER — CELECOXIB 200 MG/1
200 CAPSULE ORAL 2 TIMES DAILY
Qty: 30 CAPSULE | Refills: 0 | Status: SHIPPED | OUTPATIENT
Start: 2022-06-01 | End: 2022-06-21

## 2022-06-01 NOTE — PROGRESS NOTES
Radiograph reveals degenerative changes of the lower lumbar vertebrae.  Continue as instructed.  If pain still persists, referred to physical therapy

## 2022-07-01 ENCOUNTER — TELEPHONE (OUTPATIENT)
Dept: INTERNAL MEDICINE | Facility: CLINIC | Age: 57
End: 2022-07-01
Payer: COMMERCIAL

## 2022-07-01 NOTE — TELEPHONE ENCOUNTER
Inform patient on that the PSA blood test the screens for prostate cancer was normal or at a low number  Also the chemistry test showed normal kidney function, electrolytes and there was no diabetes

## 2022-07-01 NOTE — TELEPHONE ENCOUNTER
----- Message from Ashli Saez sent at 7/1/2022  8:10 AM CDT -----  Contact: pt [phone]  Calling to get test results.  Name of test: prostate  Date of test: 06/01/2022  Where was the test performed: [facility]  Would you like a call back, or a response through your [facility] portal?:   call  Comments:     Please call and advise.    Thank You

## 2022-07-08 ENCOUNTER — TELEPHONE (OUTPATIENT)
Dept: ORTHOPEDICS | Facility: CLINIC | Age: 57
End: 2022-07-08
Payer: COMMERCIAL

## 2022-07-08 NOTE — TELEPHONE ENCOUNTER
LVM to offer appt for knee pain. If this FALL occurred at work and is related to worker's comp, pt must first see OCC MED; if not, ok to schedule per Ortho decision tree.

## 2022-10-29 NOTE — PROGRESS NOTES
MEDICAL HISTORY  Hypertension  Glaucoma  Lumbar spondylosis with lumbar radiculopathy  Osteoarthritis hip     Social history  Tobacco use none  Alcohol use limited  Works as          Family history  Noted in epic medical record    history diabetes hypertension     Screening  Reported colonoscopy at [facility] 2017 normal    Component      Latest Ref Rng & Units 6/1/2022   Sodium      136 - 145 mmol/L 137   Potassium      3.5 - 5.1 mmol/L 3.8   Chloride      95 - 110 mmol/L 105   CO2      23 - 29 mmol/L 25   Glucose      70 - 110 mg/dL 107   BUN      6 - 20 mg/dL 16   Creatinine      0.5 - 1.4 mg/dL 1.0   Calcium      8.7 - 10.5 mg/dL 9.0   Anion Gap      8 - 16 mmol/L 7 (L)   eGFR if African American      >60 mL/min/1.73 m:2 >60.0   eGFR if non African American      >60 mL/min/1.73 m:2 >60.0   PSA, Screen      0.00 - 4.00 ng/mL 1.6       57-year-old male     Presents for an annual routine visit       S main problem in complain is issues regarding his left knee.  At [facility]  in August he underwent repair of a meniscus tear.  Since this time he has has developed swelling and persistent pain involving the left knee.  At present he is on the medications Bactrim, meloxicam, Flexeril.  Of the I believe Bactrim was started maybe a week a 2 weeks ago.  At that time he notices that the medial aspect of his left knee was feeling hot warm not was extending up to the thigh.  In addition was also noted that he had an ultrasound of his legs to rule out DVT which was the case.  Supposedly appointments to be set up with another orthopedic specialist and he is going to be having an ultrasound study to evaluate the IT band    Otherwise he reports no chest pain, palpitations, shortness breath, abdominal pain.  He has regular bowel function.  No difficulty urinating no incontinence.  No indigestion heartburn      Medications  Noted above and that of eyedrops for glaucoma      In the past he was on medication for  treatment of hypertension but he has never elected to stay on medications because he does not like taking medications    Examination   Weight 194 lb   Pulse 72   Blood pressure 144/82   HEENT exam no abnormal findings   Neck no thyromegaly no masses   Chest clear breath sounds good effort   Heart regular rate rhythm, no murmurs or gallops   Abdominal exam soft nontender no hepatosplenomegaly abdominal masses  Pulses 2+ carotid pulses no bruits 2+ pedal pulses  Extremities no edema  Lymph gland palpable adenopathy  Musculoskeletal there is some minimal degree of warmth and soft tissue swelling about the left knee.  Flexion is restricted in some degree of pain      Impression   General examination   Hypertension  Chronic synovitis left knee    Plan   Routine labs including inflammatory markers.  Is noted a PSA in June was normal   Recommend referred to the digital hypertension medicine program

## 2022-10-31 ENCOUNTER — LAB VISIT (OUTPATIENT)
Dept: LAB | Facility: HOSPITAL | Age: 57
End: 2022-10-31
Attending: INTERNAL MEDICINE
Payer: COMMERCIAL

## 2022-10-31 ENCOUNTER — OFFICE VISIT (OUTPATIENT)
Dept: INTERNAL MEDICINE | Facility: CLINIC | Age: 57
End: 2022-10-31
Payer: COMMERCIAL

## 2022-10-31 VITALS
HEIGHT: 74 IN | OXYGEN SATURATION: 98 % | DIASTOLIC BLOOD PRESSURE: 80 MMHG | BODY MASS INDEX: 24.9 KG/M2 | SYSTOLIC BLOOD PRESSURE: 142 MMHG | HEART RATE: 73 BPM | WEIGHT: 194 LBS

## 2022-10-31 DIAGNOSIS — I10 ESSENTIAL HYPERTENSION: ICD-10-CM

## 2022-10-31 DIAGNOSIS — Z00.00 ANNUAL PHYSICAL EXAM: Primary | ICD-10-CM

## 2022-10-31 DIAGNOSIS — Z00.00 ANNUAL PHYSICAL EXAM: ICD-10-CM

## 2022-10-31 DIAGNOSIS — M65.9 SYNOVITIS OF LEFT KNEE: ICD-10-CM

## 2022-10-31 LAB — ERYTHROCYTE [SEDIMENTATION RATE] IN BLOOD BY PHOTOMETRIC METHOD: <2 MM/HR (ref 0–23)

## 2022-10-31 PROCEDURE — 1159F MED LIST DOCD IN RCRD: CPT | Mod: CPTII,S$GLB,, | Performed by: INTERNAL MEDICINE

## 2022-10-31 PROCEDURE — 3077F SYST BP >= 140 MM HG: CPT | Mod: CPTII,S$GLB,, | Performed by: INTERNAL MEDICINE

## 2022-10-31 PROCEDURE — 1160F RVW MEDS BY RX/DR IN RCRD: CPT | Mod: CPTII,S$GLB,, | Performed by: INTERNAL MEDICINE

## 2022-10-31 PROCEDURE — 1160F PR REVIEW ALL MEDS BY PRESCRIBER/CLIN PHARMACIST DOCUMENTED: ICD-10-PCS | Mod: CPTII,S$GLB,, | Performed by: INTERNAL MEDICINE

## 2022-10-31 PROCEDURE — 36415 COLL VENOUS BLD VENIPUNCTURE: CPT | Performed by: INTERNAL MEDICINE

## 2022-10-31 PROCEDURE — 99396 PREV VISIT EST AGE 40-64: CPT | Mod: S$GLB,,, | Performed by: INTERNAL MEDICINE

## 2022-10-31 PROCEDURE — 86140 C-REACTIVE PROTEIN: CPT | Performed by: INTERNAL MEDICINE

## 2022-10-31 PROCEDURE — 3079F PR MOST RECENT DIASTOLIC BLOOD PRESSURE 80-89 MM HG: ICD-10-PCS | Mod: CPTII,S$GLB,, | Performed by: INTERNAL MEDICINE

## 2022-10-31 PROCEDURE — 80061 LIPID PANEL: CPT | Performed by: INTERNAL MEDICINE

## 2022-10-31 PROCEDURE — 3077F PR MOST RECENT SYSTOLIC BLOOD PRESSURE >= 140 MM HG: ICD-10-PCS | Mod: CPTII,S$GLB,, | Performed by: INTERNAL MEDICINE

## 2022-10-31 PROCEDURE — 3079F DIAST BP 80-89 MM HG: CPT | Mod: CPTII,S$GLB,, | Performed by: INTERNAL MEDICINE

## 2022-10-31 PROCEDURE — 85025 COMPLETE CBC W/AUTO DIFF WBC: CPT | Performed by: INTERNAL MEDICINE

## 2022-10-31 PROCEDURE — 99999 PR PBB SHADOW E&M-EST. PATIENT-LVL IV: CPT | Mod: PBBFAC,,, | Performed by: INTERNAL MEDICINE

## 2022-10-31 PROCEDURE — 85652 RBC SED RATE AUTOMATED: CPT | Performed by: INTERNAL MEDICINE

## 2022-10-31 PROCEDURE — 99999 PR PBB SHADOW E&M-EST. PATIENT-LVL IV: ICD-10-PCS | Mod: PBBFAC,,, | Performed by: INTERNAL MEDICINE

## 2022-10-31 PROCEDURE — 80053 COMPREHEN METABOLIC PANEL: CPT | Performed by: INTERNAL MEDICINE

## 2022-10-31 PROCEDURE — 99396 PR PREVENTIVE VISIT,EST,40-64: ICD-10-PCS | Mod: S$GLB,,, | Performed by: INTERNAL MEDICINE

## 2022-10-31 PROCEDURE — 1159F PR MEDICATION LIST DOCUMENTED IN MEDICAL RECORD: ICD-10-PCS | Mod: CPTII,S$GLB,, | Performed by: INTERNAL MEDICINE

## 2022-10-31 RX ORDER — CYCLOBENZAPRINE HCL 5 MG
5 TABLET ORAL NIGHTLY PRN
COMMUNITY
Start: 2022-10-26 | End: 2023-06-26

## 2022-10-31 RX ORDER — SULFAMETHOXAZOLE AND TRIMETHOPRIM 800; 160 MG/1; MG/1
1 TABLET ORAL 2 TIMES DAILY
COMMUNITY
Start: 2022-10-19 | End: 2023-06-26

## 2022-10-31 RX ORDER — MELOXICAM 15 MG/1
15 TABLET ORAL DAILY
COMMUNITY
Start: 2022-10-19 | End: 2023-06-26

## 2022-11-01 LAB
ALBUMIN SERPL BCP-MCNC: 4.2 G/DL (ref 3.5–5.2)
ALP SERPL-CCNC: 65 U/L (ref 55–135)
ALT SERPL W/O P-5'-P-CCNC: 25 U/L (ref 10–44)
ANION GAP SERPL CALC-SCNC: 7 MMOL/L (ref 8–16)
AST SERPL-CCNC: 27 U/L (ref 10–40)
BASOPHILS # BLD AUTO: 0.01 K/UL (ref 0–0.2)
BASOPHILS NFR BLD: 0.2 % (ref 0–1.9)
BILIRUB SERPL-MCNC: 0.7 MG/DL (ref 0.1–1)
BUN SERPL-MCNC: 18 MG/DL (ref 6–20)
CALCIUM SERPL-MCNC: 9.3 MG/DL (ref 8.7–10.5)
CHLORIDE SERPL-SCNC: 107 MMOL/L (ref 95–110)
CHOLEST SERPL-MCNC: 176 MG/DL (ref 120–199)
CHOLEST/HDLC SERPL: 2.9 {RATIO} (ref 2–5)
CO2 SERPL-SCNC: 24 MMOL/L (ref 23–29)
CREAT SERPL-MCNC: 1.4 MG/DL (ref 0.5–1.4)
CRP SERPL-MCNC: 2.4 MG/L (ref 0–8.2)
DIFFERENTIAL METHOD: ABNORMAL
EOSINOPHIL # BLD AUTO: 0.3 K/UL (ref 0–0.5)
EOSINOPHIL NFR BLD: 5.1 % (ref 0–8)
ERYTHROCYTE [DISTWIDTH] IN BLOOD BY AUTOMATED COUNT: 12.6 % (ref 11.5–14.5)
EST. GFR  (NO RACE VARIABLE): 58.6 ML/MIN/1.73 M^2
GLUCOSE SERPL-MCNC: 95 MG/DL (ref 70–110)
HCT VFR BLD AUTO: 41.2 % (ref 40–54)
HDLC SERPL-MCNC: 60 MG/DL (ref 40–75)
HDLC SERPL: 34.1 % (ref 20–50)
HGB BLD-MCNC: 13.3 G/DL (ref 14–18)
IMM GRANULOCYTES # BLD AUTO: 0.02 K/UL (ref 0–0.04)
IMM GRANULOCYTES NFR BLD AUTO: 0.4 % (ref 0–0.5)
LDLC SERPL CALC-MCNC: 105.2 MG/DL (ref 63–159)
LYMPHOCYTES # BLD AUTO: 1.5 K/UL (ref 1–4.8)
LYMPHOCYTES NFR BLD: 27.7 % (ref 18–48)
MCH RBC QN AUTO: 29.9 PG (ref 27–31)
MCHC RBC AUTO-ENTMCNC: 32.3 G/DL (ref 32–36)
MCV RBC AUTO: 93 FL (ref 82–98)
MONOCYTES # BLD AUTO: 0.6 K/UL (ref 0.3–1)
MONOCYTES NFR BLD: 10.3 % (ref 4–15)
NEUTROPHILS # BLD AUTO: 3.1 K/UL (ref 1.8–7.7)
NEUTROPHILS NFR BLD: 56.3 % (ref 38–73)
NONHDLC SERPL-MCNC: 116 MG/DL
NRBC BLD-RTO: 0 /100 WBC
PLATELET # BLD AUTO: 255 K/UL (ref 150–450)
PMV BLD AUTO: 10.7 FL (ref 9.2–12.9)
POTASSIUM SERPL-SCNC: 4.2 MMOL/L (ref 3.5–5.1)
PROT SERPL-MCNC: 7.4 G/DL (ref 6–8.4)
RBC # BLD AUTO: 4.45 M/UL (ref 4.6–6.2)
SODIUM SERPL-SCNC: 138 MMOL/L (ref 136–145)
TRIGL SERPL-MCNC: 54 MG/DL (ref 30–150)
WBC # BLD AUTO: 5.45 K/UL (ref 3.9–12.7)

## 2022-11-21 ENCOUNTER — TELEPHONE (OUTPATIENT)
Dept: INTERNAL MEDICINE | Facility: CLINIC | Age: 57
End: 2022-11-21
Payer: COMMERCIAL

## 2023-04-21 ENCOUNTER — PATIENT MESSAGE (OUTPATIENT)
Dept: ADMINISTRATIVE | Facility: HOSPITAL | Age: 58
End: 2023-04-21
Payer: COMMERCIAL

## 2023-04-28 ENCOUNTER — PATIENT MESSAGE (OUTPATIENT)
Dept: ADMINISTRATIVE | Facility: HOSPITAL | Age: 58
End: 2023-04-28
Payer: COMMERCIAL

## 2023-04-28 ENCOUNTER — PATIENT OUTREACH (OUTPATIENT)
Dept: ADMINISTRATIVE | Facility: HOSPITAL | Age: 58
End: 2023-04-28
Payer: COMMERCIAL

## 2023-04-28 NOTE — PROGRESS NOTES
Health Maintenance Due   Topic Date Due    Colorectal Cancer Screening  Never done    Shingles Vaccine (1 of 2) Never done    COVID-19 Vaccine (4 - Booster for Moderna series) 03/03/2022    Influenza Vaccine (1) 09/01/2022     Chart review done. HM updated. Immunizations reviewed & updated. Care Everywhere updated.  Portal Message sent re:  clarifying colonoscopy location. Notes state done in 2017 at [facility]. Unable to find record.

## 2023-06-19 ENCOUNTER — PATIENT OUTREACH (OUTPATIENT)
Dept: ADMINISTRATIVE | Facility: HOSPITAL | Age: 58
End: 2023-06-19
Payer: COMMERCIAL

## 2023-06-19 NOTE — PROGRESS NOTES
Health Maintenance Due   Topic Date Due    Colorectal Cancer Screening  Never done    Shingles Vaccine (1 of 2) Never done    COVID-19 Vaccine (4 - Moderna series) 03/03/2022       Chart reviewed.   Immunizations: Reconciled  Orders placed: N/A  Upcoming appts to satisfy JOAQUINA topics: N/A

## 2023-06-25 NOTE — PROGRESS NOTES
MEDICAL HISTORY  Hypertension  Glaucoma  Lumbar spondylosis with lumbar radiculopathy  Osteoarthritis hip     Social history  Tobacco use none  Alcohol use limited  Works as      Family history   Per med card  Diabetes, hypertension    Screening   Reported colonoscopy 2017, polyps were resected      58-year-old male   Presents for wellness visit but the ongoing problems is been lumbar pain.  This is been chronic.  Been seen multiple times for this.  Is known to have a degree of lumbar degenerative disc disease.  It is mainly on the left side but can extend to the mid and right side.  Presently not having pain extends to the buttocks or legs.  He will notice it more when lying down to sitting up for length of time.  Also complains of stiffness on the left side of his neck.  He tends to wear back brace.  He works at the the [employer] as .    He has a history of hypertension with elevated office readings on multiple occasions.  Medications have been prescribed but he is never consistently took the medications    Review of symptoms  Negative for chest pain, palpitations, shortness breath, abdominal pain.  Reports regular bowel function.  No difficulty urinating., nocturia x1.  No indigestion heartburn.  No headaches.      Examination   Weight 193 lb  Pulse 64   Blood pressure 156/92  HEENT exam no abnormal findings  Neck no thyromegaly no masses  Chest clear breath sounds  Heart regular rate rhythm  Abdominal exam nontender soft no hepatosplenomegaly abdominal masses  Pulses 2+ carotid pulses no bruits 2+ pedal pulses  Extremities no edema  Lymph gland, no palpable adenopathy  2+ right knee, 1+ left knee reflex  Trace bilateral ankle jerk reflex with straight leg raise he actually has right low back pain    Impression   General examination   Hypertension  Chronic lumbar pain with lumbar degenerative disc  Prostate cancer screening  History colon polyps    Plan   Routine labs including  PSA  Screening colonoscopy  Discussed medication for hypertension but present would prefer to be in the digital hypertension medicine program will re-initiate this  MRI the lumbar spine   Tizanidine 4 mg at bedtime and twice a day as needed

## 2023-06-26 ENCOUNTER — LAB VISIT (OUTPATIENT)
Dept: LAB | Facility: HOSPITAL | Age: 58
End: 2023-06-26
Attending: INTERNAL MEDICINE
Payer: COMMERCIAL

## 2023-06-26 ENCOUNTER — PATIENT MESSAGE (OUTPATIENT)
Dept: INTERNAL MEDICINE | Facility: CLINIC | Age: 58
End: 2023-06-26

## 2023-06-26 ENCOUNTER — OFFICE VISIT (OUTPATIENT)
Dept: INTERNAL MEDICINE | Facility: CLINIC | Age: 58
End: 2023-06-26
Payer: COMMERCIAL

## 2023-06-26 VITALS
HEIGHT: 74 IN | HEART RATE: 62 BPM | BODY MASS INDEX: 24.77 KG/M2 | SYSTOLIC BLOOD PRESSURE: 146 MMHG | OXYGEN SATURATION: 98 % | DIASTOLIC BLOOD PRESSURE: 84 MMHG | WEIGHT: 193 LBS

## 2023-06-26 DIAGNOSIS — Z12.5 PROSTATE CANCER SCREENING: ICD-10-CM

## 2023-06-26 DIAGNOSIS — M47.816 LUMBAR SPONDYLOSIS: ICD-10-CM

## 2023-06-26 DIAGNOSIS — I10 ESSENTIAL HYPERTENSION: ICD-10-CM

## 2023-06-26 DIAGNOSIS — Z12.11 COLON CANCER SCREENING: ICD-10-CM

## 2023-06-26 DIAGNOSIS — M54.9 DORSALGIA, UNSPECIFIED: ICD-10-CM

## 2023-06-26 DIAGNOSIS — Z00.00 WELLNESS EXAMINATION: ICD-10-CM

## 2023-06-26 DIAGNOSIS — Z86.010 HISTORY OF COLONIC POLYPS: ICD-10-CM

## 2023-06-26 DIAGNOSIS — Z00.00 WELLNESS EXAMINATION: Primary | ICD-10-CM

## 2023-06-26 LAB
ALBUMIN SERPL BCP-MCNC: 4 G/DL (ref 3.5–5.2)
ALP SERPL-CCNC: 61 U/L (ref 55–135)
ALT SERPL W/O P-5'-P-CCNC: 26 U/L (ref 10–44)
ANION GAP SERPL CALC-SCNC: 10 MMOL/L (ref 8–16)
AST SERPL-CCNC: 22 U/L (ref 10–40)
BASOPHILS # BLD AUTO: 0.02 K/UL (ref 0–0.2)
BASOPHILS NFR BLD: 0.5 % (ref 0–1.9)
BILIRUB SERPL-MCNC: 0.6 MG/DL (ref 0.1–1)
BUN SERPL-MCNC: 16 MG/DL (ref 6–20)
CALCIUM SERPL-MCNC: 9.1 MG/DL (ref 8.7–10.5)
CHLORIDE SERPL-SCNC: 104 MMOL/L (ref 95–110)
CHOLEST SERPL-MCNC: 168 MG/DL (ref 120–199)
CHOLEST/HDLC SERPL: 2.8 {RATIO} (ref 2–5)
CO2 SERPL-SCNC: 26 MMOL/L (ref 23–29)
COMPLEXED PSA SERPL-MCNC: 1.8 NG/ML (ref 0–4)
CREAT SERPL-MCNC: 1.2 MG/DL (ref 0.5–1.4)
DIFFERENTIAL METHOD: ABNORMAL
EOSINOPHIL # BLD AUTO: 0.2 K/UL (ref 0–0.5)
EOSINOPHIL NFR BLD: 4.6 % (ref 0–8)
ERYTHROCYTE [DISTWIDTH] IN BLOOD BY AUTOMATED COUNT: 12.1 % (ref 11.5–14.5)
EST. GFR  (NO RACE VARIABLE): >60 ML/MIN/1.73 M^2
GLUCOSE SERPL-MCNC: 88 MG/DL (ref 70–110)
HCT VFR BLD AUTO: 41.7 % (ref 40–54)
HDLC SERPL-MCNC: 59 MG/DL (ref 40–75)
HDLC SERPL: 35.1 % (ref 20–50)
HGB BLD-MCNC: 13.5 G/DL (ref 14–18)
IMM GRANULOCYTES # BLD AUTO: 0.01 K/UL (ref 0–0.04)
IMM GRANULOCYTES NFR BLD AUTO: 0.2 % (ref 0–0.5)
LDLC SERPL CALC-MCNC: 97.8 MG/DL (ref 63–159)
LYMPHOCYTES # BLD AUTO: 1.5 K/UL (ref 1–4.8)
LYMPHOCYTES NFR BLD: 35.3 % (ref 18–48)
MCH RBC QN AUTO: 29.4 PG (ref 27–31)
MCHC RBC AUTO-ENTMCNC: 32.4 G/DL (ref 32–36)
MCV RBC AUTO: 91 FL (ref 82–98)
MONOCYTES # BLD AUTO: 0.5 K/UL (ref 0.3–1)
MONOCYTES NFR BLD: 11.1 % (ref 4–15)
NEUTROPHILS # BLD AUTO: 2 K/UL (ref 1.8–7.7)
NEUTROPHILS NFR BLD: 48.3 % (ref 38–73)
NONHDLC SERPL-MCNC: 109 MG/DL
NRBC BLD-RTO: 0 /100 WBC
PLATELET # BLD AUTO: 224 K/UL (ref 150–450)
PMV BLD AUTO: 10.7 FL (ref 9.2–12.9)
POTASSIUM SERPL-SCNC: 4.4 MMOL/L (ref 3.5–5.1)
PROT SERPL-MCNC: 7.1 G/DL (ref 6–8.4)
RBC # BLD AUTO: 4.59 M/UL (ref 4.6–6.2)
SODIUM SERPL-SCNC: 140 MMOL/L (ref 136–145)
TRIGL SERPL-MCNC: 56 MG/DL (ref 30–150)
WBC # BLD AUTO: 4.14 K/UL (ref 3.9–12.7)

## 2023-06-26 PROCEDURE — 85025 COMPLETE CBC W/AUTO DIFF WBC: CPT | Performed by: INTERNAL MEDICINE

## 2023-06-26 PROCEDURE — 99999 PR PBB SHADOW E&M-EST. PATIENT-LVL IV: CPT | Mod: PBBFAC,,, | Performed by: INTERNAL MEDICINE

## 2023-06-26 PROCEDURE — 3008F PR BODY MASS INDEX (BMI) DOCUMENTED: ICD-10-PCS | Mod: CPTII,S$GLB,, | Performed by: INTERNAL MEDICINE

## 2023-06-26 PROCEDURE — 99396 PREV VISIT EST AGE 40-64: CPT | Mod: S$GLB,,, | Performed by: INTERNAL MEDICINE

## 2023-06-26 PROCEDURE — 1160F PR REVIEW ALL MEDS BY PRESCRIBER/CLIN PHARMACIST DOCUMENTED: ICD-10-PCS | Mod: CPTII,S$GLB,, | Performed by: INTERNAL MEDICINE

## 2023-06-26 PROCEDURE — 3008F BODY MASS INDEX DOCD: CPT | Mod: CPTII,S$GLB,, | Performed by: INTERNAL MEDICINE

## 2023-06-26 PROCEDURE — 80053 COMPREHEN METABOLIC PANEL: CPT | Performed by: INTERNAL MEDICINE

## 2023-06-26 PROCEDURE — 3079F PR MOST RECENT DIASTOLIC BLOOD PRESSURE 80-89 MM HG: ICD-10-PCS | Mod: CPTII,S$GLB,, | Performed by: INTERNAL MEDICINE

## 2023-06-26 PROCEDURE — 99999 PR PBB SHADOW E&M-EST. PATIENT-LVL IV: ICD-10-PCS | Mod: PBBFAC,,, | Performed by: INTERNAL MEDICINE

## 2023-06-26 PROCEDURE — 3077F PR MOST RECENT SYSTOLIC BLOOD PRESSURE >= 140 MM HG: ICD-10-PCS | Mod: CPTII,S$GLB,, | Performed by: INTERNAL MEDICINE

## 2023-06-26 PROCEDURE — 84153 ASSAY OF PSA TOTAL: CPT | Performed by: INTERNAL MEDICINE

## 2023-06-26 PROCEDURE — 3077F SYST BP >= 140 MM HG: CPT | Mod: CPTII,S$GLB,, | Performed by: INTERNAL MEDICINE

## 2023-06-26 PROCEDURE — 3079F DIAST BP 80-89 MM HG: CPT | Mod: CPTII,S$GLB,, | Performed by: INTERNAL MEDICINE

## 2023-06-26 PROCEDURE — 99396 PR PREVENTIVE VISIT,EST,40-64: ICD-10-PCS | Mod: S$GLB,,, | Performed by: INTERNAL MEDICINE

## 2023-06-26 PROCEDURE — 80061 LIPID PANEL: CPT | Performed by: INTERNAL MEDICINE

## 2023-06-26 PROCEDURE — 1160F RVW MEDS BY RX/DR IN RCRD: CPT | Mod: CPTII,S$GLB,, | Performed by: INTERNAL MEDICINE

## 2023-06-26 PROCEDURE — 1159F MED LIST DOCD IN RCRD: CPT | Mod: CPTII,S$GLB,, | Performed by: INTERNAL MEDICINE

## 2023-06-26 PROCEDURE — 1159F PR MEDICATION LIST DOCUMENTED IN MEDICAL RECORD: ICD-10-PCS | Mod: CPTII,S$GLB,, | Performed by: INTERNAL MEDICINE

## 2023-06-26 PROCEDURE — 36415 COLL VENOUS BLD VENIPUNCTURE: CPT | Performed by: INTERNAL MEDICINE

## 2023-06-26 RX ORDER — TIZANIDINE 4 MG/1
4 TABLET ORAL EVERY 6 HOURS PRN
Qty: 30 TABLET | Refills: 1 | Status: SHIPPED | OUTPATIENT
Start: 2023-06-26

## 2023-07-01 ENCOUNTER — HOSPITAL ENCOUNTER (OUTPATIENT)
Dept: RADIOLOGY | Facility: OTHER | Age: 58
Discharge: HOME | End: 2023-07-01
Attending: INTERNAL MEDICINE
Payer: COMMERCIAL

## 2023-07-01 DIAGNOSIS — M54.9 DORSALGIA, UNSPECIFIED: ICD-10-CM

## 2023-07-01 DIAGNOSIS — M47.816 LUMBAR SPONDYLOSIS: ICD-10-CM

## 2023-07-01 PROCEDURE — 72148 MRI LUMBAR SPINE W/O DYE: CPT | Mod: 26,,, | Performed by: RADIOLOGY

## 2023-07-01 PROCEDURE — 72148 MRI LUMBAR SPINE W/O DYE: CPT | Mod: TC

## 2023-07-01 PROCEDURE — 72148 MRI LUMBAR SPINE WITHOUT CONTRAST: ICD-10-PCS | Mod: 26,,, | Performed by: RADIOLOGY

## 2023-07-05 ENCOUNTER — TELEPHONE (OUTPATIENT)
Dept: PRIMARY CARE CLINIC | Facility: CLINIC | Age: 58
End: 2023-07-05
Payer: COMMERCIAL

## 2023-07-05 NOTE — TELEPHONE ENCOUNTER
----- Message from Jessica Hicks sent at 7/5/2023  8:45 AM CDT -----  Contact: [phone] @ patient  Good morning patient would like a call back to get his lab work and MRI results that he took on last week. Please give patient a call back [phone]

## 2023-07-27 ENCOUNTER — PATIENT MESSAGE (OUTPATIENT)
Dept: INTERNAL MEDICINE | Facility: CLINIC | Age: 58
End: 2023-07-27
Payer: COMMERCIAL

## 2023-07-27 ENCOUNTER — DOCUMENTATION ONLY (OUTPATIENT)
Dept: INTERNAL MEDICINE | Facility: CLINIC | Age: 58
End: 2023-07-27
Payer: COMMERCIAL

## 2023-07-27 DIAGNOSIS — M47.816 LUMBAR FACET ARTHROPATHY: Primary | ICD-10-CM

## 2023-07-27 NOTE — PROGRESS NOTES
Internal medicine note     Labs and MRI reviewed..  Labs in general look good.  MRI shows advanced facet arthropathy at L4-5 and L5-1.  Mild degree of neuroforaminal stenosis.  He finds if he wears a back brace he gets pain relief but he still has pain throughout the day.  Will be put in referral to meet with the healthy back program I and pain clinc

## 2023-08-09 ENCOUNTER — TELEPHONE (OUTPATIENT)
Dept: ENDOSCOPY | Facility: HOSPITAL | Age: 58
End: 2023-08-09

## 2023-08-09 ENCOUNTER — CLINICAL SUPPORT (OUTPATIENT)
Dept: ENDOSCOPY | Facility: HOSPITAL | Age: 58
End: 2023-08-09
Attending: INTERNAL MEDICINE
Payer: COMMERCIAL

## 2023-08-09 VITALS — WEIGHT: 191 LBS | BODY MASS INDEX: 24.51 KG/M2 | HEIGHT: 74 IN

## 2023-08-09 DIAGNOSIS — Z12.11 COLON CANCER SCREENING: ICD-10-CM

## 2023-08-09 DIAGNOSIS — Z86.010 HISTORY OF COLONIC POLYPS: ICD-10-CM

## 2023-08-09 DIAGNOSIS — Z00.00 WELLNESS EXAMINATION: ICD-10-CM

## 2023-08-09 DIAGNOSIS — Z12.11 SPECIAL SCREENING FOR MALIGNANT NEOPLASMS, COLON: Primary | ICD-10-CM

## 2023-08-09 NOTE — TELEPHONE ENCOUNTER
Spoke to patient to schedule procedure(s) Colonoscopy       Physician to perform procedure(s) Dr. ZAC Mak  Date of Procedure (s) 10/17/23  Arrival Time 1:15 PM  Time of Procedure(s) 2:15 PM   Location of Procedure(s) [place] 4th Floor  Type of Rx Prep sent to patient: PEG  Instructions provided to patient via [facility]    Patient was informed on the following information and verbalized understanding. Screening questionnaire reviewed with patient and complete. If procedure requires anesthesia, a responsible adult needs to be present to accompany the patient home, patient cannot drive after receiving anesthesia. Appointment details are tentative, especially check-in time. Patient will receive a prep-op call 4 days prior to confirm check-in time for procedure. If applicable the patient should contact their pharmacy to verify Rx for procedure prep is ready for pick-up. Patient was advised to call the scheduling department at [phone] if pharmacy states no Rx is available. Patient was advised to call the endoscopy scheduling department if any questions or concerns arise.      [facility] Scheduling Department

## 2023-08-29 ENCOUNTER — OFFICE VISIT (OUTPATIENT)
Dept: PAIN MEDICINE | Facility: CLINIC | Age: 58
End: 2023-08-29
Payer: COMMERCIAL

## 2023-08-29 VITALS
DIASTOLIC BLOOD PRESSURE: 98 MMHG | RESPIRATION RATE: 18 BRPM | OXYGEN SATURATION: 100 % | SYSTOLIC BLOOD PRESSURE: 143 MMHG | BODY MASS INDEX: 24.05 KG/M2 | WEIGHT: 187.38 LBS | TEMPERATURE: 99 F | HEIGHT: 74 IN | HEART RATE: 64 BPM

## 2023-08-29 DIAGNOSIS — G89.4 CHRONIC PAIN DISORDER: ICD-10-CM

## 2023-08-29 DIAGNOSIS — M51.36 DDD (DEGENERATIVE DISC DISEASE), LUMBAR: Primary | ICD-10-CM

## 2023-08-29 DIAGNOSIS — M47.816 LUMBAR FACET ARTHROPATHY: ICD-10-CM

## 2023-08-29 PROCEDURE — 1159F MED LIST DOCD IN RCRD: CPT | Mod: CPTII,S$GLB,, | Performed by: ANESTHESIOLOGY

## 2023-08-29 PROCEDURE — 1159F PR MEDICATION LIST DOCUMENTED IN MEDICAL RECORD: ICD-10-PCS | Mod: CPTII,S$GLB,, | Performed by: ANESTHESIOLOGY

## 2023-08-29 PROCEDURE — 3077F SYST BP >= 140 MM HG: CPT | Mod: CPTII,S$GLB,, | Performed by: ANESTHESIOLOGY

## 2023-08-29 PROCEDURE — 3008F BODY MASS INDEX DOCD: CPT | Mod: CPTII,S$GLB,, | Performed by: ANESTHESIOLOGY

## 2023-08-29 PROCEDURE — 99999 PR PBB SHADOW E&M-EST. PATIENT-LVL V: ICD-10-PCS | Mod: PBBFAC,,, | Performed by: ANESTHESIOLOGY

## 2023-08-29 PROCEDURE — 3080F DIAST BP >= 90 MM HG: CPT | Mod: CPTII,S$GLB,, | Performed by: ANESTHESIOLOGY

## 2023-08-29 PROCEDURE — 3077F PR MOST RECENT SYSTOLIC BLOOD PRESSURE >= 140 MM HG: ICD-10-PCS | Mod: CPTII,S$GLB,, | Performed by: ANESTHESIOLOGY

## 2023-08-29 PROCEDURE — 3008F PR BODY MASS INDEX (BMI) DOCUMENTED: ICD-10-PCS | Mod: CPTII,S$GLB,, | Performed by: ANESTHESIOLOGY

## 2023-08-29 PROCEDURE — 1160F PR REVIEW ALL MEDS BY PRESCRIBER/CLIN PHARMACIST DOCUMENTED: ICD-10-PCS | Mod: CPTII,S$GLB,, | Performed by: ANESTHESIOLOGY

## 2023-08-29 PROCEDURE — 3080F PR MOST RECENT DIASTOLIC BLOOD PRESSURE >= 90 MM HG: ICD-10-PCS | Mod: CPTII,S$GLB,, | Performed by: ANESTHESIOLOGY

## 2023-08-29 PROCEDURE — 99214 OFFICE O/P EST MOD 30 MIN: CPT | Mod: S$GLB,,, | Performed by: ANESTHESIOLOGY

## 2023-08-29 PROCEDURE — 99999 PR PBB SHADOW E&M-EST. PATIENT-LVL V: CPT | Mod: PBBFAC,,, | Performed by: ANESTHESIOLOGY

## 2023-08-29 PROCEDURE — 1160F RVW MEDS BY RX/DR IN RCRD: CPT | Mod: CPTII,S$GLB,, | Performed by: ANESTHESIOLOGY

## 2023-08-29 PROCEDURE — 99214 PR OFFICE/OUTPT VISIT, EST, LEVL IV, 30-39 MIN: ICD-10-PCS | Mod: S$GLB,,, | Performed by: ANESTHESIOLOGY

## 2023-08-29 NOTE — PROGRESS NOTES
Chronic Pain - Follow Up    Referring Physician: Mando Rice MD    Chief Complaint:   Chief Complaint   Patient presents with    Low-back Pain        SUBJECTIVE:    Interval History 8/29/23:  Pt returns to clinic for f/u of his low back pain. Pt saw Dr. Mackenzie sweet in 2020 and was scheduled to have lumbar DANIELLE. Pt did not undergo procedure and has been lost to f/u since then. He now returns today with chief complaint of low left back/buttock pain that forces him to sit down and take breaks. He has not taken anything for his pain as he does not like taking medications. He was prescribed Neurontin and flexeril- at his last visit but did not take these medications. The pain does not radiate anywhere and is localized in his lower back and left buttock region. Pt does have a history of falls and states his last fall was august 2022.      Initial HPI:  Tobias Capellan is a 54 y/o male with a history of Glaucoma, HTN who presents to clinic for evaluation of pain in the left lower back and lower extremity.   First started to notice pain many years ago but it was mild. In August 2018, he fell off a ladder and hit his head with resulting loss of consciousness. Does not recall if he fell on his back. Since then, the pain has started acting up more. On 9/29/2020, he fell off a truck while attempting to get off it. Thinks his left leg 'gave away'.  Since then, the pain has been constant. Describes it as a aching/constricting pain that starts around the left lower back and involves the left anterior thigh, left shin. Feels like his left hip and thigh are 'tight'. Also has numbness of the left foot. On a good day, pain is 5-6/10 and on a bad day, it is 8/10. Pain is exacerbated on walking, stretching his leg. Relieved by laying down, leaning forward. The pain is mitigated by heat and ice. He reports spending 4 hours per day reclining. The patient reports 6 hours of uninterrupted sleep per night and is occasionally  disturbed by the pain.    Denies any incontinence, gait instability. Feels his left leg is getting weaker.  MRI of the lumbar spine in Sept 2020 shows bilateral neural foraminal narrowing at L4/5 and R L5/S1 foraminal narrowing.   EMG/NCS was suggestive of a left L5/S1 radiculopathy.     Denies any alcohol, tobacco history. Denies any drug use.    Received Toradol IM x1 on 9/29 which provided minimal relief. Also received a short course of steroids which didn't helped.  Has tried Meloxicam and Etodolac in the past but neither have helped. Has also tried Robaxin in the past few weeks which also hasn't helped.     He tried OTC Theraflu which has helped for the pain.    Patient denies none.    Physical Therapy/Home Exercise: no      Pain Disability Index Review:      8/29/2023     3:10 PM 8/29/2023     3:09 PM 11/17/2020     9:07 AM   Last 3 PDI Scores   Pain Disability Index (PDI) [phone]       Pain Medications:    - Others: Etodolac     report:  Reviewed and consistent with medication use as prescribed.    Pain Procedures: none    Imaging:   Narrative & Impression     EXAMINATION:  MRI PELVIS WITHOUT CONTRAST     CLINICAL HISTORY:  Persistent left pelvic bone pain and perineal discomfort;  Pain in left hip     TECHNIQUE:  Axial, coronal imaging of the pelvis was performed.     COMPARISON:  MRI of the hip, 10/17/2020 at 13:26 hours     FINDINGS:  Pelvic bones appear intact without bone marrow edema or fracture.  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  There is a small focus of increased signal intensity in the superolateral labrum at the 12 o'clock position on T1 weighted sequences that is not confidently identified on T2 weighted sequences raising the question of either chondromalacia or small superior labral tear.     There is no effusion.  The hip rotators adductors and trochanteric muscles and tendons appear normal.  There is no significant bursitis.  The hamstring tendon insertions  appear normal.  The sciatic nerve course appears on and pinched.  The visualized lumbosacral junction appears unremarkable.     The seminal vesicles are slightly congested.  The prostate gland is not enlarged.  The urinary bladder appears unremarkable.  The loops of large and small intestines appear unremarkable.  The vascular structures appear unremarkable.     Impression:     Mild bilateral osteoarthritis changes in both hips with no evidence of fracture or AVN.     Slight irregularity at the 12 o'clock position of the superior labrum in the left hip raising the question of small labral tear or chondromalacia at the chondrolabral junction.  Consider arthrography if clinically warranted.        Electronically signed by: Gigi Gutiérrez  Date:                                            10/18/2020  Time:                                           21:34        Narrative & Impression     EXAMINATION:  MRI HIP WITHOUT CONTRAST LEFT     CLINICAL HISTORY:  Chronic left hip pain, perineal discomfort;  Pain in left hip     TECHNIQUE:  Multiplanar imaging and multi weighted imaging of the left hip was performed.  The right hip was included in the coronal and axial image sequences.     COMPARISON:  Plain radiograph obtained on the 1st July 2020     FINDINGS:  The left hip does not appear to be injured in the coronal or axial plane on T2 fat-suppressed weighted imaging sequences.  Specifically there does not appear to be any obvious disruption of the cortices nor does there appear to be evidence of bone bruising or bone edema.  No evidence of a joint effusion.  No indication of signal abnormalities involving the adductor or abductor muscles.  The appearance of the left hip is symmetric with the appearance of the right.  There is a small amount of osteoarthritic changes involving the acetabular roof as evidence by sclerosis of the area seen symmetrically from right to left.     Impression:     No obvious evidence of an acute or  chronic injury involving the left hip.     Mild osteoarthritic changes are noted with sclerotic articular surface of the acetabular roof.        Electronically signed by: Anderson Molina  Date:                                            10/18/2020  Time:                                           09:12     Narrative & Impression     EXAMINATION:  MRI LUMBAR SPINE WITHOUT CONTRAST     CLINICAL HISTORY:  Back pain or radiculopathy, > 6 wks; Dorsalgia, unspecified     TECHNIQUE:  Multiplanar, multisequence MR images were acquired from the thoracolumbar junction to the sacrum without the administration of contrast.     COMPARISON:  07/01/2020     FINDINGS:  Alignment: Trace anterolisthesis L4 on L5     Vertebrae: Normal marrow signal. No fracture.     Discs: Normal height and signal.     Cord: Normal.  Conus terminates at L1.     Degenerative findings:     T12-L1: Unremarkable     L1-L2: Unremarkable     L2-L3: Small circumferential disc bulge.  No nerve root compression.     L3-L4: Small circumferential disc bulge.  No nerve root compression.     L4-L5: Slight uncovering of the disc with circumferential disc bulge and facet arthropathy.  Mild bilateral neural foraminal narrowing.  Mild canal stenosis.     L5-S1: Circumferential disc bulge, paracentral to the right and facet arthropathy.  Mild right neural foraminal narrowing and narrowing of the right lateral recess.     Paraspinal muscles & soft tissues: Unremarkable.     Impression:     Degenerative changes as detailed above, most notable L4-5 and L5-S1.        Electronically signed by: Johnson Cohn MD  Date:                                            10/01/2020  Time:                                           07:54     Narrative & Impression     EXAMINATION:  XR HIPS BILATERAL 2 VIEW INCL AP PELVIS     CLINICAL HISTORY:  Low back pain     TECHNIQUE:  AP view of the pelvis and frogleg lateral views of both hips were performed.     COMPARISON:  None.      FINDINGS:  Mild DJD.  No fracture or dislocation.  No bone destruction identified.     Impression:     See above        Electronically signed by: Jasbir Severino MD  Date:                                            07/01/2020  Time:                                           14:02     Narrative & Impression     EXAMINATION:  XR LUMBAR SPINE AP AND LATERAL     CLINICAL HISTORY:  Back pain or radiculopathy, > 6 wks;Dorsalgia, unspecified     TECHNIQUE:  AP, lateral and spot images were performed of the lumbar spine.     COMPARISON:  N 05/15/2018 one     FINDINGS:  Mild DJD.  There is a grade 1 L4/L5 anterolisthesis noted.  The lumbosacral disc space is narrowed and slight narrowing of the L4/L5 disc space also identified.  No fracture or dislocation.  No bone destruction identified     Impression:     See above        Electronically signed by: Jasbir Severino MD  Date:                                            07/01/2020  Time:                                           13:59             Past Medical History:   Diagnosis Date    Glaucoma      Past Surgical History:   Procedure Laterality Date    EYE SURGERY      glaucoma     Social History     Socioeconomic History    Marital status: Single   Tobacco Use    Smoking status: Never    Smokeless tobacco: Never   Substance and Sexual Activity    Alcohol use: Yes     Comment: limited    Drug use: No     Family History   Problem Relation Age of Onset    Hypertension Mother     Hypertension Brother     Kidney disease Brother     Heart disease Brother     Cancer Brother         lung    Hypertension Brother        Review of patient's allergies indicates:  No Known Allergies    Current Outpatient Medications   Medication Sig    brimonidine 0.2% (ALPHAGAN) 0.2 % Drop INSTILL 1 DROP INTO EACH EYE THREE TIMES DAILY    BRIMONIDINE TARTRATE/TIMOLOL (COMBIGAN OPHT) Apply to eye.    dorzolamide-timolol 2-0.5% (COSOPT) 22.3-6.8 mg/mL ophthalmic solution INSTILL 1 DROP INTO EACH EYE TWICE  "DAILY    dorzolamide-timolol 2-0.5% (COSOPT) 22.3-6.8 mg/mL ophthalmic solution Apply 1 drop to eye 2 (two) times daily.    latanoprost 0.005 % ophthalmic solution Place 1 drop into both eyes nightly.    erythromycin (ROMYCIN) ophthalmic ointment Place a 1/2 inch ribbon of ointment into the lower eyelid three times a day for five days. (Patient not taking: Reported on 10/31/2022)    tiZANidine (ZANAFLEX) 4 MG tablet Take 1 tablet (4 mg total) by mouth every 6 (six) hours as needed. (Patient not taking: Reported on 8/29/2023)     No current facility-administered medications for this visit.       REVIEW OF SYSTEMS:    GENERAL:  No weight loss, malaise or fevers.  HEENT:  Negative for frequent or significant headaches.   NECK:  Negative for lumps, goiter, pain and significant neck swelling.  RESPIRATORY:  Negative for cough, wheezing or shortness of breath.  CARDIOVASCULAR:  Negative for chest pain, leg swelling or palpitations.  GI:  Negative for abdominal discomfort, blood in stools or black stools or change in bowel habits.  MUSCULOSKELETAL:  See HPI. +back pain  SKIN:  Negative for lesions, rash, and itching.  PSYCH:  Negative for sleep disturbance, mood disorder and recent psychosocial stressors.  HEMATOLOGY/LYMPHOLOGY:  Negative for prolonged bleeding, bruising easily or swollen nodes.  NEURO:   No history of headaches, syncope, paralysis, seizures or tremors.  All other reviewed and negative other than HPI.    OBJECTIVE:    BP (!) 143/98   Pulse 64   Temp 98.8 °F (37.1 °C) (Oral)   Resp 18   Ht 6' 2" (1.88 m)   Wt 85 kg (187 lb 6.3 oz)   SpO2 100%   BMI 24.06 kg/m²     PHYSICAL EXAMINATION:    General appearance: Well appearing, in no acute distress, alert and oriented x3.  Psych:  Mood and affect appropriate.  Skin: Skin color, texture, turgor normal, no rashes or lesions, in both upper and lower body.  Head/face:  Normocephalic, atraumatic. No palpable lymph nodes.  Neck: No pain to palpation over the " cervical paraspinous muscles. Spurling Negative. No pain with neck flexion, extension, or lateral flexion.   Cor: RRR  Pulm: CTA  GI:  Soft and non-tender.  Back: Straight leg raising in the sitting and supine positions is negative to radicular pain. No pain to palpation over the spine or costovertebral angles. Normal range of motion without pain reproduction.  Extremities: Peripheral joint ROM is full and pain free without obvious instability or laxity in all four extremities. No deformities, edema, or skin discoloration. Good capillary refill.  Musculoskeletal: Shoulder, hip, sacroiliac and knee provocative maneuvers are negative. Bilateral upper and lower extremity strength is normal and symmetric.  No atrophy or tone abnormalities are noted.  Neuro: Reflexes 2+ in bilateral lower extremities. Plantar response are downgoing. Pt has TTP along lumbar Paraspinous muscles. +facet loading bilaterally. Pain on flexion of spine. No pain on extension. Straight leg negative for radicular symptoms.   Gait: normal.     ASSESSMENT: 58 y.o. year old male with left lower extremity pain consistent with lumbo sacral radiculopathy predominantly in the L5 and S1 distribution. MRI of the lumbar spine shows mild bilateral neural foraminal narrowing at L4/% and mild R foraminal narrowing at L5/S1. EMG/NCS shows evidence of and left L5/S1 radiculopathy. He has tried multiple OTC medications with no significant relief.     1. DDD (degenerative disc disease), lumbar        2. Lumbar facet arthropathy  Ambulatory referral/consult to Pain Clinic          PLAN:   - Prior records reviewed and imaging interpreted. New MRI showing progression of DDD in lumbar spine. Pt also has significant facet arthropathy in lower lumbar region along with Modic changes in L5/S1 region.   - I have stressed the importance of physical activity and a home exercise plan to help with pain and improve health.  - Order placed for outpatient physical Therapy.   -  Discussed treatment options going forward: PT vs DANIELLE vs MBB. Pt would like to start with conservative therapy and starting with PT.  - RTC 2 months for f/u  - Counseled patient regarding the importance of physical therapy.    The above plan and management options were discussed at length with patient. Patient is in agreement with the above and verbalized understanding. It will be communicated with the referring physician via electronic record, fax, or mail.    Yoly Dupont MD PGY-3  [facility]   08/29/2023       I have reviewed and concur with the resident's history, physical, assessment, and plan.  I have personally interviewed and examined the patient at bedside.  See below addendum for my evaluation and additional findings.    Beka Mann MD

## 2023-08-30 ENCOUNTER — PATIENT OUTREACH (OUTPATIENT)
Dept: ADMINISTRATIVE | Facility: HOSPITAL | Age: 58
End: 2023-08-30
Payer: COMMERCIAL

## 2023-08-30 NOTE — PROGRESS NOTES
JOAQUINA inbasket msg received on 8/28/2023 from Dr. Mann's office requesting a Colonoscopy referral for [facility]. Upon reviewing Mr. Capellan's chart he is scheduled to completed his colonoscopy on 10/17/2023

## 2023-09-20 ENCOUNTER — PATIENT MESSAGE (OUTPATIENT)
Dept: ADMINISTRATIVE | Facility: HOSPITAL | Age: 58
End: 2023-09-20
Payer: COMMERCIAL

## 2023-10-10 ENCOUNTER — TELEPHONE (OUTPATIENT)
Dept: ENDOSCOPY | Facility: HOSPITAL | Age: 58
End: 2023-10-10
Payer: COMMERCIAL

## 2023-10-16 ENCOUNTER — TELEPHONE (OUTPATIENT)
Dept: ENDOSCOPY | Facility: HOSPITAL | Age: 58
End: 2023-10-16
Payer: COMMERCIAL

## 2023-10-17 ENCOUNTER — ANESTHESIA (OUTPATIENT)
Dept: ENDOSCOPY | Facility: HOSPITAL | Age: 58
End: 2023-10-17
Payer: COMMERCIAL

## 2023-10-17 ENCOUNTER — HOSPITAL ENCOUNTER (OUTPATIENT)
Facility: HOSPITAL | Age: 58
Discharge: HOME | End: 2023-10-17
Attending: COLON & RECTAL SURGERY | Admitting: COLON & RECTAL SURGERY
Payer: COMMERCIAL

## 2023-10-17 ENCOUNTER — ANESTHESIA EVENT (OUTPATIENT)
Dept: ENDOSCOPY | Facility: HOSPITAL | Age: 58
End: 2023-10-17
Payer: COMMERCIAL

## 2023-10-17 VITALS
DIASTOLIC BLOOD PRESSURE: 90 MMHG | HEART RATE: 77 BPM | OXYGEN SATURATION: 98 % | BODY MASS INDEX: 24.38 KG/M2 | HEIGHT: 74 IN | WEIGHT: 190 LBS | SYSTOLIC BLOOD PRESSURE: 156 MMHG | RESPIRATION RATE: 16 BRPM | TEMPERATURE: 97 F

## 2023-10-17 DIAGNOSIS — Z12.11 SCREEN FOR COLON CANCER: ICD-10-CM

## 2023-10-17 PROCEDURE — 63600175 PHARM REV CODE 636 W HCPCS: Performed by: NURSE ANESTHETIST, CERTIFIED REGISTERED

## 2023-10-17 PROCEDURE — 37000008 HC ANESTHESIA 1ST 15 MINUTES: Performed by: COLON & RECTAL SURGERY

## 2023-10-17 PROCEDURE — 37000009 HC ANESTHESIA EA ADD 15 MINS: Performed by: COLON & RECTAL SURGERY

## 2023-10-17 PROCEDURE — G0121 COLON CA SCRN NOT HI RSK IND: HCPCS | Mod: ,,, | Performed by: COLON & RECTAL SURGERY

## 2023-10-17 PROCEDURE — 25000003 PHARM REV CODE 250: Performed by: NURSE ANESTHETIST, CERTIFIED REGISTERED

## 2023-10-17 PROCEDURE — G0121 COLON CA SCRN NOT HI RSK IND: HCPCS | Performed by: COLON & RECTAL SURGERY

## 2023-10-17 PROCEDURE — E9220 PRA ENDO ANESTHESIA: ICD-10-PCS | Mod: ,,, | Performed by: NURSE ANESTHETIST, CERTIFIED REGISTERED

## 2023-10-17 PROCEDURE — G0121 COLON CA SCRN NOT HI RSK IND: ICD-10-PCS | Mod: ,,, | Performed by: COLON & RECTAL SURGERY

## 2023-10-17 PROCEDURE — E9220 PRA ENDO ANESTHESIA: HCPCS | Mod: ,,, | Performed by: NURSE ANESTHETIST, CERTIFIED REGISTERED

## 2023-10-17 RX ORDER — PROPOFOL 10 MG/ML
VIAL (ML) INTRAVENOUS
Status: DISCONTINUED | OUTPATIENT
Start: 2023-10-17 | End: 2023-10-17

## 2023-10-17 RX ORDER — LIDOCAINE HYDROCHLORIDE 20 MG/ML
INJECTION INTRAVENOUS
Status: DISCONTINUED | OUTPATIENT
Start: 2023-10-17 | End: 2023-10-17

## 2023-10-17 RX ORDER — PROPOFOL 10 MG/ML
VIAL (ML) INTRAVENOUS CONTINUOUS PRN
Status: DISCONTINUED | OUTPATIENT
Start: 2023-10-17 | End: 2023-10-17

## 2023-10-17 RX ORDER — SODIUM CHLORIDE 0.9 % (FLUSH) 0.9 %
10 SYRINGE (ML) INJECTION
Status: DISCONTINUED | OUTPATIENT
Start: 2023-10-17 | End: 2023-10-17 | Stop reason: HOSPADM

## 2023-10-17 RX ADMIN — PROPOFOL 70 MG: 10 INJECTION, EMULSION INTRAVENOUS at 02:10

## 2023-10-17 RX ADMIN — PROPOFOL 200 MCG/KG/MIN: 10 INJECTION, EMULSION INTRAVENOUS at 02:10

## 2023-10-17 RX ADMIN — SODIUM CHLORIDE: 0.9 INJECTION, SOLUTION INTRAVENOUS at 02:10

## 2023-10-17 RX ADMIN — LIDOCAINE HYDROCHLORIDE 50 MG: 20 INJECTION INTRAVENOUS at 02:10

## 2023-10-17 NOTE — ANESTHESIA PREPROCEDURE EVALUATION
10/17/2023  Tobias Capellan is a 58 y.o., male.      Pre-op Assessment    I have reviewed the Patient Summary Reports.     I have reviewed the Nursing Notes. I have reviewed the NPO Status.   I have reviewed the Medications.     Review of Systems  Anesthesia Hx:  No problems with previous Anesthesia  History of prior surgery of interest to airway management or planning: Previous anesthesia: General  Denies Personal Hx of Anesthesia complications.   Cardiovascular:   Hypertension    Pulmonary:  Pulmonary Normal    Renal/:  Renal/ Normal     Hepatic/GI:  Hepatic/GI Normal    Neurological:  Neurology Normal    Endocrine:  Endocrine Normal      Patient Active Problem List   Diagnosis    Anatomical narrow angle glaucoma    Essential hypertension    POAG (primary open-angle glaucoma)    Traumatic iritis    Decreased range of motion of lumbar spine    Decreased strength of trunk and back     Past Medical History:   Diagnosis Date    Glaucoma      Past Surgical History:   Procedure Laterality Date    EYE SURGERY      glaucoma         Physical Exam  General: Well nourished, Cooperative and Alert    Airway:  Mallampati: II   Mouth Opening: Normal  TM Distance: Normal  Tongue: Normal  Neck ROM: Normal ROM    Dental:  Intact    Chest/Lungs:  Clear to auscultation, Normal Respiratory Rate    Heart:  Rate: Normal  Rhythm: Regular Rhythm  Sounds: Normal        Anesthesia Plan  Type of Anesthesia, risks & benefits discussed:    Anesthesia Type: Gen Natural Airway, Gen ETT, MAC  Intra-op Monitoring Plan: Standard ASA Monitors  Post Op Pain Control Plan: multimodal analgesia  Induction:  IV  Airway Plan: Direct, Post-Induction  Informed Consent: Informed consent signed with the Patient and all parties understand the risks and agree with anesthesia plan.  All questions answered.   ASA Score: 2  Day of Surgery Review  of History & Physical: H&P Update referred to the surgeon/provider.    Ready For Surgery From Anesthesia Perspective.     .

## 2023-10-17 NOTE — TRANSFER OF CARE
"Anesthesia Transfer of Care Note    Patient: Tobias Capellan    Procedure(s) Performed: Procedure(s) (LRB):  COLONOSCOPY (N/A)    Patient location: PACU    Anesthesia Type: general    Transport from OR: Transported from OR on room air with adequate spontaneous ventilation    Post pain: adequate analgesia    Post assessment: no apparent anesthetic complications and tolerated procedure well    Post vital signs: stable    Level of consciousness: responds to stimulation    Nausea/Vomiting: no vomiting    Complications: none    Transfer of care protocol was followed      Last vitals:   Visit Vitals  BP (!) 160/102 (BP Location: Left arm, Patient Position: Lying)   Pulse 81   Temp 36.8 °C (98.2 °F) (Temporal)   Resp 16   Ht 6' 2" (1.88 m)   Wt 86.2 kg (190 lb)   SpO2 96%   BMI 24.39 kg/m²     "

## 2023-10-17 NOTE — PROVATION PATIENT INSTRUCTIONS
Discharge Summary/Instructions after an Endoscopic Procedure  Patient Name: Tobias Capellan  Patient MRN: [id number]  Patient YOB: 1965 Tuesday, October 17, 2023  Gigi Saenz MD  Dear patient,  As a result of recent federal legislation (The Federal Cures Act), you may   receive lab or pathology results from your procedure in your [facility]   account before your physician is able to contact you. Your physician or   their representative will relay the results to you with their   recommendations at their soonest availability.  Thank you,  RESTRICTIONS:  During your procedure today, you received medications for sedation.  These   medications may affect your judgment, balance and coordination.  Therefore,   for 24 hours, you have the following restrictions:   - DO NOT drive a car, operate machinery, make legal/financial decisions,   sign important papers or drink alcohol.    ACTIVITY:  Today: no heavy lifting, straining or running due to procedural   sedation/anesthesia.  The following day: return to full activity including work.  DIET:  Eat and drink normally unless instructed otherwise.     TREATMENT FOR COMMON SIDE EFFECTS:  - Mild abdominal pain, nausea, belching, bloating or excessive gas:  rest,   eat lightly and use a heating pad.  - Sore Throat: treat with throat lozenges and/or gargle with warm salt   water.  - Because air was used during the procedure, expelling large amounts of air   from your rectum or belching is normal.  - If a bowel prep was taken, you may not have a bowel movement for 1-3 days.    This is normal.  SYMPTOMS TO WATCH FOR AND REPORT TO YOUR PHYSICIAN:  1. Abdominal pain or bloating, other than gas cramps.  2. Chest pain.  3. Back pain.  4. Signs of infection such as: chills or fever occurring within 24 hours   after the procedure.  5. Rectal bleeding, which would show as bright red, maroon, or black stools.   (A tablespoon of blood from the rectum is not serious, especially if    hemorrhoids are present.)  6. Vomiting.  7. Weakness or dizziness.  GO DIRECTLY TO THE NEAREST EMERGENCY ROOM IF YOU HAVE ANY OF THE FOLLOWING:      Difficulty breathing              Chills and/or fever over 101 F   Persistent vomiting and/or vomiting blood   Severe abdominal pain   Severe chest pain   Black, tarry stools   Bleeding- more than one tablespoon   Any other symptom or condition that you feel may need urgent attention  Your doctor recommends these additional instructions:  If any biopsies were taken, your doctors clinic will contact you in 1 to 2   weeks with any results.  - Discharge patient to home (ambulatory).   - Patient has a contact number available for emergencies.  The signs and   symptoms of potential delayed complications were discussed with the   patient.  Return to normal activities tomorrow.  Written discharge   instructions were provided to the patient.   - Resume previous diet.   - Continue present medications.   - Repeat colonoscopy in 10 years for screening purposes.  For questions, problems or results please call your physician - Gigi Saenz MD at Work:  [phone].  [facility] [place], EMERGENCY ROOM PHONE NUMBER: [phone]  IF A COMPLICATION OR EMERGENCY SITUATION ARISES AND YOU ARE UNABLE TO REACH   YOUR PHYSICIAN - GO DIRECTLY TO THE EMERGENCY ROOM.  Gigi Saenz MD  10/17/2023 2:44:06 PM  This report has been verified and signed electronically.  Dear patient,  As a result of recent federal legislation (The Federal Cures Act), you may   receive lab or pathology results from your procedure in your [facility]   account before your physician is able to contact you. Your physician or   their representative will relay the results to you with their   recommendations at their soonest availability.  Thank you,  PROVATION

## 2023-10-17 NOTE — H&P
COLONOSCOPY HISTORY AND PE    Procedure : Colonoscopy      INDICATIONS: asymptomatic screening exam      Past Medical History:   Diagnosis Date    Glaucoma        Past Surgical History:   Procedure Laterality Date    EYE SURGERY      glaucoma       Review of patient's allergies indicates:  No Known Allergies    No current facility-administered medications on file prior to encounter.     Current Outpatient Medications on File Prior to Encounter   Medication Sig Dispense Refill    brimonidine 0.2% (ALPHAGAN) 0.2 % Drop INSTILL 1 DROP INTO EACH EYE THREE TIMES DAILY      BRIMONIDINE TARTRATE/TIMOLOL (COMBIGAN OPHT) Apply to eye.      dorzolamide-timolol 2-0.5% (COSOPT) 22.3-6.8 mg/mL ophthalmic solution INSTILL 1 DROP INTO EACH EYE TWICE DAILY      dorzolamide-timolol 2-0.5% (COSOPT) 22.3-6.8 mg/mL ophthalmic solution Apply 1 drop to eye 2 (two) times daily.      erythromycin (ROMYCIN) ophthalmic ointment Place a 1/2 inch ribbon of ointment into the lower eyelid three times a day for five days. (Patient not taking: Reported on 10/31/2022) 1 Tube 0    latanoprost 0.005 % ophthalmic solution Place 1 drop into both eyes nightly.      tiZANidine (ZANAFLEX) 4 MG tablet Take 1 tablet (4 mg total) by mouth every 6 (six) hours as needed. (Patient not taking: Reported on 8/29/2023) 30 tablet 1       Family History   Problem Relation Age of Onset    Hypertension Mother     Hypertension Brother     Kidney disease Brother     Heart disease Brother     Cancer Brother         lung    Hypertension Brother        Social History     Socioeconomic History    Marital status: Single   Tobacco Use    Smoking status: Never    Smokeless tobacco: Never   Substance and Sexual Activity    Alcohol use: Yes     Comment: limited    Drug use: No       Review of Systems -    Respiratory : no cough, shortness of breath, or wheezing  Cardiovascular  no chest pain or dyspnea on exertion  Gastrointestinal no abdominal pain, change in bowel habits, or  black or bloody stools  Musculoskeletal no deformities, swelling  Neurological no TIA or stroke symptoms        Physical Exam:  General: NAD  AT NC EOMI  Mallampati Score   Neck supple, trachea midline  Lungs: nl excursions, no retractions.  Breathing comfortably  Abdomen ND soft NT.  No masses  Extremities: No CCE.      ASA:  II    PLAN  COLONOSCOPY.  The details of the procedure, the possible need for biopsy or polypectomy and the potential risks including bleeding, perforation, missed polyps were discussed in detail.

## 2023-10-18 NOTE — ANESTHESIA POSTPROCEDURE EVALUATION
Anesthesia Post Evaluation    Patient: Tobias Capellan    Procedure(s) Performed: Procedure(s) (LRB):  COLONOSCOPY (N/A)    Final Anesthesia Type: general      Patient location during evaluation: GI PACU  Patient participation: Yes- Able to Participate  Level of consciousness: awake and alert and oriented  Post-procedure vital signs: reviewed and stable  Pain management: adequate  Airway patency: patent    PONV status at discharge: No PONV  Anesthetic complications: no      Cardiovascular status: hemodynamically stable  Respiratory status: unassisted, spontaneous ventilation and room air  Hydration status: euvolemic  Follow-up not needed.          Vitals Value Taken Time   /90 10/17/23 1533   Temp 36.1 °C (97 °F) 10/17/23 1449   Pulse 77 10/17/23 1533   Resp 16 10/17/23 1533   SpO2 98 % 10/17/23 1533         Event Time   Out of Recovery 15:44:54         Pain/Karen Score: Karen Score: 8 (10/17/2023  2:51 PM)

## 2023-11-29 ENCOUNTER — OFFICE VISIT (OUTPATIENT)
Dept: PODIATRY | Facility: CLINIC | Age: 58
End: 2023-11-29
Payer: COMMERCIAL

## 2023-11-29 VITALS
HEART RATE: 73 BPM | BODY MASS INDEX: 24.38 KG/M2 | WEIGHT: 190 LBS | SYSTOLIC BLOOD PRESSURE: 208 MMHG | DIASTOLIC BLOOD PRESSURE: 97 MMHG | HEIGHT: 74 IN

## 2023-11-29 DIAGNOSIS — M77.9 CAPSULITIS: ICD-10-CM

## 2023-11-29 DIAGNOSIS — M79.672 FOOT PAIN, LEFT: ICD-10-CM

## 2023-11-29 DIAGNOSIS — M24.573 EQUINUS CONTRACTURE OF ANKLE: ICD-10-CM

## 2023-11-29 DIAGNOSIS — Q82.8 POROKERATOSIS: Primary | ICD-10-CM

## 2023-11-29 PROCEDURE — 3008F BODY MASS INDEX DOCD: CPT | Mod: CPTII,S$GLB,, | Performed by: PODIATRIST

## 2023-11-29 PROCEDURE — 1159F MED LIST DOCD IN RCRD: CPT | Mod: CPTII,S$GLB,, | Performed by: PODIATRIST

## 2023-11-29 PROCEDURE — 3077F SYST BP >= 140 MM HG: CPT | Mod: CPTII,S$GLB,, | Performed by: PODIATRIST

## 2023-11-29 PROCEDURE — 99203 PR OFFICE/OUTPT VISIT, NEW, LEVL III, 30-44 MIN: ICD-10-PCS | Mod: S$GLB,,, | Performed by: PODIATRIST

## 2023-11-29 PROCEDURE — 3080F PR MOST RECENT DIASTOLIC BLOOD PRESSURE >= 90 MM HG: ICD-10-PCS | Mod: CPTII,S$GLB,, | Performed by: PODIATRIST

## 2023-11-29 PROCEDURE — 99999 PR PBB SHADOW E&M-EST. PATIENT-LVL III: CPT | Mod: PBBFAC,,, | Performed by: PODIATRIST

## 2023-11-29 PROCEDURE — 99999 PR PBB SHADOW E&M-EST. PATIENT-LVL III: ICD-10-PCS | Mod: PBBFAC,,, | Performed by: PODIATRIST

## 2023-11-29 PROCEDURE — 3077F PR MOST RECENT SYSTOLIC BLOOD PRESSURE >= 140 MM HG: ICD-10-PCS | Mod: CPTII,S$GLB,, | Performed by: PODIATRIST

## 2023-11-29 PROCEDURE — 99203 OFFICE O/P NEW LOW 30 MIN: CPT | Mod: S$GLB,,, | Performed by: PODIATRIST

## 2023-11-29 PROCEDURE — 1159F PR MEDICATION LIST DOCUMENTED IN MEDICAL RECORD: ICD-10-PCS | Mod: CPTII,S$GLB,, | Performed by: PODIATRIST

## 2023-11-29 PROCEDURE — 3080F DIAST BP >= 90 MM HG: CPT | Mod: CPTII,S$GLB,, | Performed by: PODIATRIST

## 2023-11-29 PROCEDURE — 3008F PR BODY MASS INDEX (BMI) DOCUMENTED: ICD-10-PCS | Mod: CPTII,S$GLB,, | Performed by: PODIATRIST

## 2023-11-29 RX ORDER — UREA 40 %
CREAM (GRAM) TOPICAL DAILY
Qty: 85 G | Refills: 11 | Status: SHIPPED | OUTPATIENT
Start: 2023-11-29

## 2023-11-29 NOTE — PROGRESS NOTES
Subjective:      Patient ID: Tobias Capellan is a 58 y.o. male.    Chief Complaint: Foot Problem (Stepped on nail with the right foot )    Sharp throbbing pain with skin lesion the bottom of the left forefoot.  Gradual onset, worsening over the past few months.  Aggravated by stepping on a nail about a month ago and then again 4 days ago.  Denies trauma and surgery besides that.  Aggravated with increased weight-bearing prolonged standing some shoes.  No prior medical treatment.  No self-treatment.  Tetanus status up-to-date    Review of Systems   Constitutional: Negative for chills, diaphoresis, fever, malaise/fatigue and night sweats.   Cardiovascular:  Negative for claudication, cyanosis, leg swelling and syncope.   Skin:  Positive for suspicious lesions. Negative for color change, dry skin, nail changes, rash and unusual hair distribution.   Musculoskeletal:  Positive for joint pain. Negative for falls, joint swelling, muscle cramps, muscle weakness and stiffness.   Gastrointestinal:  Negative for constipation, diarrhea, nausea and vomiting.   Neurological:  Negative for brief paralysis, disturbances in coordination, focal weakness, numbness, paresthesias, sensory change and tremors.         Objective:      Physical Exam  Constitutional:       General: He is not in acute distress.     Appearance: He is well-developed. He is not diaphoretic.   Cardiovascular:      Pulses:           Popliteal pulses are 2+ on the right side and 2+ on the left side.        Dorsalis pedis pulses are 2+ on the right side and 2+ on the left side.        Posterior tibial pulses are 2+ on the right side and 2+ on the left side.      Comments: Capillary refill 3 seconds all toes/distal feet, all toes/both feet warm to touch.      Negative lymphadenopathy bilateral popliteal fossa and tarsal tunnel.      Negavie lower extremity edema bilateral.    Musculoskeletal:      Right ankle: No swelling, deformity, ecchymosis or lacerations. Normal  range of motion. Normal pulse.      Right Achilles Tendon: Normal. No defects. Adamson's test negative.      Comments: Pain to palpation inferior mtpj 4 left without evidence of trauma or infection.    Ankle dorsiflexion decreased at <10 degrees bilateral with moderate increase with knee flexion bilateral.       Lymphadenopathy:      Lower Body: No right inguinal adenopathy. No left inguinal adenopathy.      Comments: Negative lymphadenopathy bilateral popliteal fossa and tarsal tunnel.    Negative lymphangitic streaking bilateral feet/ankles/legs.   Skin:     General: Skin is warm and dry.      Capillary Refill: Capillary refill takes 2 to 3 seconds.      Coloration: Skin is not pale.      Findings: No abrasion, bruising, burn, ecchymosis, erythema, laceration, lesion or rash.      Nails: There is no clubbing.      Comments: Focal hyperkeratotic lesion consisting entirely of hyperkeratotic tissue with deep central core without open skin, drainage, pus, fluctuance, malodor, or signs of infection plantar left 4th mtpj    Otherwise, Skin is normal age and health appropriate color, turgor, texture, and temperature bilateral lower extremities without ulceration, hyperpigmentation, discoloration, masses nodules or cords palpated.  No ecchymosis, erythema, edema, or cardinal signs of infection bilateral lower extremities.         Neurological:      Mental Status: He is alert and oriented to person, place, and time.      Sensory: No sensory deficit.      Motor: No tremor, atrophy or abnormal muscle tone.      Gait: Gait normal.      Deep Tendon Reflexes:      Reflex Scores:       Patellar reflexes are 2+ on the right side and 2+ on the left side.       Achilles reflexes are 2+ on the right side and 2+ on the left side.     Comments:   Negative moulder sign/click bilateral all intermetatarsal spaces.    Negative tinel sign to percussion sural, superficial peroneal, deep peroneal, saphenous, and posterior tibial nerves  right and left ankles and feet.     Psychiatric:         Behavior: Behavior is cooperative.           Assessment:       Encounter Diagnoses   Name Primary?    Porokeratosis Yes    Capsulitis     Foot pain, left     Equinus contracture of ankle          Plan:       Tobias was seen today for foot problem.    Diagnoses and all orders for this visit:    Porokeratosis    Capsulitis    Foot pain, left    Equinus contracture of ankle    Other orders  -     urea (CARMOL) 40 % Crea; Apply topically once daily.      I counseled the patient on his conditions, their implications and medical management.        Patient will stretch the tendo achilles complex three times daily as demonstrated in the office.  Literature was dispensed illustrating proper stretching technique.    Patient will obtain over the counter arch supports and wear them in shoes whenever possible.  Athletic shoes intended for walking or running are usually best.    The patient was advised that NSAID-type medications have two very important potential side effects: gastrointestinal irritation including hemorrhage and renal injuries. He was asked to take the medication with food and to stop if he experiences any GI upset. I asked him to call for vomiting, abdominal pain or black/bloody stools. The patient expresses understanding of these issues and questions were answered.    Discussed conservative treatment with shoes of adequate dimensions, material, and style to alleviate symptoms and delay or prevent surgical intervention.    Urea              Follow up if symptoms worsen or fail to improve.

## 2023-12-11 ENCOUNTER — HOSPITAL ENCOUNTER (EMERGENCY)
Facility: HOSPITAL | Age: 58
Discharge: HOME | End: 2023-12-11
Attending: EMERGENCY MEDICINE
Payer: COMMERCIAL

## 2023-12-11 VITALS
BODY MASS INDEX: 24.52 KG/M2 | HEART RATE: 128 BPM | RESPIRATION RATE: 18 BRPM | WEIGHT: 191 LBS | OXYGEN SATURATION: 99 % | DIASTOLIC BLOOD PRESSURE: 99 MMHG | SYSTOLIC BLOOD PRESSURE: 177 MMHG | TEMPERATURE: 99 F

## 2023-12-11 DIAGNOSIS — U07.1 COVID-19 VIRUS DETECTED: ICD-10-CM

## 2023-12-11 DIAGNOSIS — U07.1 COVID: Primary | ICD-10-CM

## 2023-12-11 LAB
INFLUENZA A, MOLECULAR: NEGATIVE
INFLUENZA B, MOLECULAR: NEGATIVE
SARS-COV-2 RDRP RESP QL NAA+PROBE: POSITIVE
SPECIMEN SOURCE: NORMAL

## 2023-12-11 PROCEDURE — 63600175 PHARM REV CODE 636 W HCPCS

## 2023-12-11 PROCEDURE — 87502 INFLUENZA DNA AMP PROBE: CPT

## 2023-12-11 PROCEDURE — 25000003 PHARM REV CODE 250

## 2023-12-11 PROCEDURE — 99284 EMERGENCY DEPT VISIT MOD MDM: CPT

## 2023-12-11 PROCEDURE — U0002 COVID-19 LAB TEST NON-CDC: HCPCS

## 2023-12-11 PROCEDURE — 96372 THER/PROPH/DIAG INJ SC/IM: CPT

## 2023-12-11 RX ORDER — CETIRIZINE HYDROCHLORIDE 10 MG/1
10 TABLET ORAL DAILY
Qty: 14 TABLET | Refills: 0 | Status: SHIPPED | OUTPATIENT
Start: 2023-12-11 | End: 2023-12-25

## 2023-12-11 RX ORDER — METHOCARBAMOL 500 MG/1
500 TABLET, FILM COATED ORAL 4 TIMES DAILY
Qty: 20 TABLET | Refills: 0 | Status: SHIPPED | OUTPATIENT
Start: 2023-12-11 | End: 2023-12-16

## 2023-12-11 RX ORDER — GUAIFENESIN 600 MG/1
1200 TABLET, EXTENDED RELEASE ORAL 2 TIMES DAILY
Qty: 20 TABLET | Refills: 0 | Status: SHIPPED | OUTPATIENT
Start: 2023-12-11 | End: 2023-12-16

## 2023-12-11 RX ORDER — LIDOCAINE 50 MG/G
1 PATCH TOPICAL
Status: DISCONTINUED | OUTPATIENT
Start: 2023-12-11 | End: 2023-12-11 | Stop reason: HOSPADM

## 2023-12-11 RX ORDER — KETOROLAC TROMETHAMINE 30 MG/ML
30 INJECTION, SOLUTION INTRAMUSCULAR; INTRAVENOUS
Status: COMPLETED | OUTPATIENT
Start: 2023-12-11 | End: 2023-12-11

## 2023-12-11 RX ORDER — BENZONATATE 100 MG/1
100 CAPSULE ORAL 2 TIMES DAILY
Qty: 10 CAPSULE | Refills: 0 | Status: SHIPPED | OUTPATIENT
Start: 2023-12-11 | End: 2023-12-16

## 2023-12-11 RX ORDER — ACETAMINOPHEN 500 MG
1000 TABLET ORAL
Status: COMPLETED | OUTPATIENT
Start: 2023-12-11 | End: 2023-12-11

## 2023-12-11 RX ORDER — AZELASTINE 1 MG/ML
1 SPRAY, METERED NASAL 2 TIMES DAILY
Qty: 30 ML | Refills: 0 | Status: SHIPPED | OUTPATIENT
Start: 2023-12-11 | End: 2024-12-10

## 2023-12-11 RX ADMIN — ACETAMINOPHEN 1000 MG: 500 TABLET ORAL at 03:12

## 2023-12-11 RX ADMIN — LIDOCAINE 1 PATCH: 50 PATCH CUTANEOUS at 03:12

## 2023-12-11 RX ADMIN — KETOROLAC TROMETHAMINE 30 MG: 30 INJECTION, SOLUTION INTRAMUSCULAR; INTRAVENOUS at 03:12

## 2023-12-11 NOTE — ED PROVIDER NOTES
Encounter Date: 12/11/2023       History     Chief Complaint   Patient presents with    Fever     Pt reports he has fever that started yesterday with a cough and body aches. Pt has also had back pain for a few months that worsened since last night.      Patient is a 58 y.o. male who presents with fever, cough, body aches that began last night. Patient does not reports close contacts with similar symptoms. Patient has not taken any medication for symptom relief.Patient has history of chronic lower back pain and reports that his back pain has been worse since last night. Denies any known injury to the back but states that coughing exacerbates the pain.  Denies saddle anesthesia, loss of bowel or bladder control. Denies headache, chest pain, shortness of breath, wheezing, stridor, drooling, NVD, abdominal pain, constipation, urinary problems, joint problems, rashes, or any other complaints at this time.      The history is provided by the patient.     Review of patient's allergies indicates:  No Known Allergies  Past Medical History:   Diagnosis Date    Glaucoma      Past Surgical History:   Procedure Laterality Date    COLONOSCOPY N/A 10/17/2023    Procedure: COLONOSCOPY;  Surgeon: MARILUZ Saenz MD;  Location: [facility] ([address] [place] [address]);  Service: Endoscopy;  Laterality: N/A;  8/8 ref. by Mando Rice MD, PEG, instr. to portal-st  10/10-lvm for precall-MS  10/16-precall complete-KPvt    EYE SURGERY      glaucoma     Family History   Problem Relation Age of Onset    Hypertension Mother     Hypertension Brother     Kidney disease Brother     Heart disease Brother     Cancer Brother         lung    Hypertension Brother      Social History     Tobacco Use    Smoking status: Never    Smokeless tobacco: Never   Substance Use Topics    Alcohol use: Yes     Comment: limited    Drug use: No     Review of Systems   Constitutional:  Negative for chills.   HENT:  Positive for congestion, postnasal drip, rhinorrhea and sore throat.  Negative for ear discharge, ear pain, facial swelling, trouble swallowing and voice change.    Respiratory:  Positive for cough. Negative for shortness of breath and wheezing.    Cardiovascular:  Negative for chest pain.   Gastrointestinal:  Negative for abdominal distention, abdominal pain, diarrhea, nausea and vomiting.   Genitourinary:  Negative for dysuria, flank pain, frequency and hematuria.   Musculoskeletal:  Positive for back pain. Negative for gait problem, neck pain and neck stiffness.   Skin:  Negative for rash.   Neurological:  Negative for weakness.   Hematological:  Does not bruise/bleed easily.   All other systems reviewed and are negative.      Physical Exam     Initial Vitals [12/11/23 1435]   BP Pulse Resp Temp SpO2   (!) 177/99 (!) 128 18 (!) 101.1 °F (38.4 °C) 99 %      MAP       --         Physical Exam    Vitals reviewed.  Constitutional: He appears well-developed and well-nourished.   HENT:   Head: Normocephalic and atraumatic.   Nose: Rhinorrhea present.   Mouth/Throat: Uvula is midline. No oropharyngeal exudate, posterior oropharyngeal edema or posterior oropharyngeal erythema.   Eyes: EOM are normal. Pupils are equal, round, and reactive to light.   Neck:   Normal range of motion.  Cardiovascular:  Normal rate, regular rhythm and normal heart sounds.           Pulmonary/Chest: Breath sounds normal. No respiratory distress. He has no wheezes. He has no rhonchi. He has no rales.   Abdominal: Bowel sounds are normal. He exhibits no distension. There is no abdominal tenderness. There is no rebound.   Musculoskeletal:      Cervical back: Normal and normal range of motion.      Thoracic back: Normal.      Lumbar back: Tenderness present.      Comments: Mild paraspinal tenderness noted to the lumbar spine.  No midline tenderness, bony step-offs, deformities.     Neurological: He is alert and oriented to person, place, and time.         ED Course   Procedures  Labs Reviewed   SARS-COV-2 RNA  AMPLIFICATION, QUAL - Abnormal; Notable for the following components:       Result Value    SARS-CoV-2 RNA, Amplification, Qual Positive (*)     All other components within normal limits   INFLUENZA A & B BY MOLECULAR          Imaging Results    None          Medications   LIDOcaine 5 % patch 1 patch (1 patch Transdermal Patch Applied 12/11/23 1556)   ketorolac injection 30 mg (30 mg Intramuscular Given 12/11/23 1552)   acetaminophen tablet 1,000 mg (1,000 mg Oral Given 12/11/23 1551)     Medical Decision Making  Patient is a afebrile, well-appearing appearing 58 y.o. male. VSS. Denies fever, chest pain, SOB, wheezing, difficulty swallowing, neck pain, neck stiffness.  Lower back pain without any back pain red flags. Vital signs do not suggest sepsis. Lung sounds are clear and not consistent with pneumonia. There is no neck pain or limited ROM to suggest retropharyngeal abscess or meningitis. Tolerating PO, non-toxic appearing, no hypoxia. The patient remained comfortable and stable during their visit in the ED.  Details of ED course documented in ED workup.     Differential Diagnosis includes, but is not limited to: COVID, influenza, viral URI, bacterial/viral pharyngitis    COVID test positive. Influenza test negative.    All historical, clinical, and laboratory findings reviewed. Patient with constellation of symptoms most consistent with a COVID-19. There are no concerning features on physical exam to suggest an emergent or life threatening condition or an invasive bacterial infection, including, but not limited to: bacterial otitis media/externa, sinusitis, pharyngitis, or peritonsillar abscess. No further intervention is indicated at this time. The patient is at low risk for an emergent/life threatening medical condition at this time, and I am of the belief that that it is safe to discharge the patient from the emergency department.     Patient instructed to follow up with PCP in 2-3 days for recheck of  today's complaints. Patient has been counseled regarding the need for follow-up as well as the indications to return to the emergency room should new or worrisome developments. Discharge and follow-up instructions discussed with the patient who expressed understanding and willingness to comply with recommendations.  Discussed current COVID-19 isolation requirements.  Patient discharged from the emergency department in stable condition, in no acute distress.     Amount and/or Complexity of Data Reviewed  Labs: ordered. Decision-making details documented in ED Course.    Risk  OTC drugs.  Prescription drug management.               ED Course as of 12/11/23 1703   Mon Dec 11, 2023   1636 Temp: 98.5 °F (36.9 °C) [OB]   1644 SARS-CoV-2 RNA, Amplification, Qual(!): Positive [OB]   1644 Influenza A, Molecular: Negative [OB]   1644 Influenza B, Molecular: Negative [OB]      ED Course User Index  [OB] Kimberly Wheeler PA-C                           Clinical Impression:  Final diagnoses:  [U07.1] COVID (Primary)          ED Disposition Condition    Discharge Stable          ED Prescriptions       Medication Sig Dispense Start Date End Date Auth. Provider    cetirizine (ZYRTEC) 10 MG tablet Take 1 tablet (10 mg total) by mouth once daily. for 14 days 14 tablet 12/11/2023 12/25/2023 Kimberly Wheeler PA-C    benzonatate (TESSALON) 100 MG capsule Take 1 capsule (100 mg total) by mouth 2 (two) times a day. for 5 days 10 capsule 12/11/2023 12/16/2023 Kimberly Wheeler PA-C    guaiFENesin (MUCINEX) 600 mg 12 hr tablet Take 2 tablets (1,200 mg total) by mouth 2 (two) times daily. for 5 days 20 tablet 12/11/2023 12/16/2023 Kimberly Wheeler PA-C    azelastine (ASTELIN) 137 mcg (0.1 %) nasal spray 1 spray (137 mcg total) by Nasal route 2 (two) times daily. 30 mL 12/11/2023 12/10/2024 Kimberly Wheeler PA-C    methocarbamoL (ROBAXIN) 500 MG Tab Take 1 tablet (500 mg total) by mouth 4 (four) times daily. for 5 days 20 tablet 12/11/2023 12/16/2023 Summer  LUIS MIGUEL Harris          Follow-up Information       Follow up With Specialties Details Why Contact Info    Mando Rice MD Internal Medicine In 3 days As needed, If symptoms worsen [address]  [facility] [address], [address]  [facility] [address]  [phone]               Kimberly Wheeler PA-C  12/11/23 5243

## 2023-12-11 NOTE — DISCHARGE INSTRUCTIONS
Thank you for letting me care for you today - it was nice to meet you and I hope you feel better soon. Please return to the ER if your symptoms don't improve or get worse. And be sure to follow up with your primary care provider within the next week for follow up care. [facility] will call you within 48 hours to make an appointment, or you can call [phone] to schedule.     Our goal at [facility] is to always give you outstanding care and exceptional service. You may receive a survey by mail or email in the next week about your experience in our ED. We would greatly appreciate you completing and returning the survey. Your feedback provides us with a way to recognize our staff who give very good care and it helps us learn how to improve when your experience was below our aspiration of excellence.     All the best,     Kimberly Wheeler, MPH, PA-C  Emergency Department Physician Assistant  [facility], [facility]

## 2023-12-11 NOTE — Clinical Note
"Tobias "Tom Capellan was seen and treated in our emergency department on 12/11/2023.     COVID-19 is present in our communities across the state. There is limited testing for COVID at this time, so not all patients can be tested. In this situation, your employee meets the following criteria:    Tobias Capellan has met the criteria for COVID-19 testing and has a POSITIVE result. He can return to work once they are asymptomatic for 24 hours without the use of fever reducing medications AND at least five days from the first positive result. A mask is recommended for 5 days post quarantine.     If you have any questions or concerns, or if I can be of further assistance, please do not hesitate to contact me.    Sincerely,             Kimberly Wheeler PA-C"

## 2023-12-11 NOTE — ED TRIAGE NOTES
C/o fever, cough, body aches since yesterday with chronic lower back pain. Taking OTC cold med without relief - last taken last night. + nausea, no vomiting. Denies urinary symptoms. Presents awake, alert. No distress.

## 2023-12-13 ENCOUNTER — PATIENT MESSAGE (OUTPATIENT)
Dept: RESEARCH | Facility: HOSPITAL | Age: 58
End: 2023-12-13
Payer: COMMERCIAL

## 2024-06-10 ENCOUNTER — PATIENT MESSAGE (OUTPATIENT)
Dept: INTERNAL MEDICINE | Facility: CLINIC | Age: 59
End: 2024-06-10
Payer: COMMERCIAL

## 2024-07-05 DIAGNOSIS — I10 ESSENTIAL HYPERTENSION: ICD-10-CM

## 2024-07-22 ENCOUNTER — HOSPITAL ENCOUNTER (EMERGENCY)
Facility: HOSPITAL | Age: 59
Discharge: ANOTHER HOSPITAL | End: 2024-07-22
Attending: EMERGENCY MEDICINE
Payer: COMMERCIAL

## 2024-07-22 ENCOUNTER — HOSPITAL ENCOUNTER (EMERGENCY)
Facility: HOSPITAL | Age: 59
Discharge: HOME | End: 2024-07-23
Attending: EMERGENCY MEDICINE

## 2024-07-22 VITALS
RESPIRATION RATE: 16 BRPM | HEART RATE: 57 BPM | BODY MASS INDEX: 24.03 KG/M2 | SYSTOLIC BLOOD PRESSURE: 181 MMHG | OXYGEN SATURATION: 98 % | WEIGHT: 187.25 LBS | TEMPERATURE: 98 F | HEIGHT: 74 IN | DIASTOLIC BLOOD PRESSURE: 104 MMHG

## 2024-07-22 DIAGNOSIS — H40.9 GLAUCOMA OF BOTH EYES, UNSPECIFIED GLAUCOMA TYPE: Primary | ICD-10-CM

## 2024-07-22 DIAGNOSIS — H40.10X0 OPEN-ANGLE GLAUCOMA, UNSPECIFIED GLAUCOMA STAGE, UNSPECIFIED LATERALITY, UNSPECIFIED OPEN-ANGLE GLAUCOMA TYPE: Primary | ICD-10-CM

## 2024-07-22 PROCEDURE — 25000003 PHARM REV CODE 250

## 2024-07-22 PROCEDURE — 99285 EMERGENCY DEPT VISIT HI MDM: CPT

## 2024-07-22 PROCEDURE — 99284 EMERGENCY DEPT VISIT MOD MDM: CPT | Mod: 27

## 2024-07-22 RX ORDER — PROPARACAINE HYDROCHLORIDE 5 MG/ML
1 SOLUTION/ DROPS OPHTHALMIC
Status: COMPLETED | OUTPATIENT
Start: 2024-07-22 | End: 2024-07-22

## 2024-07-22 RX ADMIN — FLUORESCEIN SODIUM 1 EACH: 1 STRIP OPHTHALMIC at 04:07

## 2024-07-22 RX ADMIN — PROPARACAINE HYDROCHLORIDE 1 DROP: 5 SOLUTION/ DROPS OPHTHALMIC at 04:07

## 2024-07-22 NOTE — Clinical Note
"Tobias "Tom Capellan was seen and treated in our emergency department on 7/22/2024.  He may return to work on 07/30/2024.       If you have any questions or concerns, please don't hesitate to call.      HOMERO Jane RN    "

## 2024-07-22 NOTE — ED TRIAGE NOTES
"Patient states that he had a "popping' sensation in right eye this morning. Since that time, he has been rubbing his eye and reporting blurred vision. Seen at [facility] and sent to ED for evaluation. Presents awake, alert. Denies trauma. No distress.  "

## 2024-07-23 ENCOUNTER — TELEPHONE (OUTPATIENT)
Dept: OPHTHALMOLOGY | Facility: CLINIC | Age: 59
End: 2024-07-23
Payer: COMMERCIAL

## 2024-07-23 VITALS
DIASTOLIC BLOOD PRESSURE: 94 MMHG | RESPIRATION RATE: 18 BRPM | SYSTOLIC BLOOD PRESSURE: 187 MMHG | BODY MASS INDEX: 24.01 KG/M2 | HEART RATE: 63 BPM | WEIGHT: 187 LBS | TEMPERATURE: 98 F | OXYGEN SATURATION: 99 %

## 2024-07-23 PROCEDURE — 25000003 PHARM REV CODE 250: Performed by: EMERGENCY MEDICINE

## 2024-07-23 RX ORDER — LATANOPROST 50 UG/ML
1 SOLUTION/ DROPS OPHTHALMIC NIGHTLY
Qty: 2.5 ML | Refills: 0 | Status: SHIPPED | OUTPATIENT
Start: 2024-07-23

## 2024-07-23 RX ORDER — DORZOLAMIDE HYDROCHLORIDE AND TIMOLOL MALEATE 20; 5 MG/ML; MG/ML
1 SOLUTION/ DROPS OPHTHALMIC 2 TIMES DAILY
Qty: 10 ML | Refills: 0 | Status: SHIPPED | OUTPATIENT
Start: 2024-07-23

## 2024-07-23 RX ORDER — BRIMONIDINE TARTRATE 2 MG/ML
1 SOLUTION/ DROPS OPHTHALMIC 3 TIMES DAILY
Qty: 15 ML | Refills: 0 | Status: SHIPPED | OUTPATIENT
Start: 2024-07-23

## 2024-07-23 RX ORDER — DIPHENHYDRAMINE HCL 25 MG
1 CAPSULE ORAL EVERY 4 HOURS
Qty: 30 ML | Refills: 0 | Status: SHIPPED | OUTPATIENT
Start: 2024-07-23

## 2024-07-23 RX ORDER — PROPARACAINE HYDROCHLORIDE 5 MG/ML
1 SOLUTION/ DROPS OPHTHALMIC
Status: COMPLETED | OUTPATIENT
Start: 2024-07-23 | End: 2024-07-23

## 2024-07-23 RX ADMIN — PROPARACAINE HYDROCHLORIDE 1 DROP: 5 SOLUTION/ DROPS OPHTHALMIC at 12:07

## 2024-07-23 NOTE — TELEPHONE ENCOUNTER
----- Message from Anderson Anderson MD sent at 7/23/2024  2:29 AM CDT -----  Regarding: ED follow up  Contact: [phone]  Nancy,     This patient was seen in the Emergency Department for foreign body OD and glaucoma OU. Can they please get follow up in Glaucoma clinic in 2 weeks to establish care/new patient visit?    Best contact number - [phone]    Thank you!  Anderson Anderson MD MSc  [employer] Ophthalmology PGY2

## 2024-07-23 NOTE — ED PROVIDER NOTES
"Encounter Date: 7/22/2024       History     Chief Complaint   Patient presents with    Eye Injury     Pt sent for hyphema of R eye, hx of glaucoma - pt states he rubbed R eye this am and is now swollen, red, painful     Patient is a 59 year old male who presents for evaluation of right eye pain, irritation that began this morning. Patient has a hx of open angle glaucoma s/p trab and 2 AGVS in RE and s/p trab in left eye.     Patient says that this morning he felt a "popping sensation" in his right eye. Says that it is possible that he got dust in his eye this morning, but denies any other injury to the eye. Patient is prescribed cosopt, brimonidine, and latanoprost but has not taken his medication in many years. Was last seen by ophthalmology in 2022 at [facility].     Was seen at [facility] who recommended that patient come to the ER for further evaluation. Patient says that his eye has been continually tearing since this morning. No purulent drainage. Denies any significant vision changes.  Patient does not wear contacts.  No fever, chills, restricted eye movement, headache, blurred vision, double vision, periorbital warmth or redness, or any other complaints at this time.    The history is provided by the patient.     Review of patient's allergies indicates:  No Known Allergies  Past Medical History:   Diagnosis Date    Glaucoma      Past Surgical History:   Procedure Laterality Date    COLONOSCOPY N/A 10/17/2023    Procedure: COLONOSCOPY;  Surgeon: MARILUZ Saenz MD;  Location: [address]);  Service: Endoscopy;  Laterality: N/A;  8/8 ref. by Mando Rice MD, PEG, instr. to portal-st  10/10-lvm for precall-MS  10/16-precall complete-KPvt    EYE SURGERY      glaucoma     Family History   Problem Relation Name Age of Onset    Hypertension Mother      Hypertension Brother      Kidney disease Brother      Heart disease Brother      Cancer Brother          lung    Hypertension Brother       Social History "     Tobacco Use    Smoking status: Never    Smokeless tobacco: Never   Substance Use Topics    Alcohol use: Yes     Comment: limited    Drug use: No     Review of Systems   Constitutional:  Negative for chills and fever.   HENT:  Negative for sore throat.    Eyes:  Positive for pain and redness. Negative for photophobia and visual disturbance.   Respiratory:  Negative for shortness of breath.    Cardiovascular:  Negative for chest pain.   Gastrointestinal:  Negative for nausea.   Genitourinary:  Negative for dysuria.   Musculoskeletal:  Negative for back pain.   Skin:  Negative for rash.   Neurological:  Negative for weakness.   Hematological:  Does not bruise/bleed easily.       Physical Exam     Initial Vitals [07/22/24 1558]   BP Pulse Resp Temp SpO2   (!) 192/102 (!) 56 18 98.4 °F (36.9 °C) 98 %      MAP       --         Physical Exam    Constitutional: He appears well-developed and well-nourished.   HENT:   Head: Normocephalic and atraumatic.   Eyes:   PERRLA. No notable uptake on fluorescein exam.  No restricted eye movements.    IOP:  OD - 20, 22, 24  OS - 14, 16, 16      Neurological: He is alert.         ED Course   Procedures  Labs Reviewed - No data to display       Imaging Results    None          Medications   fluorescein ophthalmic strip 1 each (1 each Both Eyes Given 7/22/24 1643)   proparacaine 0.5 % ophthalmic solution 1 drop (1 drop Left Eye Given 7/22/24 1643)     Medical Decision Making  Patient is an afebrile 59 y.o. male with history of acute angle glaucoma presents for evaluation of right eye pain and irritation that began this morning.  Patient is noncompliant with his glaucoma medication. Patient's intra-ocular pressure as follows: OD - 20, 22, 24; OS - 14, 16, 16. No gross vision loss. Denies use of contact lenses. Denies known trauma or suspected microtrauma (dust, sand, etc).to the eye. denies foreign body sensation. There is not significant photophobia. There is not purulent drainage.  Denies chest pain, SOB, or any other complaints at this time. A&Ox4. The patient remained comfortable and stable during their visit in the ED. Details of ED course documented in ED workup.     Differential diagnosis includes, but is not limited to:  Acute angle glaucoma, open angle glaucoma, corneal abrasion, corneal ulcer, hyphema, iritis, CRAO, CRVO, conjunctivitis    After review of the patients physical exam, ED testing, and history/symptoms, the patient will be transferred to [facility] to be evaluated by Ophthalmology. Patient will be transferred ER to ER and be seen by Ophthalmology.  Discussed with Dr. Nguyen (Ophthalmology).  Transfer orders complete.  Admit orders will be completed. The diagnosis, treatment and plan for admission were discussed with the patient and understanding was verbalized. All questions or concerns have been addressed.     This case was discussed with and the patient has been  examined by Dr. Frederick who is in agreement with my assessment and plan.      Amount and/or Complexity of Data Reviewed  External Data Reviewed: notes.     Details: Reviewed notes from patient's ophthalmologist at [facility] and [facility]    Risk  Prescription drug management.               ED Course as of 07/22/24 2212 Mon Jul 22, 2024   1732 IOP:  OD - 20, 22, 24  OS - 14, 16, 16 [OB]   1743 Patient examined together with Dr. Frederick.  Agrees that patient needs to be transferred to a facility with ophthalmology for further evaluation.  ADT 22 placed. [OB]   1908 Call transfer center for update.  They are currently working on patient's case. [OB]   1929 Speaking with Dr. Nguyen (optho) at [facility], who will see patient. Patient to go ED to ED by POV. Report to ED: [phone].  [OB]   2012 Patient says that he has changed his mind and wants to go ambulance.  Calling transfer center [OB]      ED Course User Index  [OB] Kimberly Wheeler PA-C                           Clinical Impression:  Final  diagnoses:  [H40.10X0] Open-angle glaucoma, unspecified glaucoma stage, unspecified laterality, unspecified open-angle glaucoma type (Primary)          ED Disposition Condition    Transfer to Another Facility Kimberly Long PA-C  07/23/24 9789

## 2024-07-23 NOTE — ED PROVIDER NOTES
History:  Tobias Capellan is a 59 y.o. male who presents to the ED with Transfer (From [place] for Optho. Glaucoma )    Described as 59-year-old male transferred from [place] for of the morphologic evaluation of possible glaucoma with mildly elevated IOP on the right.  He does endorse increased eye irritation since this morning. Off eye drops for glaucoma x few months.    Review of Systems: All systems reviewed and are negative except as per history of present illness.    Medications:   Previous Medications    AZELASTINE (ASTELIN) 137 MCG (0.1 %) NASAL SPRAY    1 spray (137 mcg total) by Nasal route 2 (two) times daily.    BRIMONIDINE 0.2% (ALPHAGAN) 0.2 % DROP    INSTILL 1 DROP INTO EACH EYE THREE TIMES DAILY    BRIMONIDINE TARTRATE/TIMOLOL (COMBIGAN OPHT)    Apply to eye.    CETIRIZINE (ZYRTEC) 10 MG TABLET    Take 1 tablet (10 mg total) by mouth once daily. for 14 days    DORZOLAMIDE-TIMOLOL 2-0.5% (COSOPT) 22.3-6.8 MG/ML OPHTHALMIC SOLUTION    INSTILL 1 DROP INTO EACH EYE TWICE DAILY    DORZOLAMIDE-TIMOLOL 2-0.5% (COSOPT) 22.3-6.8 MG/ML OPHTHALMIC SOLUTION    Apply 1 drop to eye 2 (two) times daily.    ERYTHROMYCIN (ROMYCIN) OPHTHALMIC OINTMENT    Place a 1/2 inch ribbon of ointment into the lower eyelid three times a day for five days.    LATANOPROST 0.005 % OPHTHALMIC SOLUTION    Place 1 drop into both eyes nightly.    TIZANIDINE (ZANAFLEX) 4 MG TABLET    Take 1 tablet (4 mg total) by mouth every 6 (six) hours as needed.    UREA (CARMOL) 40 % CREA    Apply topically once daily.       PMH:   Past Medical History:   Diagnosis Date    Glaucoma      PSH:   Past Surgical History:   Procedure Laterality Date    COLONOSCOPY N/A 10/17/2023    Procedure: COLONOSCOPY;  Surgeon: MARILUZ Saenz MD;  Location: [facility] ([address] [place] [address]);  Service: Endoscopy;  Laterality: N/A;  8/8 ref. by Mando Rice MD, PEG, instr. to portal-st  10/10-lvm for precall-MS  10/16-precall complete-KPvt    EYE SURGERY      glaucoma     Allergies:  He has No Known Allergies.  Social History: Marital Status: single. He  reports that he has never smoked. He has never used smokeless tobacco.. He  reports current alcohol use..       Exam:  VITAL SIGNS:   Vitals:    07/22/24 2345   BP: (!) 178/96   BP Location: Right arm   Patient Position: Lying   Pulse: (!) 58   Resp: 16   Temp: 98.2 °F (36.8 °C)   TempSrc: Oral   SpO2: 99%   Weight: 84.8 kg (187 lb)     Const: Awake and alert, NAD   Head: Atraumatic  Eyes: EOMI, PERRL  ENT: Normal External Ears, Nose and Mouth.  Neck: Full range of motion. No meningismus.  Resp: Normal respiratory effort, No distress  Skin: No petechiae or rashes  Neur: Awake and alert  Psych: Normal Mood and Affect      Medical Decision Making:  Patient was seen and evaluated by Ophthalmology, suspect his acute problem is 2 to early viral conjunctivitis, recommend artificial tears and cool compresses.  He will be restarted on his open angle glaucoma medications.  He will follow up in ophthalmology clinic.  He is stable for discharge home with outpatient follow up.    Clinical Impression:  1. Glaucoma of both eyes, unspecified glaucoma type             Medication List        ASK your doctor about these medications      azelastine 137 mcg (0.1 %) nasal spray  Commonly known as: ASTELIN  1 spray (137 mcg total) by Nasal route 2 (two) times daily.     brimonidine 0.2% 0.2 % Drop  Commonly known as: ALPHAGAN     cetirizine 10 MG tablet  Commonly known as: ZYRTEC  Take 1 tablet (10 mg total) by mouth once daily. for 14 days     COMBIGAN OPHT     * dorzolamide-timolol 2-0.5% 22.3-6.8 mg/mL ophthalmic solution  Commonly known as: COSOPT     * dorzolamide-timolol 2-0.5% 22.3-6.8 mg/mL ophthalmic solution  Commonly known as: COSOPT     erythromycin ophthalmic ointment  Commonly known as: ROMYCIN  Place a 1/2 inch ribbon of ointment into the lower eyelid three times a day for five days.     latanoprost 0.005 % ophthalmic solution     tiZANidine 4 MG  tablet  Commonly known as: ZANAFLEX  Take 1 tablet (4 mg total) by mouth every 6 (six) hours as needed.     urea 40 % Crea  Commonly known as: CARMOL  Apply topically once daily.           * This list has 2 medication(s) that are the same as other medications prescribed for you. Read the directions carefully, and ask your doctor or other care provider to review them with you.                       Malathi Diaz MD  07/23/24 5578

## 2024-07-23 NOTE — CONSULTS
"Consultation Report  Ophthalmology Service    Date: 07/23/2024    Chief complaint/Reason for Consult: "glaucoma"     History of Present Illness: Tobias Capellan is a 59 y.o. male who presents with 1 day history of foreign body sensation and irritation to the right eye after feeling debris enter while at work. He washed his eye out several times but felt continued irritation. He noted relief with artificial tears and when proparacaine was instilled. He notes roughly 2 weeks of cough/cold symptoms and roughly 2 weeks ago his daughter having "pink eye" for which she was treated. He denies flashes/floaters/curtains/veils. He has significant history of glaucoma and reports he has not seen his specialist or used glaucoma drops in 1.5-2 years. He notes decline in his right eye vision with both central decrease and peripheral field loss.    POcularHx: POAG OU - S/P Ahmed Valve right eye x 2 (9-2002 & )   S/P Trab left eye (3-2000)   S/P Laser PI left eye   Does not use glasses or contacts.    Current eye gtts: none currently, previously prescribed - Brimonidine TID both eyes   Cosopt BID both eyes , Latanoprost QHS both eyes       PMHx:  has a past medical history of Glaucoma.     PSurgHx:  has a past surgical history that includes Eye surgery and Colonoscopy (N/A, 10/17/2023).     Home Medications:   Prior to Admission medications    Medication Sig Start Date End Date Taking? Authorizing Provider   azelastine (ASTELIN) 137 mcg (0.1 %) nasal spray 1 spray (137 mcg total) by Nasal route 2 (two) times daily. 12/11/23 12/10/24  Kimberly Wheeler PA-C   brimonidine 0.2% (ALPHAGAN) 0.2 % Drop INSTILL 1 DROP INTO EACH EYE THREE TIMES DAILY 5/11/20   Provider, Historical   BRIMONIDINE TARTRATE/TIMOLOL (COMBIGAN OPHT) Apply to eye.    Provider, Historical   cetirizine (ZYRTEC) 10 MG tablet Take 1 tablet (10 mg total) by mouth once daily. for 14 days 12/11/23 12/25/23  Kimberly Wheeler PA-C   dorzolamide-timolol 2-0.5% (COSOPT) " 22.3-6.8 mg/mL ophthalmic solution INSTILL 1 DROP INTO EACH EYE TWICE DAILY 6/8/20   Provider, Historical   dorzolamide-timolol 2-0.5% (COSOPT) 22.3-6.8 mg/mL ophthalmic solution Apply 1 drop to eye 2 (two) times daily. 6/15/21   Provider, Historical   erythromycin (ROMYCIN) ophthalmic ointment Place a 1/2 inch ribbon of ointment into the lower eyelid three times a day for five days.  Patient not taking: Reported on 10/31/2022 6/5/21   Ishmael Heck MD   latanoprost 0.005 % ophthalmic solution Place 1 drop into both eyes nightly. 6/15/21   Provider, Historical   tiZANidine (ZANAFLEX) 4 MG tablet Take 1 tablet (4 mg total) by mouth every 6 (six) hours as needed.  Patient not taking: Reported on 8/29/2023 6/26/23   Mando Rice MD   urea (CARMOL) 40 % Crea Apply topically once daily. 11/29/23   Ovidio Hunter, DPM        Medications this encounter:     Allergies: has No Known Allergies.     Social:  reports that he has never smoked. He has never used smokeless tobacco. He reports current alcohol use. He reports that he does not use drugs.     Family Hx: family history includes Cancer in his brother; Heart disease in his brother; Hypertension in his brother, brother, and mother; Kidney disease in his brother.     ROS: Negative x 10 except for complaints as described in HPI; negative for fever, chills, weight loss, nausea, vomiting, diarrhea, shortness of breath, nasal discharge, abdominal pain, dyspnea, difficulty moving arms and legs, confusion, dysuria, palpitations, or chest pain     Ocular examination/Dilated fundus examination:  Base Eye Exam       Visual Acuity (Snellen - Linear)         Right Left    Dist sc CF @ 6ft 20/25    Dist ph sc  20/20              Tonometry (Tonopen, 1:45 AM)         Right Left    Pressure 22 18              Pupils         Pupils Dark Light Shape React APD    Right PERRL 3 2 Round Brisk None    Left PERRL 3 2 Round Brisk None              Visual Fields (Counting fingers)   "       Right Left      Full    Restrictions Partial outer superior temporal, inferior temporal, superior nasal, inferior nasal deficiencies               Extraocular Movement         Right Left     Full, Ortho Full, Ortho              Neuro/Psych       Oriented x3: Yes              Dilation       Both eyes: 1% Mydriacyl, 2.5% Phenylephrine @ 2:08 AM                  Slit Lamp and Fundus Exam       External Exam         Right Left    External Normal Normal              Slit Lamp Exam         Right Left    Lids/Lashes Dermatochalasis - upper lid, Meibomian gland dysfunction, Ptosis Dermatochalasis - upper lid, Meibomian gland dysfunction, Ptosis    Conjunctiva/Sclera ST bleb over AGV. Well-formed SN bleb overhanging limbus, dense nasal melanosis. Larger bleb ST than SN. Racial melanosis, elevated diffuse superotemporal bleb White and quiet    Cornea Clear with, 2+ Punctate epithelial erosions Clear with, 1+ Punctate epithelial erosions, paracentral <1mm corneal scar at 7 o'clock    Anterior Chamber Deep and quiet, tube x2 ST, SN, no hyphema, no flare, no cell Deep and quiet, no hyphema, no flare, no cell    Iris Round reactive, ST atrophy Round and reactive,    Lens 2-3+ NSC 2-3+ NSC              Fundus Exam         Right Left    Disc sharp, pallor thin rim sharp, pink    C/D Ratio 0.9 0.6    Macula Normal, flat Normal, flat    Vessels Normal Normal    Periphery Normal, no h/t/d Normal, no h/t/d                    Assessment/Plan:   1. Foreign Body OD vs Early Viral Conjunctivitis OD  - patient notes irritation after feeling dust/debris fall into right eye today  - multiple eye washes with irritation persisting  - Sent by worker's comp  to "rule out hyphema", [facility] did not see evidence of hyphema  - On exam patient with significant PEEs OD > OS, no residual foreign body noted, no corneal abrasion, no AC rxn, no conjunctival injection, no hyphema  - Patient notes daughter recently (~2 weeks ago) had "pink eye" " and used drops  - no evidence of injection, papillary reaction, mucoid discharge, membrane    Recommendations  - Start Artificial tears 4-6x/day Both Eyes  - Cool compresses for eye irritation if persistent  - Encouraged good hand hygiene/washing clothes/towels frequently, no shared use     2. Primary Open Angle Glaucoma severe OD > OS  - Patient with severe glaucoma, s/p multiple surgeries OD  - VA significantly decreased from prior OD (20/80 in 9/2022)  - Patient lost to follow up, not used drops in 1.5-2 years  - Previously on:   Brimonidine TID both eyes   Cosopt BID both eyes   Latanoprost QHS both eyes   - IOP 22/18 today, near previously documented pressures, likely above goal  - will schedule follow up in glaucoma clinic in 2 weeks to re establish care    Recommendations  - Restart Brimonidine TID both eyes   - Restart Cosopt BID both eyes   - Restart Latanoprost QHS both eyes     Patient seen with Dr. Tavarez. Ophthalmology will sign off. If there are further questions, please page the on call ophthalmology resident.    Anderson Anderson MD  PGY2, Ophthalmology Resident  07/23/2024  2:09 AM

## 2024-07-23 NOTE — DISCHARGE INSTRUCTIONS
- Start Artificial tears 4-6x/day Both Eyes  - Cool compresses for eye irritation if persistent  - Encouraged good hand hygiene/washing clothes/towels frequently, no shared use   -  Restart Brimonidine TID both eyes   - Restart Cosopt BID both eyes   - Restart Latanoprost QHS both eyes

## 2024-09-22 NOTE — PROGRESS NOTES
MEDICAL HISTORY  Hypertension  Glaucoma  Lumbar spondylosis with lumbar radiculopathy  Osteoarthritis hip     Social history  Tobacco use none  Alcohol use limited  Works as       Family history   Mother, hypertension, glaucoma,  Father, , did not know it was father   Five brothers for  history of hypertension diabetes lung cancer heart disease kidney disease, disease   One sister        Reported colonoscopy , polyps were resected        59-year-old male   Presents for an annual routine visit   The only ongoing problem is persistent lumbar pain particularly in the left side upper buttocks but could extend to the right.  Presently no radicular symptoms extending down the leg.  At 1 point he was at [employer] back program but he thought that has made it worse.  Last year had an MRI that has showed the advanced degenerative changes L4-5 with a degree of spinal stenosis and neuroforaminal stenosis    Also in the past he was on medication to control blood pressure.  There was prescribed on a few occasions but he never maintain being on medication for no particular reason    Review of symptoms   No chest pain palpitations.  No difficulty breathing no abdominal pain.  Regular bowel function.  No difficulty urinating but there could be has not see where initiated in his stream  No heartburn indigestion headaches      Examination   Examination   Weight 186   BMI 23.89   Pulse 76   Blood pressure 162/80   HEENT exam, no abnormal findings   Neck, no thyromegaly no masses   Chest, clear breath sounds   Heart, regular rate rhythm no murmurs or gallops   Abdominal exam, soft nontender no hepatosplenomegaly abdominal masses bruits  Pulses, 2+ carotid pulses no bruits 2+ dorsalis pedal pulses   Extremities no edema   Lymph gland no palpable adenopathy  Digital rectal exam stool is brown prostate is mildly moderately enlarged smooth    Impression   General exam  Hypertension   Lumbar  spondylosis  BPH  Prostate cancer screening    Plan   Routine labs, urinalysis   Disposition file in regard to management of blood pressure.  In possibly use of the anti-inflammatory medicine  Referral to spine clinic

## 2024-09-23 ENCOUNTER — LAB VISIT (OUTPATIENT)
Dept: LAB | Facility: HOSPITAL | Age: 59
End: 2024-09-23
Attending: INTERNAL MEDICINE
Payer: COMMERCIAL

## 2024-09-23 ENCOUNTER — OFFICE VISIT (OUTPATIENT)
Dept: INTERNAL MEDICINE | Facility: CLINIC | Age: 59
End: 2024-09-23
Payer: COMMERCIAL

## 2024-09-23 VITALS
HEART RATE: 75 BPM | OXYGEN SATURATION: 98 % | HEIGHT: 74 IN | SYSTOLIC BLOOD PRESSURE: 172 MMHG | DIASTOLIC BLOOD PRESSURE: 90 MMHG | BODY MASS INDEX: 23.88 KG/M2 | WEIGHT: 186.06 LBS

## 2024-09-23 DIAGNOSIS — N40.0 BENIGN PROSTATIC HYPERPLASIA WITHOUT LOWER URINARY TRACT SYMPTOMS: ICD-10-CM

## 2024-09-23 DIAGNOSIS — Z00.00 ANNUAL PHYSICAL EXAM: Primary | ICD-10-CM

## 2024-09-23 DIAGNOSIS — I10 ESSENTIAL HYPERTENSION: ICD-10-CM

## 2024-09-23 DIAGNOSIS — Z12.5 PROSTATE CANCER SCREENING: ICD-10-CM

## 2024-09-23 DIAGNOSIS — Z00.00 ANNUAL PHYSICAL EXAM: ICD-10-CM

## 2024-09-23 DIAGNOSIS — M47.816 LUMBAR SPONDYLOSIS: ICD-10-CM

## 2024-09-23 LAB
ALBUMIN SERPL BCP-MCNC: 3.8 G/DL (ref 3.5–5.2)
ALP SERPL-CCNC: 73 U/L (ref 55–135)
ALT SERPL W/O P-5'-P-CCNC: 16 U/L (ref 10–44)
ANION GAP SERPL CALC-SCNC: 8 MMOL/L (ref 8–16)
AST SERPL-CCNC: 20 U/L (ref 10–40)
BASOPHILS # BLD AUTO: 0.01 K/UL (ref 0–0.2)
BASOPHILS NFR BLD: 0.3 % (ref 0–1.9)
BILIRUB SERPL-MCNC: 0.4 MG/DL (ref 0.1–1)
BUN SERPL-MCNC: 20 MG/DL (ref 6–20)
CALCIUM SERPL-MCNC: 8.7 MG/DL (ref 8.7–10.5)
CHLORIDE SERPL-SCNC: 111 MMOL/L (ref 95–110)
CHOLEST SERPL-MCNC: 162 MG/DL (ref 120–199)
CHOLEST/HDLC SERPL: 2.8 {RATIO} (ref 2–5)
CO2 SERPL-SCNC: 22 MMOL/L (ref 23–29)
COMPLEXED PSA SERPL-MCNC: 2 NG/ML (ref 0–4)
CREAT SERPL-MCNC: 1.2 MG/DL (ref 0.5–1.4)
DIFFERENTIAL METHOD BLD: ABNORMAL
EOSINOPHIL # BLD AUTO: 0.2 K/UL (ref 0–0.5)
EOSINOPHIL NFR BLD: 4.6 % (ref 0–8)
ERYTHROCYTE [DISTWIDTH] IN BLOOD BY AUTOMATED COUNT: 12.5 % (ref 11.5–14.5)
EST. GFR  (NO RACE VARIABLE): >60 ML/MIN/1.73 M^2
GLUCOSE SERPL-MCNC: 114 MG/DL (ref 70–110)
HCT VFR BLD AUTO: 37.5 % (ref 40–54)
HDLC SERPL-MCNC: 58 MG/DL (ref 40–75)
HDLC SERPL: 35.8 % (ref 20–50)
HGB BLD-MCNC: 11.9 G/DL (ref 14–18)
IMM GRANULOCYTES # BLD AUTO: 0 K/UL (ref 0–0.04)
IMM GRANULOCYTES NFR BLD AUTO: 0 % (ref 0–0.5)
LDLC SERPL CALC-MCNC: 91.2 MG/DL (ref 63–159)
LYMPHOCYTES # BLD AUTO: 1.4 K/UL (ref 1–4.8)
LYMPHOCYTES NFR BLD: 39 % (ref 18–48)
MCH RBC QN AUTO: 28.8 PG (ref 27–31)
MCHC RBC AUTO-ENTMCNC: 31.7 G/DL (ref 32–36)
MCV RBC AUTO: 91 FL (ref 82–98)
MONOCYTES # BLD AUTO: 0.4 K/UL (ref 0.3–1)
MONOCYTES NFR BLD: 10.6 % (ref 4–15)
NEUTROPHILS # BLD AUTO: 1.6 K/UL (ref 1.8–7.7)
NEUTROPHILS NFR BLD: 45.5 % (ref 38–73)
NONHDLC SERPL-MCNC: 104 MG/DL
NRBC BLD-RTO: 0 /100 WBC
PLATELET # BLD AUTO: 213 K/UL (ref 150–450)
PMV BLD AUTO: 10.6 FL (ref 9.2–12.9)
POTASSIUM SERPL-SCNC: 4.1 MMOL/L (ref 3.5–5.1)
PROT SERPL-MCNC: 6.7 G/DL (ref 6–8.4)
RBC # BLD AUTO: 4.13 M/UL (ref 4.6–6.2)
SODIUM SERPL-SCNC: 141 MMOL/L (ref 136–145)
TRIGL SERPL-MCNC: 64 MG/DL (ref 30–150)
TSH SERPL DL<=0.005 MIU/L-ACNC: 0.7 UIU/ML (ref 0.4–4)
WBC # BLD AUTO: 3.49 K/UL (ref 3.9–12.7)

## 2024-09-23 PROCEDURE — 80061 LIPID PANEL: CPT | Performed by: INTERNAL MEDICINE

## 2024-09-23 PROCEDURE — 3080F DIAST BP >= 90 MM HG: CPT | Mod: CPTII,S$GLB,, | Performed by: INTERNAL MEDICINE

## 2024-09-23 PROCEDURE — 80053 COMPREHEN METABOLIC PANEL: CPT | Performed by: INTERNAL MEDICINE

## 2024-09-23 PROCEDURE — 99999 PR PBB SHADOW E&M-EST. PATIENT-LVL IV: CPT | Mod: PBBFAC,,, | Performed by: INTERNAL MEDICINE

## 2024-09-23 PROCEDURE — 99396 PREV VISIT EST AGE 40-64: CPT | Mod: S$GLB,,, | Performed by: INTERNAL MEDICINE

## 2024-09-23 PROCEDURE — 84443 ASSAY THYROID STIM HORMONE: CPT | Performed by: INTERNAL MEDICINE

## 2024-09-23 PROCEDURE — 84153 ASSAY OF PSA TOTAL: CPT | Performed by: INTERNAL MEDICINE

## 2024-09-23 PROCEDURE — 3008F BODY MASS INDEX DOCD: CPT | Mod: CPTII,S$GLB,, | Performed by: INTERNAL MEDICINE

## 2024-09-23 PROCEDURE — 3077F SYST BP >= 140 MM HG: CPT | Mod: CPTII,S$GLB,, | Performed by: INTERNAL MEDICINE

## 2024-09-23 PROCEDURE — 85025 COMPLETE CBC W/AUTO DIFF WBC: CPT | Performed by: INTERNAL MEDICINE

## 2024-09-23 PROCEDURE — 1159F MED LIST DOCD IN RCRD: CPT | Mod: CPTII,S$GLB,, | Performed by: INTERNAL MEDICINE

## 2024-09-23 PROCEDURE — 36415 COLL VENOUS BLD VENIPUNCTURE: CPT | Performed by: INTERNAL MEDICINE

## 2024-09-24 ENCOUNTER — PATIENT MESSAGE (OUTPATIENT)
Dept: INTERNAL MEDICINE | Facility: CLINIC | Age: 59
End: 2024-09-24
Payer: COMMERCIAL

## 2024-09-24 DIAGNOSIS — R73.9 HYPERGLYCEMIA: Primary | ICD-10-CM

## 2024-09-24 DIAGNOSIS — D64.9 ANEMIA, UNSPECIFIED TYPE: ICD-10-CM

## 2024-09-24 DIAGNOSIS — Z13.88 SCREENING FOR HEAVY METAL POISONING: ICD-10-CM

## 2024-09-30 ENCOUNTER — LAB VISIT (OUTPATIENT)
Dept: LAB | Facility: HOSPITAL | Age: 59
End: 2024-09-30
Attending: INTERNAL MEDICINE
Payer: COMMERCIAL

## 2024-09-30 DIAGNOSIS — D64.9 ANEMIA, UNSPECIFIED TYPE: ICD-10-CM

## 2024-09-30 DIAGNOSIS — Z13.88 SCREENING FOR HEAVY METAL POISONING: ICD-10-CM

## 2024-09-30 DIAGNOSIS — R73.9 HYPERGLYCEMIA: ICD-10-CM

## 2024-09-30 LAB
FERRITIN SERPL-MCNC: 119 NG/ML (ref 20–300)
IRON SERPL-MCNC: 63 UG/DL (ref 45–160)
SATURATED IRON: 18 % (ref 20–50)
TOTAL IRON BINDING CAPACITY: 355 UG/DL (ref 250–450)
TRANSFERRIN SERPL-MCNC: 240 MG/DL (ref 200–375)

## 2024-09-30 PROCEDURE — 82728 ASSAY OF FERRITIN: CPT | Performed by: INTERNAL MEDICINE

## 2024-09-30 PROCEDURE — 82746 ASSAY OF FOLIC ACID SERUM: CPT | Performed by: INTERNAL MEDICINE

## 2024-09-30 PROCEDURE — 83036 HEMOGLOBIN GLYCOSYLATED A1C: CPT | Performed by: INTERNAL MEDICINE

## 2024-09-30 PROCEDURE — 85045 AUTOMATED RETICULOCYTE COUNT: CPT | Performed by: INTERNAL MEDICINE

## 2024-09-30 PROCEDURE — 83540 ASSAY OF IRON: CPT | Performed by: INTERNAL MEDICINE

## 2024-09-30 PROCEDURE — 82607 VITAMIN B-12: CPT | Performed by: INTERNAL MEDICINE

## 2024-09-30 PROCEDURE — 85025 COMPLETE CBC W/AUTO DIFF WBC: CPT | Performed by: INTERNAL MEDICINE

## 2024-09-30 PROCEDURE — 82175 ASSAY OF ARSENIC: CPT | Performed by: INTERNAL MEDICINE

## 2024-09-30 PROCEDURE — 36415 COLL VENOUS BLD VENIPUNCTURE: CPT | Performed by: INTERNAL MEDICINE

## 2024-09-30 PROCEDURE — 83825 ASSAY OF MERCURY: CPT | Performed by: INTERNAL MEDICINE

## 2024-09-30 PROCEDURE — 82300 ASSAY OF CADMIUM: CPT | Performed by: INTERNAL MEDICINE

## 2024-10-01 LAB
BASOPHILS # BLD AUTO: 0.01 K/UL (ref 0–0.2)
BASOPHILS NFR BLD: 0.3 % (ref 0–1.9)
DIFFERENTIAL METHOD BLD: ABNORMAL
EOSINOPHIL # BLD AUTO: 0.2 K/UL (ref 0–0.5)
EOSINOPHIL NFR BLD: 4.8 % (ref 0–8)
ERYTHROCYTE [DISTWIDTH] IN BLOOD BY AUTOMATED COUNT: 12.5 % (ref 11.5–14.5)
ESTIMATED AVG GLUCOSE: 103 MG/DL (ref 68–131)
FOLATE SERPL-MCNC: 5.5 NG/ML (ref 4–24)
HBA1C MFR BLD: 5.2 % (ref 4–5.6)
HCT VFR BLD AUTO: 39.4 % (ref 40–54)
HGB BLD-MCNC: 12.5 G/DL (ref 14–18)
IMM GRANULOCYTES # BLD AUTO: 0.01 K/UL (ref 0–0.04)
IMM GRANULOCYTES NFR BLD AUTO: 0.3 % (ref 0–0.5)
LYMPHOCYTES # BLD AUTO: 1.1 K/UL (ref 1–4.8)
LYMPHOCYTES NFR BLD: 31.2 % (ref 18–48)
MCH RBC QN AUTO: 29.3 PG (ref 27–31)
MCHC RBC AUTO-ENTMCNC: 31.7 G/DL (ref 32–36)
MCV RBC AUTO: 93 FL (ref 82–98)
MONOCYTES # BLD AUTO: 0.3 K/UL (ref 0.3–1)
MONOCYTES NFR BLD: 8.1 % (ref 4–15)
NEUTROPHILS # BLD AUTO: 2 K/UL (ref 1.8–7.7)
NEUTROPHILS NFR BLD: 55.3 % (ref 38–73)
NRBC BLD-RTO: 0 /100 WBC
PLATELET # BLD AUTO: 209 K/UL (ref 150–450)
PMV BLD AUTO: 10.9 FL (ref 9.2–12.9)
RBC # BLD AUTO: 4.26 M/UL (ref 4.6–6.2)
RETICS/RBC NFR AUTO: 1.3 % (ref 0.4–2)
VIT B12 SERPL-MCNC: 527 PG/ML (ref 210–950)
WBC # BLD AUTO: 3.56 K/UL (ref 3.9–12.7)

## 2024-10-02 LAB
ARSENIC BLD-MCNC: <1 NG/ML
CADMIUM BLD-MCNC: <0.2 NG/ML
CITY: NORMAL
COUNTY: NORMAL
GUARDIAN FIRST NAME: NORMAL
GUARDIAN LAST NAME: NORMAL
HOME PHONE: NORMAL
LEAD BLD-MCNC: <1 MCG/DL
MERCURY BLD-MCNC: <1 NG/ML
RACE: NORMAL
STATE: NORMAL
STREET ADDRESS: NORMAL
VENOUS/CAPILLARY: NORMAL
ZIP: NORMAL

## 2024-10-14 ENCOUNTER — TELEPHONE (OUTPATIENT)
Dept: INTERNAL MEDICINE | Facility: CLINIC | Age: 59
End: 2024-10-14
Payer: COMMERCIAL

## 2024-10-14 DIAGNOSIS — D64.9 ANEMIA, UNSPECIFIED TYPE: ICD-10-CM

## 2024-10-14 DIAGNOSIS — M54.50 LUMBAR PAIN: ICD-10-CM

## 2024-10-14 DIAGNOSIS — I10 ESSENTIAL HYPERTENSION: Primary | ICD-10-CM

## 2024-10-14 NOTE — TELEPHONE ENCOUNTER
----- Message from Ammy sent at 10/14/2024  1:13 PM CDT -----  Contact: [phone]  [email or web address]     Patient is calling for Medical Advice regarding: talk with  About lab results from 9/23/24 & 9/30/24    Patient wants a call back or thru [facility]: call    Comments:    Please advise patient replies from provider may take up to 48 hours.

## 2024-10-16 ENCOUNTER — OFFICE VISIT (OUTPATIENT)
Dept: PAIN MEDICINE | Facility: CLINIC | Age: 59
End: 2024-10-16
Payer: COMMERCIAL

## 2024-10-16 ENCOUNTER — HOSPITAL ENCOUNTER (OUTPATIENT)
Dept: RADIOLOGY | Facility: OTHER | Age: 59
Discharge: HOME | End: 2024-10-16
Attending: STUDENT IN AN ORGANIZED HEALTH CARE EDUCATION/TRAINING PROGRAM
Payer: COMMERCIAL

## 2024-10-16 ENCOUNTER — PATIENT MESSAGE (OUTPATIENT)
Dept: ADMINISTRATIVE | Facility: HOSPITAL | Age: 59
End: 2024-10-16
Payer: COMMERCIAL

## 2024-10-16 VITALS
BODY MASS INDEX: 24.37 KG/M2 | HEART RATE: 66 BPM | TEMPERATURE: 98 F | WEIGHT: 189.81 LBS | SYSTOLIC BLOOD PRESSURE: 176 MMHG | OXYGEN SATURATION: 99 % | DIASTOLIC BLOOD PRESSURE: 97 MMHG

## 2024-10-16 DIAGNOSIS — M54.9 DORSALGIA, UNSPECIFIED: Primary | ICD-10-CM

## 2024-10-16 DIAGNOSIS — M47.816 LUMBAR SPONDYLOSIS: ICD-10-CM

## 2024-10-16 DIAGNOSIS — M54.50 LUMBAR PAIN: ICD-10-CM

## 2024-10-16 PROCEDURE — 72114 X-RAY EXAM L-S SPINE BENDING: CPT | Mod: TC,FY

## 2024-10-16 PROCEDURE — 1159F MED LIST DOCD IN RCRD: CPT | Mod: CPTII,S$GLB,, | Performed by: STUDENT IN AN ORGANIZED HEALTH CARE EDUCATION/TRAINING PROGRAM

## 2024-10-16 PROCEDURE — 3044F HG A1C LEVEL LT 7.0%: CPT | Mod: CPTII,S$GLB,, | Performed by: STUDENT IN AN ORGANIZED HEALTH CARE EDUCATION/TRAINING PROGRAM

## 2024-10-16 PROCEDURE — 99204 OFFICE O/P NEW MOD 45 MIN: CPT | Mod: S$GLB,,, | Performed by: STUDENT IN AN ORGANIZED HEALTH CARE EDUCATION/TRAINING PROGRAM

## 2024-10-16 PROCEDURE — 72114 X-RAY EXAM L-S SPINE BENDING: CPT | Mod: 26,,, | Performed by: RADIOLOGY

## 2024-10-16 PROCEDURE — 1160F RVW MEDS BY RX/DR IN RCRD: CPT | Mod: CPTII,S$GLB,, | Performed by: STUDENT IN AN ORGANIZED HEALTH CARE EDUCATION/TRAINING PROGRAM

## 2024-10-16 PROCEDURE — 99999 PR PBB SHADOW E&M-EST. PATIENT-LVL V: CPT | Mod: PBBFAC,,, | Performed by: STUDENT IN AN ORGANIZED HEALTH CARE EDUCATION/TRAINING PROGRAM

## 2024-10-16 PROCEDURE — 3077F SYST BP >= 140 MM HG: CPT | Mod: CPTII,S$GLB,, | Performed by: STUDENT IN AN ORGANIZED HEALTH CARE EDUCATION/TRAINING PROGRAM

## 2024-10-16 PROCEDURE — 3080F DIAST BP >= 90 MM HG: CPT | Mod: CPTII,S$GLB,, | Performed by: STUDENT IN AN ORGANIZED HEALTH CARE EDUCATION/TRAINING PROGRAM

## 2024-10-16 PROCEDURE — 3008F BODY MASS INDEX DOCD: CPT | Mod: CPTII,S$GLB,, | Performed by: STUDENT IN AN ORGANIZED HEALTH CARE EDUCATION/TRAINING PROGRAM

## 2024-10-16 NOTE — PROGRESS NOTES
"Chronic Pain - New Consult    Referring Physician: Mando Rice MD    Chief Complaint:   Chief Complaint   Patient presents with    Low-back Pain          SUBJECTIVE:    Tobias Capellan presents to the clinic for the evaluation of BL gluteal pain. The pain started 3 years ago following a fall and symptoms have been worsening particularly in the last year.The pain is located in a band like distribution "right on top the buttocks".  The pain is described as tight band or "sore muscle like" and is rated as 4/10. The pain is rated with a score of  2/10 on the BEST day and a score of 8/10 on the WORST day.  The pain is exacerbated by Extension > flexion, Getting out of bed/chair, and prolonged standing or prolonged sitting, coughing and sneezing.  The pain is mitigated by getting in the fetal position. Symptoms interfere with daily activity and sleeping. The patient reports 3-4 hours of uninterrupted sleep per night.    Patient denies urinary incontinence, bowel incontinence, significant weight loss, significant motor weakness, and loss of sensations.     Physical Therapy/Home Exercise: yes, completed PT for LBP in 2018. He cannot participate in HEP due to pain.     Pain Disability Index Review:      10/16/2024     3:45 PM 8/29/2023     3:10 PM 8/29/2023     3:09 PM   Last 3 PDI Scores   Pain Disability Index (PDI) 35 52 48       Pain Medications:  none     report:  Not applicable    Pain Procedures: none    Imaging:     MRI LUMBAR SPINE WITHOUT CONTRAST 6/26/23  CLINICAL HISTORY:  Low back pain, symptoms persist with > 6wks conservative treatment; Spondylosis without myelopathy or radiculopathy, lumbar region  TECHNIQUE:  Multiplanar, multisequence MR images were acquired from the thoracolumbar junction to the sacrum without the administration of contrast.  COMPARISON:  09/30/2020     FINDINGS:  The vertebral bodies demonstrate no evidence of fracture, osseous destructive process or aggressive bone marrow " replacement process.     Grade 1 anterolisthesis L4-5.  Otherwise normal sagittal alignment is preserved.     Degenerative changes individual disc levels further discussed below:     L1-2, L2-3: No significant degenerative disc disease, spinal canal stenosis or neural foraminal narrowing.     L3-4: Mild facet arthropathy.  No significant degenerative disc disease, spinal canal stenosis or neural foraminal narrowing.     L4-5: Advanced facet hypertrophy with associated anterolisthesis.  There is unroofing of the disc, mild disc bulging, mild loss of disc height, and circumferential endplate marginal osteophyte formation.  Prior left-sided ascending disc extrusion is notably improved.  Resultant modest narrowing of the overall spinal canal diameter with left greater than right lateral recess stenosis and mild left-sided neural foraminal narrowing.     L5-S1: Moderate degenerative disc disease which is progressed since the prior.  This includes loss of disc height with vacuum disc phenomenon, circumferential disc bulging, Modic type degenerative endplate marrow changes and circumferential endplate marginal osteophyte formation.  Right greater than left facet arthropathy.  No significant spinal stenosis but there is mild right lateral recess and neural foraminal encroachment.     Impression:  Notable progression of degenerative disc disease at L5-S1 since the prior exam of 09/30/2020.  Additional moderate degenerative change at L4-5.    XR LUMBAR SPINE AP AND LAT WITH FLEX/EXT 6/1/22  CLINICAL HISTORY:  Lower lumbar/sacroiliac pain, worse with bending over;  Low back pain, unspecified  TECHNIQUE:  AP and lateral views as well as lateral flexion and extension images are performed through the lumbar spine.     COMPARISON:  07/01/2020     FINDINGS:  The lumbar vertebra are intact.  No compression fracture is seen.  AP view shows slight curvature convex to right that may be positional.  L4-5 again shows grade 1  anterolisthesis, stable between flexion and extension.  Chronic degenerative changes are again noted.  Fourth and 5th lumbar disc spaces are narrowed.  Hypertrophic degenerative changes are seen at lower facet joints, explaining the subluxation.  Upper lumbar disc spaces are better preserved.  No obvious paraspinal lesion is detected.  Impression:  No acute abnormality.  Chronic degenerative changes in lower lumbar spine    Past Medical History:   Diagnosis Date    Glaucoma      Past Surgical History:   Procedure Laterality Date    COLONOSCOPY N/A 10/17/2023    Procedure: COLONOSCOPY;  Surgeon: MARILUZ Saenz MD;  [facility]  Service: Endoscopy;  Laterality: N/A;  8/8 ref. by Mando Rice MD, PEG, instr. to portal-st  10/10-lvm for precall-MS  10/16-precall complete-KPvt    EYE SURGERY      glaucoma     Social History     Socioeconomic History    Marital status: Single   Tobacco Use    Smoking status: Never    Smokeless tobacco: Never   Substance and Sexual Activity    Alcohol use: Yes     Comment: limited    Drug use: No     Social Drivers of Health     Financial Resource Strain: Low Risk  (9/20/2024)    Overall Financial Resource Strain (CARDIA)     Difficulty of Paying Living Expenses: Not hard at all   Food Insecurity: No Food Insecurity (9/20/2024)    Hunger Vital Sign     Worried About Running Out of Food in the Last Year: Never true     Ran Out of Food in the Last Year: Never true   Physical Activity: Unknown (9/20/2024)    Exercise Vital Sign     Minutes of Exercise per Session: 30 min   Stress: Stress Concern Present (9/20/2024)    [employer] - Occupational Stress Questionnaire     Feeling of Stress : To some extent   Housing Stability: High Risk (9/20/2024)    Housing Stability Vital Sign     Unable to Pay for Housing in the Last Year: Yes     Family History   Problem Relation Name Age of Onset    Hypertension Mother      Ulcers Brother      Cancer  Brother          lung    Kidney disease Brother      Heart disease Brother      Hypertension Brother         Review of patient's allergies indicates:  No Known Allergies    Current Outpatient Medications   Medication Sig    brimonidine 0.2% (ALPHAGAN) 0.2 % Drop Place 1 drop into both eyes 3 (three) times daily.    BRIMONIDINE TARTRATE/TIMOLOL (COMBIGAN OPHT) Apply to eye.    dextran 70-hypromellose (ARTIFICIAL TEARS,[id number],) ophthalmic solution Place 1 drop into both eyes every 4 (four) hours.    dorzolamide-timolol 2-0.5% (COSOPT) 22.3-6.8 mg/mL ophthalmic solution Apply 1 drop to eye 2 (two) times daily.    dorzolamide-timolol 2-0.5% (COSOPT) 22.3-6.8 mg/mL ophthalmic solution Place 1 drop into both eyes 2 (two) times daily.    latanoprost 0.005 % ophthalmic solution Place 1 drop into both eyes nightly.    azelastine (ASTELIN) 137 mcg (0.1 %) nasal spray 1 spray (137 mcg total) by Nasal route 2 (two) times daily. (Patient not taking: Reported on 10/16/2024)     No current facility-administered medications for this visit.       REVIEW OF SYSTEMS:    GENERAL:  current fevers or chills, recent use of antibiotics denies .  HEENT:  History of migraines/headaches: denies , History of major throat surgery: denies   RESPIRATORY:  History of home oxygen or pulmonary hypertension/severe breathing dysfunction: denies   CARDIOVASCULAR:  Hx of palpitations/rhythm problems: denies  Hx of Heart Attacks/Surgery: denies   GI:  Recent abdominal discomfort or recent change in bowel habits denies   MUSCULOSKELETAL:  See HPI.  SKIN:  unhealed wounds or rashes: denies   PSYCH: major psychiatric history or recent psychosocial stressors    HEMATOLOGY/LYMPHOLOGY:  Hx of prolonged bleeding, Hx of Blood thinner usage: denies   NEURO:   history of seizures, strokes, chronic/old weakness (such as paralysis or paresis of any body part): denies   All other reviewed and negative other than HPI.    OBJECTIVE:    BP (!) 176/97 (Patient  Position: Sitting)   Pulse 66   Temp 97.9 °F (36.6 °C)   Wt 86.1 kg (189 lb 13.1 oz)   SpO2 99%   BMI 24.37 kg/m²     PHYSICAL EXAMINATION:  General appearance: Well appearing, in no acute distress, alert and appropriately communicative.  Psych:  Mood and affect appropriate.  Skin: Skin color, texture, turgor normal, no rashes or lesions, in both upper and lower body for exposed skin.  Head/face:  Atraumatic, normocephalic.  Pulm: non-labored breathing  GI: Abdomen non-distended and non-tender.  Back: Straight leg raising in the sitting and supine positions is negative to radicular pain. No pain to palpation over the spine or paraspinal muscles. Normal range of motion in lumbar flexion without pain reproduction. Limited extension ROM 2/2 pain. +slump test, +milgrams, +facet loading (worst)  Extremities: No deformities, significant lymphedema, or skin discoloration. No significant open wounds. No major amputations.   Musculoskeletal:  hip and sacroiliac provocative maneuvers are negative. Bilateral lower extremity strength is normal and symmetric.  No atrophy or tone abnormalities are noted.  Neuro: Bilateral upper and lower extremity coordination and muscle stretch reflexes abnormalities noted: Left patella reflex 1+, right patella reflex 2+ (in the setting of left knee surgery).  Manning and/or Clonus: none; loss of sensation to light touch: none  Gait: WNL    CMP  Sodium   Date Value Ref Range Status   09/23/2024 141 136 - 145 mmol/L Final     Potassium   Date Value Ref Range Status   09/23/2024 4.1 3.5 - 5.1 mmol/L Final     Chloride   Date Value Ref Range Status   09/23/2024 111 (H) 95 - 110 mmol/L Final     CO2   Date Value Ref Range Status   09/23/2024 22 (L) 23 - 29 mmol/L Final     Glucose   Date Value Ref Range Status   09/23/2024 114 (H) 70 - 110 mg/dL Final     BUN   Date Value Ref Range Status   09/23/2024 20 6 - 20 mg/dL Final     Creatinine   Date Value Ref Range Status   09/23/2024 1.2 0.5 - 1.4  mg/dL Final     Calcium   Date Value Ref Range Status   09/23/2024 8.7 8.7 - 10.5 mg/dL Final     Total Protein   Date Value Ref Range Status   09/23/2024 6.7 6.0 - 8.4 g/dL Final     Albumin   Date Value Ref Range Status   09/23/2024 3.8 3.5 - 5.2 g/dL Final     Total Bilirubin   Date Value Ref Range Status   09/23/2024 0.4 0.1 - 1.0 mg/dL Final     Comment:     For infants and newborns, interpretation of results should be based  on gestational age, weight and in agreement with clinical  observations.    Premature Infant recommended reference ranges:  Up to 24 hours.............<8.0 mg/dL  Up to 48 hours............<12.0 mg/dL  3-5 days..................<15.0 mg/dL  6-29 days.................<15.0 mg/dL       Alkaline Phosphatase   Date Value Ref Range Status   09/23/2024 73 55 - 135 U/L Final     AST   Date Value Ref Range Status   09/23/2024 20 10 - 40 U/L Final     ALT   Date Value Ref Range Status   09/23/2024 16 10 - 44 U/L Final     Anion Gap   Date Value Ref Range Status   09/23/2024 8 8 - 16 mmol/L Final     eGFR   Date Value Ref Range Status   09/23/2024 >60.0 >60 mL/min/1.73 m^2 Final         ASSESSMENT: 59 y.o. year old male with low back pain, consistent with:    1. Dorsalgia, unspecified  [facility]    Ambulatory referral/consult to [facility]      2. Lumbar spondylosis  Ambulatory referral[facility]    Ambulatory referral/consult to [facility]    X-Ray Lumbar Complete Including Flex And Ext    MRI Lumbar Spine Without Contrast      3. Lumbar pain  Ambulatory referral/consult to [facility]    X-Ray Lumbar Complete Including Flex And Ext        IMPRESSION: Tboias Capellan presents today for low back pain. History and physical exam are consistent with axial / facetogenic low back pain vs discogenic pain.  My independent interpretation of the imaging is consistent with facet arthropathy and degenerative disc disease.   We will continue with conservative management (physical therapy and non-narcotic medications). If their pain persists despite conservative measures, we will consider interventions in an effort to give them additional relief for their pain.  .    PLAN:   - I have stressed the importance of physical activity and a home exercise plan to help with pain and improve health.  - Patient can continue with medications for now since they are providing benefits, using them appropriately, and without side effects.  - Referral placed to healthy back program  - ordered placed for lumbar MRI without contrast  - ordered placed for lumbar XR with flex/ex  - of note, he is hesitant to try interventions.  He would likely benefit most from continuing conservative management  - RTC 2-3 months to discuss interventions, if applicable at that time.  - Counseled patient regarding the importance of physical therapy.    The above plan and management options were discussed at length with patient. Patient is in agreement with the above and verbalized understanding. It will be communicated with the referring physician via electronic record, fax, or mail.    Mayte Durham  10/16/2024      I have personally reviewed the history and exam of this patient and agree with the student/resident/fellow/NPs note as stated above.  I have seen the patient and personally examined them as appropriate.  I have personally discussed the plan of care with the provider and patient and have reviewed and/or edited the plan of care as appropriate.  All questions have been answered to the best of my ability.    Elicia Martell MD PhD

## 2024-10-22 ENCOUNTER — PATIENT MESSAGE (OUTPATIENT)
Dept: PAIN MEDICINE | Facility: OTHER | Age: 59
End: 2024-10-22
Payer: COMMERCIAL

## 2024-10-26 ENCOUNTER — HOSPITAL ENCOUNTER (OUTPATIENT)
Dept: RADIOLOGY | Facility: OTHER | Age: 59
Discharge: HOME | End: 2024-10-26
Attending: STUDENT IN AN ORGANIZED HEALTH CARE EDUCATION/TRAINING PROGRAM
Payer: COMMERCIAL

## 2024-10-26 DIAGNOSIS — M47.816 LUMBAR SPONDYLOSIS: ICD-10-CM

## 2024-10-26 PROCEDURE — 72148 MRI LUMBAR SPINE W/O DYE: CPT | Mod: 26,,, | Performed by: RADIOLOGY

## 2024-10-26 PROCEDURE — 72148 MRI LUMBAR SPINE W/O DYE: CPT | Mod: TC

## 2024-11-01 ENCOUNTER — OFFICE VISIT (OUTPATIENT)
Dept: OPHTHALMOLOGY | Facility: CLINIC | Age: 59
End: 2024-11-01
Payer: COMMERCIAL

## 2024-11-01 ENCOUNTER — CLINICAL SUPPORT (OUTPATIENT)
Dept: OPHTHALMOLOGY | Facility: CLINIC | Age: 59
End: 2024-11-01
Payer: COMMERCIAL

## 2024-11-01 DIAGNOSIS — H25.811 COMBINED FORM OF AGE-RELATED CATARACT, RIGHT EYE: ICD-10-CM

## 2024-11-01 DIAGNOSIS — H04.123 DRY EYE SYNDROME OF BOTH EYES: ICD-10-CM

## 2024-11-01 DIAGNOSIS — H40.9 GLAUCOMA OF BOTH EYES, UNSPECIFIED GLAUCOMA TYPE: ICD-10-CM

## 2024-11-01 DIAGNOSIS — H25.812 COMBINED FORM OF AGE-RELATED CATARACT, LEFT EYE: ICD-10-CM

## 2024-11-01 DIAGNOSIS — H40.1121 PRIMARY OPEN ANGLE GLAUCOMA (POAG) OF LEFT EYE, MILD STAGE: ICD-10-CM

## 2024-11-01 DIAGNOSIS — H40.1113 PRIMARY OPEN ANGLE GLAUCOMA (POAG) OF RIGHT EYE, SEVERE STAGE: Primary | ICD-10-CM

## 2024-11-01 PROCEDURE — 99999 PR PBB SHADOW E&M-EST. PATIENT-LVL II: CPT | Mod: PBBFAC,,, | Performed by: STUDENT IN AN ORGANIZED HEALTH CARE EDUCATION/TRAINING PROGRAM

## 2024-11-01 RX ORDER — DORZOLAMIDE HYDROCHLORIDE AND TIMOLOL MALEATE 20; 5 MG/ML; MG/ML
1 SOLUTION/ DROPS OPHTHALMIC 2 TIMES DAILY
Qty: 10 ML | Refills: 6 | Status: SHIPPED | OUTPATIENT
Start: 2024-11-01 | End: 2025-11-01

## 2024-11-01 RX ORDER — LATANOPROST 50 UG/ML
1 SOLUTION/ DROPS OPHTHALMIC NIGHTLY
Qty: 2.5 ML | Refills: 6 | Status: SHIPPED | OUTPATIENT
Start: 2024-11-01 | End: 2025-11-01

## 2024-11-26 ENCOUNTER — OFFICE VISIT (OUTPATIENT)
Dept: OPTOMETRY | Facility: CLINIC | Age: 59
End: 2024-11-26
Payer: COMMERCIAL

## 2024-11-26 DIAGNOSIS — H52.203 MYOPIA OF BOTH EYES WITH ASTIGMATISM AND PRESBYOPIA: ICD-10-CM

## 2024-11-26 DIAGNOSIS — H52.4 MYOPIA OF BOTH EYES WITH ASTIGMATISM AND PRESBYOPIA: ICD-10-CM

## 2024-11-26 DIAGNOSIS — Z01.00 EYE EXAM, ROUTINE: Primary | ICD-10-CM

## 2024-11-26 DIAGNOSIS — H52.13 MYOPIA OF BOTH EYES WITH ASTIGMATISM AND PRESBYOPIA: ICD-10-CM

## 2024-11-26 PROCEDURE — 92002 INTRM OPH EXAM NEW PATIENT: CPT | Mod: S$GLB,,, | Performed by: OPTOMETRIST

## 2024-11-26 PROCEDURE — 99999 PR PBB SHADOW E&M-EST. PATIENT-LVL II: CPT | Mod: PBBFAC,,, | Performed by: OPTOMETRIST

## 2024-11-26 PROCEDURE — 1160F RVW MEDS BY RX/DR IN RCRD: CPT | Mod: CPTII,S$GLB,, | Performed by: OPTOMETRIST

## 2024-11-26 PROCEDURE — 1159F MED LIST DOCD IN RCRD: CPT | Mod: CPTII,S$GLB,, | Performed by: OPTOMETRIST

## 2024-11-26 PROCEDURE — 3044F HG A1C LEVEL LT 7.0%: CPT | Mod: CPTII,S$GLB,, | Performed by: OPTOMETRIST

## 2024-11-26 PROCEDURE — 92015 DETERMINE REFRACTIVE STATE: CPT | Mod: S$GLB,,, | Performed by: OPTOMETRIST

## 2024-11-26 NOTE — PROGRESS NOTES
HPI    TANIA: 11/1/24  Last DFE: ABOUT 1 YR  Chief complaint (CC): blurry VA OD  Glasses? Hasn't had them in a long time after breaking them  Contacts? no  H/o eye surgery, injections or laser:  LPI OU, Trab, Tube OD  H/o eye injury: no  Known eye conditions? Glaucoma, cataracts  Family h/o eye conditions? Glc in mother and sister  Eye gtts? no      (-) Flashes (-)  Floaters (-) Mucous   (-)  Tearing (-) Itching (-) Burning   (-) Headaches (-) Eye Pain/discomfort (-) Irritation   (-)  Redness (-) Double vision (+) Blurry vision      Diabetic? -  A1c? Lab Results       Component                Value               Date                       HGBA1C                   5.2                 09/30/2024              Last edited by Kristen Duncan on 11/26/2024  2:47 PM.            Assessment /Plan     For exam results, see Encounter Report.      Eye exam, routine   - Spectera vision     Myopia of both eyes with astigmatism and presbyopia   - New Spectacle Rx given, discussed different options for glasses.   - F/u with Dr. Dooley as scheduled

## 2024-12-27 ENCOUNTER — PATIENT MESSAGE (OUTPATIENT)
Dept: OPTOMETRY | Facility: CLINIC | Age: 59
End: 2024-12-27
Payer: COMMERCIAL

## 2025-01-27 ENCOUNTER — OFFICE VISIT (OUTPATIENT)
Dept: PAIN MEDICINE | Facility: CLINIC | Age: 60
End: 2025-01-27
Payer: COMMERCIAL

## 2025-01-27 VITALS
SYSTOLIC BLOOD PRESSURE: 184 MMHG | WEIGHT: 189.81 LBS | BODY MASS INDEX: 24.37 KG/M2 | TEMPERATURE: 99 F | DIASTOLIC BLOOD PRESSURE: 109 MMHG | HEART RATE: 86 BPM

## 2025-01-27 DIAGNOSIS — M47.816 LUMBAR SPONDYLOSIS: Primary | ICD-10-CM

## 2025-01-27 DIAGNOSIS — M54.51 VERTEBROGENIC LOW BACK PAIN: ICD-10-CM

## 2025-01-27 DIAGNOSIS — M51.360 DEGENERATION OF INTERVERTEBRAL DISC OF LUMBAR REGION WITH DISCOGENIC BACK PAIN: ICD-10-CM

## 2025-01-27 DIAGNOSIS — M46.1 SACROILIITIS: ICD-10-CM

## 2025-01-27 PROCEDURE — 1159F MED LIST DOCD IN RCRD: CPT | Mod: CPTII,S$GLB,, | Performed by: STUDENT IN AN ORGANIZED HEALTH CARE EDUCATION/TRAINING PROGRAM

## 2025-01-27 PROCEDURE — 1160F RVW MEDS BY RX/DR IN RCRD: CPT | Mod: CPTII,S$GLB,, | Performed by: STUDENT IN AN ORGANIZED HEALTH CARE EDUCATION/TRAINING PROGRAM

## 2025-01-27 PROCEDURE — G2211 COMPLEX E/M VISIT ADD ON: HCPCS | Mod: S$GLB,,, | Performed by: STUDENT IN AN ORGANIZED HEALTH CARE EDUCATION/TRAINING PROGRAM

## 2025-01-27 PROCEDURE — 99214 OFFICE O/P EST MOD 30 MIN: CPT | Mod: S$GLB,,, | Performed by: STUDENT IN AN ORGANIZED HEALTH CARE EDUCATION/TRAINING PROGRAM

## 2025-01-27 PROCEDURE — 3008F BODY MASS INDEX DOCD: CPT | Mod: CPTII,S$GLB,, | Performed by: STUDENT IN AN ORGANIZED HEALTH CARE EDUCATION/TRAINING PROGRAM

## 2025-01-27 PROCEDURE — 3077F SYST BP >= 140 MM HG: CPT | Mod: CPTII,S$GLB,, | Performed by: STUDENT IN AN ORGANIZED HEALTH CARE EDUCATION/TRAINING PROGRAM

## 2025-01-27 PROCEDURE — 99999 PR PBB SHADOW E&M-EST. PATIENT-LVL III: CPT | Mod: PBBFAC,,, | Performed by: STUDENT IN AN ORGANIZED HEALTH CARE EDUCATION/TRAINING PROGRAM

## 2025-01-27 PROCEDURE — 3080F DIAST BP >= 90 MM HG: CPT | Mod: CPTII,S$GLB,, | Performed by: STUDENT IN AN ORGANIZED HEALTH CARE EDUCATION/TRAINING PROGRAM

## 2025-01-27 NOTE — PROGRESS NOTES
"  Chronic patient Established Note (Follow up visit)      SUBJECTIVE:    INTERVAL HISTORY 1/27/2025:  Tobias Capellan presents to the clinic for a follow-up appointment for chronic pain. Current pain intensity is 0/10.  Since the last visit, Tobias Capellan states:  when he picks up his daughter, and puts her down, she notices the pain in his back (across the back, both sides).  He does feel that flexing his back tends to improve the pain.  He tends to feel worse with going back than forwards.  He states that he didn't go to the healthy back program, because he didn't find much benefit when he did it in 2017.     PRIOR HISTORY:   Tobias Capellan presents to the clinic for the evaluation of BL gluteal pain. The pain started 3 years ago following a fall and symptoms have been worsening particularly in the last year.The pain is located in a band like distribution "right on top the buttocks".  The pain is described as tight band or "sore muscle like" and is rated as 4/10. The pain is rated with a score of  2/10 on the BEST day and a score of 8/10 on the WORST day.  The pain is exacerbated by Extension > flexion, Getting out of bed/chair, and prolonged standing or prolonged sitting, coughing and sneezing.  The pain is mitigated by getting in the fetal position. Symptoms interfere with daily activity and sleeping. The patient reports 3-4 hours of uninterrupted sleep per night.    Patient denies urinary incontinence, bowel incontinence, significant weight loss, significant motor weakness, and loss of sensations.     Physical Therapy/Home Exercise: yes, completed PT for LBP in 2018. He cannot participate in HEP due to pain.     Pain Disability Index Review:      1/27/2025     4:05 PM 10/16/2024     3:45 PM 8/29/2023     3:10 PM   Last 3 PDI Scores   Pain Disability Index (PDI) 51 35 52       Pain Medications:  none     report:  Not applicable    Pain Procedures: none    Imaging:   MRI LUMBAR SPINE WITHOUT CONTRAST   "   CLINICAL HISTORY:  Low back pain, symptoms persist with > 6wks conservative treatment; Spondylosis without myelopathy or radiculopathy, lumbar region     TECHNIQUE:  Multiplanar, multisequence MR images were acquired from the thoracolumbar junction to the sacrum without the administration of contrast.     COMPARISON:  10/16/2024     FINDINGS:  Alignment: Grade 1 anterolisthesis L4 on L5.     Vertebrae: 5 lumbar-type vertebral bodies. No aggressive marrow replacement process or fracture..  Modic Changes: Type 1 (edematous endplate signal) Level: L5-S1     Discs: Disc space narrowing L5-S1     Cord: Normal. Conus terminates at L1.     Degenerative findings:     T12-L1: There is no focal disc herniation. No significant central canal narrowing . No significant neural foraminal narrowing.     L1-L2: There is no focal disc herniation. No significant central canal narrowing . No significant neural foraminal narrowing.     L2-L3: There is no focal disc herniation. No significant central canal narrowing . No significant neural foraminal narrowing.     L3-L4: There is no focal disc herniation. No significant central canal narrowing . No significant neural foraminal narrowing.     L4-L5: Broad-based disc bulge, facet degenerative change and ligamentum flavum thickening which contribute to  mild central canal narrowing .  Mild bilateral neural foraminal narrowing.     L5-S1: Broad based mild diffuse bulging of disc material .  No significant central canal narrowing .  Mild bilateral neural foraminal narrowing.     Paraspinal muscles & soft tissues: Unremarkable.     Impression:     Degenerative images of the L4-L5 and L5-S1 with grade 1 anterolisthesis L4-L5 (associated with mild central and mild bilateral neural foraminal encroachment) as well as Modic type 1 degenerative change at L5-S1 with mild bilateral neural foraminal encroachment at that level.        Electronically signed by:Cole Myers MD  Date:                                             10/27/2024  Time:                                           04:31      MRI LUMBAR SPINE WITHOUT CONTRAST 6/26/23  CLINICAL HISTORY:  Low back pain, symptoms persist with > 6wks conservative treatment; Spondylosis without myelopathy or radiculopathy, lumbar region  TECHNIQUE:  Multiplanar, multisequence MR images were acquired from the thoracolumbar junction to the sacrum without the administration of contrast.  COMPARISON:  09/30/2020     FINDINGS:  The vertebral bodies demonstrate no evidence of fracture, osseous destructive process or aggressive bone marrow replacement process.     Grade 1 anterolisthesis L4-5.  Otherwise normal sagittal alignment is preserved.     Degenerative changes individual disc levels further discussed below:     L1-2, L2-3: No significant degenerative disc disease, spinal canal stenosis or neural foraminal narrowing.     L3-4: Mild facet arthropathy.  No significant degenerative disc disease, spinal canal stenosis or neural foraminal narrowing.     L4-5: Advanced facet hypertrophy with associated anterolisthesis.  There is unroofing of the disc, mild disc bulging, mild loss of disc height, and circumferential endplate marginal osteophyte formation.  Prior left-sided ascending disc extrusion is notably improved.  Resultant modest narrowing of the overall spinal canal diameter with left greater than right lateral recess stenosis and mild left-sided neural foraminal narrowing.     L5-S1: Moderate degenerative disc disease which is progressed since the prior.  This includes loss of disc height with vacuum disc phenomenon, circumferential disc bulging, Modic type degenerative endplate marrow changes and circumferential endplate marginal osteophyte formation.  Right greater than left facet arthropathy.  No significant spinal stenosis but there is mild right lateral recess and neural foraminal encroachment.     Impression:  Notable progression of degenerative disc  disease at L5-S1 since the prior exam of 09/30/2020.  Additional moderate degenerative change at L4-5.    XR LUMBAR SPINE 5 VIEW WITH FLEX AND EXT     CLINICAL HISTORY:  Spondylosis without myelopathy or radiculopathy, lumbar region     TECHNIQUE:  Five views of the lumbar spine plus flexion extension views were performed.     COMPARISON:  Radiographs 06/01/2022     FINDINGS:  Alignment: Minimal dextroconvex curvature.  Grade 1 anterolisthesis noted at L4-L5. No dynamic instability.     Vertebrae: Vertebral body heights are maintained.  No suspicious appearing lytic or blastic lesions.     Discs and facets: Moderate disc height loss at L5-S1. Lower lumbar facet arthropathy noted.  0 2     Miscellaneous: No additional findings.     Impression:     As above.        Electronically signed by:Farshad Guillen MD  Date:                                            10/17/2024  Time:                                           08:51    XR LUMBAR SPINE AP AND LAT WITH FLEX/EXT 6/1/22  CLINICAL HISTORY:  Lower lumbar/sacroiliac pain, worse with bending over;  Low back pain, unspecified  TECHNIQUE:  AP and lateral views as well as lateral flexion and extension images are performed through the lumbar spine.     COMPARISON:  07/01/2020     FINDINGS:  The lumbar vertebra are intact.  No compression fracture is seen.  AP view shows slight curvature convex to right that may be positional.  L4-5 again shows grade 1 anterolisthesis, stable between flexion and extension.  Chronic degenerative changes are again noted.  Fourth and 5th lumbar disc spaces are narrowed.  Hypertrophic degenerative changes are seen at lower facet joints, explaining the subluxation.  Upper lumbar disc spaces are better preserved.  No obvious paraspinal lesion is detected.  Impression:  No acute abnormality.  Chronic degenerative changes in lower lumbar spine    Past Medical History:   Diagnosis Date    Glaucoma      Past Surgical History:   Procedure Laterality  Date    COLONOSCOPY N/A 10/17/2023    Procedure: COLONOSCOPY;  Surgeon: MARILUZ Saenz MD;  Location: [facility] ([address] [place] [address]);  Service: Endoscopy;  Laterality: N/A;  8/8 ref. by Mando Rice MD, PEG, instr. to portal-st  10/10-lvm for precall-MS  10/16-precall complete-KPvt    EYE SURGERY      glaucoma     Social History     Socioeconomic History    Marital status: Single   Tobacco Use    Smoking status: Never    Smokeless tobacco: Never   Substance and Sexual Activity    Alcohol use: Yes     Comment: limited    Drug use: No     Social Drivers of Health     Financial Resource Strain: Low Risk  (9/20/2024)    Overall Financial Resource Strain (CARDIA)     Difficulty of Paying Living Expenses: Not hard at all   Food Insecurity: No Food Insecurity (9/20/2024)    Hunger Vital Sign     Worried About Running Out of Food in the Last Year: Never true     Ran Out of Food in the Last Year: Never true   Physical Activity: Unknown (9/20/2024)    Exercise Vital Sign     Minutes of Exercise per Session: 30 min   Stress: Stress Concern Present (9/20/2024)    [employer] - Occupational Stress Questionnaire     Feeling of Stress : To some extent   Housing Stability: High Risk (9/20/2024)    Housing Stability Vital Sign     Unable to Pay for Housing in the Last Year: Yes     Family History   Problem Relation Name Age of Onset    Hypertension Mother      Ulcers Brother      Cancer Brother          lung    Kidney disease Brother      Heart disease Brother      Hypertension Brother         Review of patient's allergies indicates:  No Known Allergies    Current Outpatient Medications   Medication Sig    brimonidine 0.2% (ALPHAGAN) 0.2 % Drop Place 1 drop into both eyes 3 (three) times daily.    BRIMONIDINE TARTRATE/TIMOLOL (COMBIGAN OPHT) Apply to eye.    dextran 70-hypromellose (ARTIFICIAL TEARS,[id number],) ophthalmic solution Place 1 drop into both eyes every 4 (four) hours.    dorzolamide-timolol  2-0.5% (COSOPT) 22.3-6.8 mg/mL ophthalmic solution Place 1 drop into both eyes 2 (two) times daily.    latanoprost 0.005 % ophthalmic solution Place 1 drop into both eyes every evening.    azelastine (ASTELIN) 137 mcg (0.1 %) nasal spray 1 spray (137 mcg total) by Nasal route 2 (two) times daily.     No current facility-administered medications for this visit.       REVIEW OF SYSTEMS:    GENERAL:  current fevers or chills, recent use of antibiotics denies .  HEENT:  History of migraines/headaches: denies , History of major throat surgery: denies   RESPIRATORY:  History of home oxygen or pulmonary hypertension/severe breathing dysfunction: denies   CARDIOVASCULAR:  Hx of palpitations/rhythm problems: denies  Hx of Heart Attacks/Surgery: denies   GI:  Recent abdominal discomfort or recent change in bowel habits denies   MUSCULOSKELETAL:  See HPI.  SKIN:  unhealed wounds or rashes: denies   PSYCH: major psychiatric history or recent psychosocial stressors    HEMATOLOGY/LYMPHOLOGY:  Hx of prolonged bleeding, Hx of Blood thinner usage: denies   NEURO:   history of seizures, strokes, chronic/old weakness (such as paralysis or paresis of any body part): denies   All other reviewed and negative other than HPI.    OBJECTIVE:    BP (!) 184/109 (Patient Position: Sitting)   Pulse 86   Temp 98.5 °F (36.9 °C)   Wt 86.1 kg (189 lb 13.1 oz)   BMI 24.37 kg/m²     PHYSICAL EXAMINATION:  General appearance: Well appearing, in no acute distress, alert and appropriately communicative.  Psych:  Mood and affect appropriate.  Skin: Skin color, texture, turgor normal, no rashes or lesions, in both upper and lower body for exposed skin.  Head/face:  Atraumatic, normocephalic.  Pulm: non-labored breathing  GI: Abdomen non-distended and non-tender.  Back: Straight leg raising in the sitting and supine positions is negative to radicular pain. No pain to palpation over the spine or paraspinal muscles. Normal range of motion in lumbar  flexion without pain reproduction. Limited extension ROM 2/2 pain. +slump test, +milgrams, +facet loading (worst)  Extremities: No deformities, significant lymphedema, or skin discoloration. No significant open wounds. No major amputations.   Musculoskeletal:  hip and sacroiliac provocative maneuvers are negative with the exception of Right + Brendon, right + Gaenslen, + right Yeoman's. Bilateral lower extremity strength is normal and symmetric.  No atrophy or tone abnormalities are noted.  Neuro: Bilateral upper and lower extremity coordination and muscle stretch reflexes abnormalities noted: Left patella reflex 1+, right patella reflex 2+ (in the setting of left knee surgery).  Manning and/or Clonus: none; loss of sensation to light touch: none  Gait: WNL    CMP  Sodium   Date Value Ref Range Status   09/23/2024 141 136 - 145 mmol/L Final     Potassium   Date Value Ref Range Status   09/23/2024 4.1 3.5 - 5.1 mmol/L Final     Chloride   Date Value Ref Range Status   09/23/2024 111 (H) 95 - 110 mmol/L Final     CO2   Date Value Ref Range Status   09/23/2024 22 (L) 23 - 29 mmol/L Final     Glucose   Date Value Ref Range Status   09/23/2024 114 (H) 70 - 110 mg/dL Final     BUN   Date Value Ref Range Status   09/23/2024 20 6 - 20 mg/dL Final     Creatinine   Date Value Ref Range Status   09/23/2024 1.2 0.5 - 1.4 mg/dL Final     Calcium   Date Value Ref Range Status   09/23/2024 8.7 8.7 - 10.5 mg/dL Final     Total Protein   Date Value Ref Range Status   09/23/2024 6.7 6.0 - 8.4 g/dL Final     Albumin   Date Value Ref Range Status   09/23/2024 3.8 3.5 - 5.2 g/dL Final     Total Bilirubin   Date Value Ref Range Status   09/23/2024 0.4 0.1 - 1.0 mg/dL Final     Comment:     For infants and newborns, interpretation of results should be based  on gestational age, weight and in agreement with clinical  observations.    Premature Infant recommended reference ranges:  Up to 24 hours.............<8.0 mg/dL  Up to 48  hours............<12.0 mg/dL  3-5 days..................<15.0 mg/dL  6-29 days.................<15.0 mg/dL       Alkaline Phosphatase   Date Value Ref Range Status   09/23/2024 73 55 - 135 U/L Final     AST   Date Value Ref Range Status   09/23/2024 20 10 - 40 U/L Final     ALT   Date Value Ref Range Status   09/23/2024 16 10 - 44 U/L Final     Anion Gap   Date Value Ref Range Status   09/23/2024 8 8 - 16 mmol/L Final     eGFR   Date Value Ref Range Status   09/23/2024 >60.0 >60 mL/min/1.73 m^2 Final         ASSESSMENT: 59 y.o. year old male with low back pain, consistent with:    1. Lumbar spondylosis        2. Sacroiliitis        3. Degeneration of intervertebral disc of lumbar region with discogenic back pain        4. Vertebrogenic low back pain            IMPRESSION: Tobias Capellan presents today for low back pain. History and physical exam are consistent with axial / facetogenic low back pain vs discogenic pain and sacroiliac joint pain.  My independent interpretation of the imaging is consistent with facet arthropathy and degenerative disc disease associated with Modic 1 changes at L5 and S1.  We will continue with conservative management (physical therapy and non-narcotic medications). If their pain persists despite conservative measures, we will consider interventions in an effort to give them additional relief for their pain.    PLAN:   - I have stressed the importance of physical activity and a home exercise plan to help with pain and improve health.  - Patient can continue with medications for now since they are providing benefits, using them appropriately, and without side effects.  - Continue with healthy back program; referral still active  - we extensively discussed xray and MRI findings  - we discussed the option of medial branch blocks for facetogenic pain  - we discussed the option of right SI joint injection  - he may benefit from L5 and S1 intracept procedure  - of note, he is hesitant to try  interventions.  He would likely benefit most from continuing conservative management  - RTC 2-3 months to discuss interventions, if applicable at that time.  - Counseled patient regarding the importance of physical therapy.    The above plan and management options were discussed at length with patient. Patient is in agreement with the above and verbalized understanding. It will be communicated with the referring physician via electronic record, fax, or mail.    Elicia Martell MD PhD

## 2025-02-13 ENCOUNTER — OFFICE VISIT (OUTPATIENT)
Dept: OPHTHALMOLOGY | Facility: CLINIC | Age: 60
End: 2025-02-13
Payer: COMMERCIAL

## 2025-02-13 DIAGNOSIS — H25.811 COMBINED FORM OF AGE-RELATED CATARACT, RIGHT EYE: ICD-10-CM

## 2025-02-13 DIAGNOSIS — H40.1113 PRIMARY OPEN ANGLE GLAUCOMA (POAG) OF RIGHT EYE, SEVERE STAGE: Primary | ICD-10-CM

## 2025-02-13 DIAGNOSIS — H25.812 COMBINED FORM OF AGE-RELATED CATARACT, LEFT EYE: ICD-10-CM

## 2025-02-13 DIAGNOSIS — H40.1121 PRIMARY OPEN ANGLE GLAUCOMA (POAG) OF LEFT EYE, MILD STAGE: ICD-10-CM

## 2025-02-13 DIAGNOSIS — H04.123 DRY EYE SYNDROME OF BOTH EYES: ICD-10-CM

## 2025-02-13 PROCEDURE — 99999 PR PBB SHADOW E&M-EST. PATIENT-LVL II: CPT | Mod: PBBFAC,,, | Performed by: STUDENT IN AN ORGANIZED HEALTH CARE EDUCATION/TRAINING PROGRAM

## 2025-02-13 NOTE — PROGRESS NOTES
Subjective:  HPI    DLS: 11/01/2024  IOP/DFE/CCT/Optos   Pt has been out of his glaucoma drops for over a week    POAG   Cataract OU   CLEVE OU     Cosopt BID OU    Latanoprost QHS OU     Last edited by Gisele Blanco MA on 2/13/2025  3:09 PM.        Exam:  See encounter report for full exam    Assessment:  1. Primary open angle glaucoma (POAG) of right eye, severe stage  2. Primary open angle glaucoma (POAG) of left eye, mild stage  RAPD OD  - Referral from: ER follow up, previously with Dr. Sampson  - PMH: HTN, lumbar spondylosis, osteoarthritis  - Surg hx:   Trab OU elsewhere  AGV OD 2022 elsewhere  AGV OD 2012 elsewhere  - Laser hx:    LPI OU elsewhere  - Glaucoma FHx: none  -  / 575  - Gonio: some SS but scattered PAS OU (Soumya 11/2024)  - Tmax: unknown, 50s by self report  - Target IOP: low teens OD, teens OS (prelim)  - Med adverse effects: n/a    Baseline HVF 11/2024  Baseline RNFL 11/2024  Baseline photos pending    Last:  HVF: poorly reliable, SAD/IAD involving central, VFI 51 OD  reliable, full,  OS -- baseline  OCT RNFL: sup/inf thinning, avg 53 OD  blunted peaks sup<inf, avg 103 OS -- baseline    02/13/2025  IOP above target off drops- ran out  RAPD OD    3. Combined form of age-related cataract, right eye  4. Combined form of age-related cataract, left eye  May consider phaco OD then OS, but BCVA likely affected by significant glaucomatous damage     5. Dry eye syndrome of both eyes  ATs QID OU     Plan:  Young patient with advanced glaucomatous damage OD>>>OS. LTFU from Dr. Sampson since 2022.   - Restart Cosopt 2/2, Xal qhs/qhs    Today's visit is associated with current and anticipated ongoing medical care related to this patient's single serious/complex condition (glaucoma). Follow up is to be continued indefinitely to monitor the condition.    Return 1 month -- [facility], IOP, Dilate, Optos/Stereo on way out    Sheila Dooley MD  [facility]

## 2025-03-12 ENCOUNTER — OFFICE VISIT (OUTPATIENT)
Dept: OPHTHALMOLOGY | Facility: CLINIC | Age: 60
End: 2025-03-12
Payer: COMMERCIAL

## 2025-03-12 DIAGNOSIS — H40.1113 PRIMARY OPEN ANGLE GLAUCOMA (POAG) OF RIGHT EYE, SEVERE STAGE: Primary | ICD-10-CM

## 2025-03-12 DIAGNOSIS — H25.812 COMBINED FORM OF AGE-RELATED CATARACT, LEFT EYE: ICD-10-CM

## 2025-03-12 DIAGNOSIS — H25.811 COMBINED FORM OF AGE-RELATED CATARACT, RIGHT EYE: ICD-10-CM

## 2025-03-12 DIAGNOSIS — H40.1121 PRIMARY OPEN ANGLE GLAUCOMA (POAG) OF LEFT EYE, MILD STAGE: ICD-10-CM

## 2025-03-12 DIAGNOSIS — H04.123 DRY EYE SYNDROME OF BOTH EYES: ICD-10-CM

## 2025-03-12 PROCEDURE — 99999 PR PBB SHADOW E&M-EST. PATIENT-LVL II: CPT | Mod: PBBFAC,,, | Performed by: STUDENT IN AN ORGANIZED HEALTH CARE EDUCATION/TRAINING PROGRAM

## 2025-03-12 PROCEDURE — G2211 COMPLEX E/M VISIT ADD ON: HCPCS | Mod: S$GLB,,, | Performed by: STUDENT IN AN ORGANIZED HEALTH CARE EDUCATION/TRAINING PROGRAM

## 2025-03-12 PROCEDURE — 1159F MED LIST DOCD IN RCRD: CPT | Mod: CPTII,S$GLB,, | Performed by: STUDENT IN AN ORGANIZED HEALTH CARE EDUCATION/TRAINING PROGRAM

## 2025-03-12 PROCEDURE — 99214 OFFICE O/P EST MOD 30 MIN: CPT | Mod: S$GLB,,, | Performed by: STUDENT IN AN ORGANIZED HEALTH CARE EDUCATION/TRAINING PROGRAM

## 2025-03-12 PROCEDURE — 92250 FUNDUS PHOTOGRAPHY W/I&R: CPT | Mod: S$GLB,,, | Performed by: STUDENT IN AN ORGANIZED HEALTH CARE EDUCATION/TRAINING PROGRAM

## 2025-03-12 RX ORDER — BRIMONIDINE TARTRATE 2 MG/ML
1 SOLUTION/ DROPS OPHTHALMIC 2 TIMES DAILY
Qty: 10 ML | Refills: 6 | Status: SHIPPED | OUTPATIENT
Start: 2025-03-12

## 2025-03-12 NOTE — PROGRESS NOTES
Subjective:  HPI    DLS: 2/13/25 w/ Dr. Dooley    60 y.o. male presents for IOP Check w/ DFE and Optos. Patient complains of   occasional vision fluctuation. Denies eye pain, headaches, flashes. Has   floaters, stable. Patient states he cannot see at near with specs.     Cosopt BID OU    Latanoprost QHS OU     Last edited by Haleigh Santoro on 3/12/2025  2:44 PM.        Exam:  See encounter report for full exam    Assessment:  1. Primary open angle glaucoma (POAG) of right eye, severe stage  2. Primary open angle glaucoma (POAG) of left eye, mild stage  RAPD OD  - Referral from: ER follow up, previously with Dr. Sampson  - PMH: HTN, lumbar spondylosis, osteoarthritis  - Surg hx:   Trab OU elsewhere  AGV OD 2022 elsewhere  AGV OD 2012 elsewhere  - Laser hx:    LPI OU elsewhere  - Glaucoma FHx: none  -  / 575  - Gonio: some SS but scattered PAS OU (Soumya 11/2024)  - Tmax: unknown, 50s by self report  - Target IOP: low teens OD, teens OS (prelim)  - Med adverse effects: n/a    Baseline HVF 11/2024  Baseline RNFL 11/2024  Baseline photos 3/2025    Last:  HVF: poorly reliable, SAD/IAD involving central, VFI 51 OD  reliable, full,  OS -- baseline  OCT RNFL: sup/inf thinning, avg 53 OD  blunted peaks sup<inf, avg 103 OS -- baseline    03/12/2025  IOP improved but still above goal OD  Baseline photos today    3. Combined form of age-related cataract, right eye  4. Combined form of age-related cataract, left eye  May consider phaco OD then OS, but BCVA likely affected by significant glaucomatous damage     5. Dry eye syndrome of both eyes  ATs QID OU     Plan:  Young patient with advanced glaucomatous damage OD>>>OS. LTFU from Dr. Sampson since 2022.   - Continue Cosopt 2/2, Xal qhs/qhs  - Start Brim 2/0    Today's visit is associated with current and anticipated ongoing medical care related to this patient's single serious/complex condition (glaucoma). Follow up is to be continued indefinitely to  monitor the condition.    Return 6 weeks -- [facility], IOP    Sheila Dooley MD  [facility]

## 2025-04-30 ENCOUNTER — TELEPHONE (OUTPATIENT)
Dept: OPHTHALMOLOGY | Facility: CLINIC | Age: 60
End: 2025-04-30

## 2025-04-30 ENCOUNTER — OFFICE VISIT (OUTPATIENT)
Dept: OPHTHALMOLOGY | Facility: CLINIC | Age: 60
End: 2025-04-30
Payer: COMMERCIAL

## 2025-04-30 DIAGNOSIS — H04.123 DRY EYE SYNDROME OF BOTH EYES: ICD-10-CM

## 2025-04-30 DIAGNOSIS — H40.1113 PRIMARY OPEN ANGLE GLAUCOMA (POAG) OF RIGHT EYE, SEVERE STAGE: Primary | ICD-10-CM

## 2025-04-30 DIAGNOSIS — H40.1121 PRIMARY OPEN ANGLE GLAUCOMA (POAG) OF LEFT EYE, MILD STAGE: ICD-10-CM

## 2025-04-30 DIAGNOSIS — H25.812 COMBINED FORM OF AGE-RELATED CATARACT, LEFT EYE: ICD-10-CM

## 2025-04-30 DIAGNOSIS — H25.811 COMBINED FORM OF AGE-RELATED CATARACT, RIGHT EYE: ICD-10-CM

## 2025-04-30 PROCEDURE — 99999 PR PBB SHADOW E&M-EST. PATIENT-LVL II: CPT | Mod: PBBFAC,,, | Performed by: STUDENT IN AN ORGANIZED HEALTH CARE EDUCATION/TRAINING PROGRAM

## 2025-04-30 PROCEDURE — 99214 OFFICE O/P EST MOD 30 MIN: CPT | Mod: S$GLB,,, | Performed by: STUDENT IN AN ORGANIZED HEALTH CARE EDUCATION/TRAINING PROGRAM

## 2025-04-30 PROCEDURE — 1159F MED LIST DOCD IN RCRD: CPT | Mod: CPTII,S$GLB,, | Performed by: STUDENT IN AN ORGANIZED HEALTH CARE EDUCATION/TRAINING PROGRAM

## 2025-04-30 PROCEDURE — G2211 COMPLEX E/M VISIT ADD ON: HCPCS | Mod: S$GLB,,, | Performed by: STUDENT IN AN ORGANIZED HEALTH CARE EDUCATION/TRAINING PROGRAM

## 2025-04-30 NOTE — PROGRESS NOTES
Subjective:  HPI    DLS: 3/12/25 w/ Dr. Dooley    60 y.o. male presents for IOP Check. Patient complains of occasional   vision fluctuation. Denies eye pain, headaches, flashes. Has floaters,   stable. Patient states he cannot see at near with specs.     Cosopt BID OU   BRIM BID OD   Latanoprost QHS OU     Last edited by Wanda Dupont on 4/30/2025  3:13 PM.        Exam:  See encounter report for full exam    Assessment:  1. Primary open angle glaucoma (POAG) of right eye, severe stage  2. Primary open angle glaucoma (POAG) of left eye, mild stage  RAPD OD  - Referral from: ER follow up, previously with Dr. Sampson  - PMH: HTN, lumbar spondylosis, osteoarthritis  - Surg hx:   Trab OU elsewhere  AGV OD 2022 elsewhere  AGV OD 2012 elsewhere  - Laser hx:    LPI OU elsewhere  - Glaucoma FHx: none  -  / 575  - Gonio: some SS but scattered PAS OU (Soumya 11/2024)  - Tmax: unknown, 50s by self report  - Target IOP: low teens OD, teens OS (prelim)  - Med adverse effects: n/a    Baseline HVF 11/2024  Baseline RNFL 11/2024  Baseline photos 3/2025    Last:  HVF: poorly reliable, SAD/IAD involving central, VFI 51 OD  reliable, full,  OS -- baseline  OCT RNFL: sup/inf thinning, avg 53 OD  blunted peaks sup<inf, avg 103 OS -- baseline    04/30/2025  IOP improved on new regimen  Motivated for as much visual improvement as possible -- does have denser cataract    3. Combined form of age-related cataract, right eye  4. Combined form of age-related cataract, left eye  May consider phaco OD then OS, cataract OD>OS  BCVA likely affected by significant glaucomatous damage discussed    5. Dry eye syndrome of both eyes  ATs QID OU     Plan:  Young patient with advanced glaucomatous damage OD>>>OS. LTFU from Dr. Sampson since 2022.     IOP improved. Interested in phaco to improve VA as much as possible. Does not want to wait until I return from maternity. See KL given overhanging blebs.  - Continue Cosopt 2/2, Xal  qhs/qhs, Brim 2/0    Today's visit is associated with current and anticipated ongoing medical care related to this patient's single serious/complex condition (glaucoma). Follow up is to be continued indefinitely to monitor the condition.    Return KL next avail cat eval  Me when I return    Sheila Dooley MD  [facility]

## 2025-04-30 NOTE — TELEPHONE ENCOUNTER
----- Message from Tech Wanda sent at 4/30/2025  3:33 PM CDT -----  Regarding: appt  Pt wants cat surgery before Dr Dooley comes back from maternity leave. He would need Dr. Renae because he has tubes. Thanks.

## 2025-05-01 ENCOUNTER — TELEPHONE (OUTPATIENT)
Dept: OPHTHALMOLOGY | Facility: CLINIC | Age: 60
End: 2025-05-01
Payer: COMMERCIAL

## 2025-05-01 ENCOUNTER — OFFICE VISIT (OUTPATIENT)
Dept: PAIN MEDICINE | Facility: CLINIC | Age: 60
End: 2025-05-01
Payer: COMMERCIAL

## 2025-05-01 VITALS
HEIGHT: 74 IN | TEMPERATURE: 98 F | SYSTOLIC BLOOD PRESSURE: 156 MMHG | OXYGEN SATURATION: 100 % | HEART RATE: 58 BPM | WEIGHT: 187.38 LBS | DIASTOLIC BLOOD PRESSURE: 86 MMHG | BODY MASS INDEX: 24.05 KG/M2 | RESPIRATION RATE: 18 BRPM

## 2025-05-01 DIAGNOSIS — M47.816 LUMBAR SPONDYLOSIS: Primary | ICD-10-CM

## 2025-05-01 DIAGNOSIS — M46.1 SACROILIITIS: ICD-10-CM

## 2025-05-01 DIAGNOSIS — M51.360 DEGENERATION OF INTERVERTEBRAL DISC OF LUMBAR REGION WITH DISCOGENIC BACK PAIN: ICD-10-CM

## 2025-05-01 PROCEDURE — G2211 COMPLEX E/M VISIT ADD ON: HCPCS | Mod: S$GLB,,, | Performed by: STUDENT IN AN ORGANIZED HEALTH CARE EDUCATION/TRAINING PROGRAM

## 2025-05-01 PROCEDURE — 99214 OFFICE O/P EST MOD 30 MIN: CPT | Mod: S$GLB,,, | Performed by: STUDENT IN AN ORGANIZED HEALTH CARE EDUCATION/TRAINING PROGRAM

## 2025-05-01 PROCEDURE — 3079F DIAST BP 80-89 MM HG: CPT | Mod: CPTII,S$GLB,, | Performed by: STUDENT IN AN ORGANIZED HEALTH CARE EDUCATION/TRAINING PROGRAM

## 2025-05-01 PROCEDURE — 1160F RVW MEDS BY RX/DR IN RCRD: CPT | Mod: CPTII,S$GLB,, | Performed by: STUDENT IN AN ORGANIZED HEALTH CARE EDUCATION/TRAINING PROGRAM

## 2025-05-01 PROCEDURE — 1159F MED LIST DOCD IN RCRD: CPT | Mod: CPTII,S$GLB,, | Performed by: STUDENT IN AN ORGANIZED HEALTH CARE EDUCATION/TRAINING PROGRAM

## 2025-05-01 PROCEDURE — 99999 PR PBB SHADOW E&M-EST. PATIENT-LVL IV: CPT | Mod: PBBFAC,,, | Performed by: STUDENT IN AN ORGANIZED HEALTH CARE EDUCATION/TRAINING PROGRAM

## 2025-05-01 PROCEDURE — 3077F SYST BP >= 140 MM HG: CPT | Mod: CPTII,S$GLB,, | Performed by: STUDENT IN AN ORGANIZED HEALTH CARE EDUCATION/TRAINING PROGRAM

## 2025-05-01 PROCEDURE — 3008F BODY MASS INDEX DOCD: CPT | Mod: CPTII,S$GLB,, | Performed by: STUDENT IN AN ORGANIZED HEALTH CARE EDUCATION/TRAINING PROGRAM

## 2025-05-01 NOTE — TELEPHONE ENCOUNTER
----- Message from Carmen sent at 5/1/2025  2:21 PM CDT -----  Who Called:self  Who Left Message for Patient:marco a charli Does the patient know what this is regarding?:no  Would the patient rather a call back or a response via [facility]?callback  Best Call Back Number:.[phone] Additional Information:

## 2025-05-01 NOTE — PROGRESS NOTES
"  Chronic patient Established Note (Follow up visit)      SUBJECTIVE:  INTERVAL HISTORY 5/1/2025:  Mr. Capellan returns for lower back/bilateral buttock pain. Current Pain level is 0-1/10.  He reports that he occasionally has more pain with certain movements (such as putting on pants).  He feels the pain gets worse with certain movements (such as lifting his children).  He feels that his back is not in alignment and is interested in consideration for chiropractic care.    INTERVAL HISTORY 1/27/2025:  Tobias Capellan presents to the clinic for a follow-up appointment for chronic pain. Current pain intensity is 0/10.  Since the last visit, Tobias Capellan states:  when he picks up his daughter, and puts her down, she notices the pain in his back (across the back, both sides).  He does feel that flexing his back tends to improve the pain.  He tends to feel worse with going back than forwards.  He states that he didn't go to the healthy back program, because he didn't find much benefit when he did it in 2017.     PRIOR HISTORY:   Tobias Capellan presents to the clinic for the evaluation of BL gluteal pain. The pain started 3 years ago following a fall and symptoms have been worsening particularly in the last year.The pain is located in a band like distribution "right on top the buttocks".  The pain is described as tight band or "sore muscle like" and is rated as 4/10. The pain is rated with a score of  2/10 on the BEST day and a score of 8/10 on the WORST day.  The pain is exacerbated by Extension > flexion, Getting out of bed/chair, and prolonged standing or prolonged sitting, coughing and sneezing.  The pain is mitigated by getting in the fetal position. Symptoms interfere with daily activity and sleeping. The patient reports 3-4 hours of uninterrupted sleep per night.    Patient denies urinary incontinence, bowel incontinence, significant weight loss, significant motor weakness, and loss of sensations.     Physical " Therapy/Home Exercise: yes, completed PT for LBP in 2018. He cannot participate in HEP due to pain.     Pain Disability Index Review:      5/1/2025     3:19 PM 1/27/2025     4:05 PM 10/16/2024     3:45 PM   Last 3 PDI Scores   Pain Disability Index (PDI) 25 51 35       Pain Medications:  none     report:  Not applicable    Pain Procedures: none    Imaging:   XR HIPS BILATERAL 2 VIEW INCL AP PELVIS     CLINICAL HISTORY:  Low back pain     TECHNIQUE:  AP view of the pelvis and frogleg lateral views of both hips were performed.     COMPARISON:  None.     FINDINGS:  Mild DJD.  No fracture or dislocation.  No bone destruction identified.     Impression:     See above        Electronically signed by:Jasbir Severino MD  Date:                                            07/01/2020  Time:                                           14:02    MRI LUMBAR SPINE WITHOUT CONTRAST     CLINICAL HISTORY:  Low back pain, symptoms persist with > 6wks conservative treatment; Spondylosis without myelopathy or radiculopathy, lumbar region     TECHNIQUE:  Multiplanar, multisequence MR images were acquired from the thoracolumbar junction to the sacrum without the administration of contrast.     COMPARISON:  10/16/2024     FINDINGS:  Alignment: Grade 1 anterolisthesis L4 on L5.     Vertebrae: 5 lumbar-type vertebral bodies. No aggressive marrow replacement process or fracture..  Modic Changes: Type 1 (edematous endplate signal) Level: L5-S1     Discs: Disc space narrowing L5-S1     Cord: Normal. Conus terminates at L1.     Degenerative findings:     T12-L1: There is no focal disc herniation. No significant central canal narrowing . No significant neural foraminal narrowing.     L1-L2: There is no focal disc herniation. No significant central canal narrowing . No significant neural foraminal narrowing.     L2-L3: There is no focal disc herniation. No significant central canal narrowing . No significant neural foraminal narrowing.     L3-L4:  There is no focal disc herniation. No significant central canal narrowing . No significant neural foraminal narrowing.     L4-L5: Broad-based disc bulge, facet degenerative change and ligamentum flavum thickening which contribute to  mild central canal narrowing .  Mild bilateral neural foraminal narrowing.     L5-S1: Broad based mild diffuse bulging of disc material .  No significant central canal narrowing .  Mild bilateral neural foraminal narrowing.     Paraspinal muscles & soft tissues: Unremarkable.     Impression:     Degenerative images of the L4-L5 and L5-S1 with grade 1 anterolisthesis L4-L5 (associated with mild central and mild bilateral neural foraminal encroachment) as well as Modic type 1 degenerative change at L5-S1 with mild bilateral neural foraminal encroachment at that level.        Electronically signed by:Cole Myers MD  Date:                                            10/27/2024  Time:                                           04:31      MRI LUMBAR SPINE WITHOUT CONTRAST 6/26/23  CLINICAL HISTORY:  Low back pain, symptoms persist with > 6wks conservative treatment; Spondylosis without myelopathy or radiculopathy, lumbar region  TECHNIQUE:  Multiplanar, multisequence MR images were acquired from the thoracolumbar junction to the sacrum without the administration of contrast.  COMPARISON:  09/30/2020     FINDINGS:  The vertebral bodies demonstrate no evidence of fracture, osseous destructive process or aggressive bone marrow replacement process.     Grade 1 anterolisthesis L4-5.  Otherwise normal sagittal alignment is preserved.     Degenerative changes individual disc levels further discussed below:     L1-2, L2-3: No significant degenerative disc disease, spinal canal stenosis or neural foraminal narrowing.     L3-4: Mild facet arthropathy.  No significant degenerative disc disease, spinal canal stenosis or neural foraminal narrowing.     L4-5: Advanced facet hypertrophy with associated  anterolisthesis.  There is unroofing of the disc, mild disc bulging, mild loss of disc height, and circumferential endplate marginal osteophyte formation.  Prior left-sided ascending disc extrusion is notably improved.  Resultant modest narrowing of the overall spinal canal diameter with left greater than right lateral recess stenosis and mild left-sided neural foraminal narrowing.     L5-S1: Moderate degenerative disc disease which is progressed since the prior.  This includes loss of disc height with vacuum disc phenomenon, circumferential disc bulging, Modic type degenerative endplate marrow changes and circumferential endplate marginal osteophyte formation.  Right greater than left facet arthropathy.  No significant spinal stenosis but there is mild right lateral recess and neural foraminal encroachment.     Impression:  Notable progression of degenerative disc disease at L5-S1 since the prior exam of 09/30/2020.  Additional moderate degenerative change at L4-5.    XR LUMBAR SPINE 5 VIEW WITH FLEX AND EXT     CLINICAL HISTORY:  Spondylosis without myelopathy or radiculopathy, lumbar region     TECHNIQUE:  Five views of the lumbar spine plus flexion extension views were performed.     COMPARISON:  Radiographs 06/01/2022     FINDINGS:  Alignment: Minimal dextroconvex curvature.  Grade 1 anterolisthesis noted at L4-L5. No dynamic instability.     Vertebrae: Vertebral body heights are maintained.  No suspicious appearing lytic or blastic lesions.     Discs and facets: Moderate disc height loss at L5-S1. Lower lumbar facet arthropathy noted.  0 2     Miscellaneous: No additional findings.     Impression:     As above.        Electronically signed by:Farshad Guillen MD  Date:                                            10/17/2024  Time:                                           08:51    XR LUMBAR SPINE AP AND LAT WITH FLEX/EXT 6/1/22  CLINICAL HISTORY:  Lower lumbar/sacroiliac pain, worse with bending over;  Low back  pain, unspecified  TECHNIQUE:  AP and lateral views as well as lateral flexion and extension images are performed through the lumbar spine.     COMPARISON:  07/01/2020     FINDINGS:  The lumbar vertebra are intact.  No compression fracture is seen.  AP view shows slight curvature convex to right that may be positional.  L4-5 again shows grade 1 anterolisthesis, stable between flexion and extension.  Chronic degenerative changes are again noted.  Fourth and 5th lumbar disc spaces are narrowed.  Hypertrophic degenerative changes are seen at lower facet joints, explaining the subluxation.  Upper lumbar disc spaces are better preserved.  No obvious paraspinal lesion is detected.  Impression:  No acute abnormality.  Chronic degenerative changes in lower lumbar spine    Past Medical History:   Diagnosis Date    Glaucoma      Past Surgical History:   Procedure Laterality Date    COLONOSCOPY N/A 10/17/2023    Procedure: COLONOSCOPY;  Surgeon: MARILUZ Saenz MD;  Location: [facility] ([address] [place] [address]);  Service: Endoscopy;  Laterality: N/A;  8/8 ref. by Mando Rice MD, PEG, instr. to portal-st  10/10-lvm for precall-MS  10/16-precall complete-KPvt    EYE SURGERY      glaucoma     Social History     Socioeconomic History    Marital status: Single   Tobacco Use    Smoking status: Never    Smokeless tobacco: Never   Substance and Sexual Activity    Alcohol use: Yes     Comment: limited    Drug use: No     Social Drivers of Health     Financial Resource Strain: Low Risk  (3/9/2025)    Overall Financial Resource Strain (CARDIA)     Difficulty of Paying Living Expenses: Not very hard   Food Insecurity: Food Insecurity Present (3/9/2025)    Hunger Vital Sign     Worried About Running Out of Food in the Last Year: Sometimes true     Ran Out of Food in the Last Year: Sometimes true   Transportation Needs: Unmet Transportation Needs (3/9/2025)    PRAPARE - Transportation     Lack of Transportation (Medical): No     Lack of Transportation  (Non-Medical): Yes   Physical Activity: Insufficiently Active (3/9/2025)    Exercise Vital Sign     Days of Exercise per Week: 2 days     Minutes of Exercise per Session: 20 min   Stress: Stress Concern Present (3/9/2025)    [employer] - Occupational Stress Questionnaire     Feeling of Stress : To some extent   Housing Stability: Low Risk  (3/9/2025)    Housing Stability Vital Sign     Unable to Pay for Housing in the Last Year: No     Number of Times Moved in the Last Year: 0     Homeless in the Last Year: No     Family History   Problem Relation Name Age of Onset    Hypertension Mother      Ulcers Brother      Cancer Brother          lung    Kidney disease Brother      Heart disease Brother      Hypertension Brother         Review of patient's allergies indicates:  No Known Allergies    Current Outpatient Medications   Medication Sig    brimonidine 0.2% (ALPHAGAN) 0.2 % Drop Place 1 drop into the right eye 2 (two) times a day.    BRIMONIDINE TARTRATE/TIMOLOL (COMBIGAN OPHT) Apply to eye.    dextran 70-hypromellose (ARTIFICIAL TEARS,[id number],) ophthalmic solution Place 1 drop into both eyes every 4 (four) hours.    dorzolamide-timolol 2-0.5% (COSOPT) 22.3-6.8 mg/mL ophthalmic solution Place 1 drop into both eyes 2 (two) times daily.    latanoprost 0.005 % ophthalmic solution Place 1 drop into both eyes every evening.    azelastine (ASTELIN) 137 mcg (0.1 %) nasal spray 1 spray (137 mcg total) by Nasal route 2 (two) times daily.     No current facility-administered medications for this visit.       REVIEW OF SYSTEMS:    GENERAL:  current fevers or chills, recent use of antibiotics denies .  HEENT:  History of migraines/headaches: denies , History of major throat surgery: denies   RESPIRATORY:  History of home oxygen or pulmonary hypertension/severe breathing dysfunction: denies   CARDIOVASCULAR:  Hx of palpitations/rhythm problems: denies  Hx of Heart Attacks/Surgery: denies   GI:   "Recent abdominal discomfort or recent change in bowel habits denies   MUSCULOSKELETAL:  See HPI.  SKIN:  unhealed wounds or rashes: denies   PSYCH: major psychiatric history or recent psychosocial stressors    HEMATOLOGY/LYMPHOLOGY:  Hx of prolonged bleeding, Hx of Blood thinner usage: denies   NEURO:   history of seizures, strokes, chronic/old weakness (such as paralysis or paresis of any body part): denies   All other reviewed and negative other than HPI.    OBJECTIVE:    BP (!) 156/86 (BP Location: Right arm, Patient Position: Sitting)   Pulse (!) 58   Temp 98 °F (36.7 °C) (Oral)   Resp 18   Ht 6' 2" (1.88 m)   Wt 85 kg (187 lb 6.3 oz)   SpO2 100%   BMI 24.06 kg/m²     PHYSICAL EXAMINATION:  General appearance: Well appearing, in no acute distress, alert and appropriately communicative.  Psych:  Mood and affect appropriate.  Skin: Skin color, texture, turgor normal, no rashes or lesions, in both upper and lower body for exposed skin.  Head/face:  Atraumatic, normocephalic.  Pulm: non-labored breathing  GI: Abdomen non-distended and non-tender.  Back: Straight leg raising in the sitting and supine positions is negative to radicular pain. No pain to palpation over the spine or paraspinal muscles. Normal range of motion in lumbar flexion without pain reproduction. Limited extension ROM 2/2 pain. +slump test, +milgrams, +facet loading (worst)  Extremities: No deformities, significant lymphedema, or skin discoloration. No significant open wounds. No major amputations.   Musculoskeletal:  hip and sacroiliac provocative maneuvers are negative with the exception of Right + Brendon, right FADIR +, +RIGHT LOG ROLL, right + [place], + right Yeoman's. Bilateral lower extremity strength is normal and symmetric.  No atrophy or tone abnormalities are noted.  Neuro: Bilateral upper and lower extremity coordination and muscle stretch reflexes abnormalities noted: Left patella reflex 1+, right patella reflex 2+ (in the " setting of left knee surgery).  Manning and/or Clonus: none; loss of sensation to light touch: none  Gait: WNL    CMP  Sodium   Date Value Ref Range Status   09/23/2024 141 136 - 145 mmol/L Final     Potassium   Date Value Ref Range Status   09/23/2024 4.1 3.5 - 5.1 mmol/L Final     Chloride   Date Value Ref Range Status   09/23/2024 111 (H) 95 - 110 mmol/L Final     CO2   Date Value Ref Range Status   09/23/2024 22 (L) 23 - 29 mmol/L Final     Glucose   Date Value Ref Range Status   09/23/2024 114 (H) 70 - 110 mg/dL Final     BUN   Date Value Ref Range Status   09/23/2024 20 6 - 20 mg/dL Final     Creatinine   Date Value Ref Range Status   09/23/2024 1.2 0.5 - 1.4 mg/dL Final     Calcium   Date Value Ref Range Status   09/23/2024 8.7 8.7 - 10.5 mg/dL Final     Total Protein   Date Value Ref Range Status   09/23/2024 6.7 6.0 - 8.4 g/dL Final     Albumin   Date Value Ref Range Status   09/23/2024 3.8 3.5 - 5.2 g/dL Final     Total Bilirubin   Date Value Ref Range Status   09/23/2024 0.4 0.1 - 1.0 mg/dL Final     Comment:     For infants and newborns, interpretation of results should be based  on gestational age, weight and in agreement with clinical  observations.    Premature Infant recommended reference ranges:  Up to 24 hours.............<8.0 mg/dL  Up to 48 hours............<12.0 mg/dL  3-5 days..................<15.0 mg/dL  6-29 days.................<15.0 mg/dL       Alkaline Phosphatase   Date Value Ref Range Status   09/23/2024 73 55 - 135 U/L Final     AST   Date Value Ref Range Status   09/23/2024 20 10 - 40 U/L Final     ALT   Date Value Ref Range Status   09/23/2024 16 10 - 44 U/L Final     Anion Gap   Date Value Ref Range Status   09/23/2024 8 8 - 16 mmol/L Final     eGFR   Date Value Ref Range Status   09/23/2024 >60.0 >60 mL/min/1.73 m^2 Final         ASSESSMENT: 60 y.o. year old male with low back pain, consistent with:    1. Lumbar spondylosis  Ambulatory referral/consult to Chiropractic      2.  Degeneration of intervertebral disc of lumbar region with discogenic back pain        3. Sacroiliitis          IMPRESSION: Tobias Capellan presents today for low back pain. History and physical exam are consistent with axial / facetogenic low back pain vs discogenic pain and sacroiliac joint pain.  My independent interpretation of the imaging is consistent with facet arthropathy and degenerative disc disease associated with Modic 1 changes at L5 and S1.  We will continue with conservative management (physical therapy and non-narcotic medications). If their pain persists despite conservative measures, we will consider interventions in an effort to give them additional relief for their pain.    PLAN:   - I have stressed the importance of physical activity and a home exercise plan to help with pain and improve health.  - Patient can continue with medications for now since they are providing benefits, using them appropriately, and without side effects.  - Continue with healthy back program; referral still active  - we extensively discussed xray and MRI findings  - we discussed the utility of anti-inflammatory diet  - he prefers a consult to chiropractic. Referral placed  - we discussed the option of medial branch blocks for facetogenic pain  - we discussed the option of right SI joint injection  - he may benefit from L5 and S1 intracept procedure  - of note, he is hesitant to try interventions.  He would likely benefit most from continuing conservative management  - RTC as needed to discuss interventions, if applicable at that time.  - Counseled patient regarding the importance of physical therapy.    The above plan and management options were discussed at length with patient. Patient is in agreement with the above and verbalized understanding. It will be communicated with the referring physician via electronic record, fax, or mail.    Elicia Martell MD PhD

## 2025-05-01 NOTE — TELEPHONE ENCOUNTER
I spoke with pt to schedule appt with  to discuss cataract sx per . However, pt did not want to pay another copay to see doctor. Pt advised that this would be a new pt evaluation since he has never seen  and that he would have a copay. Pt was upset and stated he will call us back and hung up

## 2025-08-08 ENCOUNTER — OFFICE VISIT (OUTPATIENT)
Dept: INTERNAL MEDICINE | Facility: CLINIC | Age: 60
End: 2025-08-08
Payer: COMMERCIAL

## 2025-08-08 ENCOUNTER — TELEPHONE (OUTPATIENT)
Dept: INTERNAL MEDICINE | Facility: CLINIC | Age: 60
End: 2025-08-08

## 2025-08-08 VITALS
OXYGEN SATURATION: 98 % | BODY MASS INDEX: 23.29 KG/M2 | DIASTOLIC BLOOD PRESSURE: 88 MMHG | HEART RATE: 63 BPM | WEIGHT: 181.44 LBS | SYSTOLIC BLOOD PRESSURE: 156 MMHG | HEIGHT: 74 IN

## 2025-08-08 DIAGNOSIS — G89.29 CHRONIC BILATERAL LOW BACK PAIN WITHOUT SCIATICA: Primary | ICD-10-CM

## 2025-08-08 DIAGNOSIS — I10 ESSENTIAL HYPERTENSION: ICD-10-CM

## 2025-08-08 DIAGNOSIS — M54.50 CHRONIC BILATERAL LOW BACK PAIN WITHOUT SCIATICA: Primary | ICD-10-CM

## 2025-08-08 DIAGNOSIS — Z12.5 SCREENING PSA (PROSTATE SPECIFIC ANTIGEN): ICD-10-CM

## 2025-08-08 PROCEDURE — 99999 PR PBB SHADOW E&M-EST. PATIENT-LVL IV: CPT | Mod: PBBFAC,,, | Performed by: STUDENT IN AN ORGANIZED HEALTH CARE EDUCATION/TRAINING PROGRAM

## 2025-08-08 RX ORDER — GABAPENTIN 100 MG/1
100 CAPSULE ORAL 3 TIMES DAILY
Qty: 90 CAPSULE | Refills: 2 | Status: CANCELLED | OUTPATIENT
Start: 2025-08-08 | End: 2026-08-08

## 2025-08-08 NOTE — LETTER
August 8, 2025      [facility]  [address]  [facility] [address]  Phone: [phone]  Fax: [phone]       Patient: Tobias Capellan   YOB: 1965  Date of Visit: 08/08/2025    To Whom It May Concern:    Crystal Capellan  was at [facility] on 08/08/2025. He may return to work on 08/11/25 with no restrictions. If you have any questions or concerns, or if I can be of further assistance, please do not hesitate to contact me.    Sincerely,    Brady Holden MD  Family Medicine  [facility]  08/08/2025

## 2025-08-08 NOTE — PROGRESS NOTES
"SUBJECTIVE     Chief Complaint   Patient presents with    Back Pain     Lower back pain (come and go)       HPI  Tobias Capellan is a 60 y.o. male with HTN that presents for evaluation of lower back pain.     This is a new patient to me but established to [facility]. PCP is Mando Rice MD.     LOWER BACK PAIN:  He reports chronic lower back pain localized to the top portion of buttocks and lower back, affecting both sides. Pain is exacerbated with sneezing, causing significant discomfort where he is apprehensive about sneezing. Pain pattern is dynamic, initially on the right side and now moving across to the left side. He notes the pain has elevated in location recently. He denies radiation down the legs. Currently managing pain with a back brace despite discomfort. He has a history of pain management consultations where surgical interventions and physical therapy were previously discussed but not pursued. He denies recent accidents or injuries contributing to current back pain. He states that what he would benefit from is "cracking" of his back. He was referred to a chiropractor by his pain management physician, but did not set up appointment. He has been prescribed gabapentin and flexeril in the past, but patient did not start medications.     MRI of lumbar spine taken in 10/2024: Degenerative images of the L4-L5 and L5-S1 with grade 1 anterolisthesis L4-L5 (associated with mild central and mild bilateral neural foraminal encroachment) as well as Modic type 1 degenerative change at L5-S1 with mild bilateral neural foraminal encroachment at that level.     RECENT ILLNESS:  He reports illness two days ago with headache and fever, which resolved with self-treatment using Cathy-Hayes Plus and Theraflu. He experienced sweating during the illness and has since recovered.    HYPERTENSION:  He self-monitors blood pressure at home with readings consistently around 143 systolic. He is not currently on any blood " "pressure medications and understands target blood pressure should be below 140/90. Patient does not want to start medication. He states that "there is no reason for someone like me who weight 180 lbs to have the same blood pressure as someone who weighs 125 lbs." He references this belief to an article he read that said "when they tried to lower someone's blood pressure it made them feel bad."       Patient also voices interest in checking blood work that would screen for cancer causing his symptoms. He is due for PSA check in September.     PAST MEDICAL HISTORY:  Past Medical History:   Diagnosis Date    Glaucoma        PAST SURGICAL HISTORY:  Past Surgical History:   Procedure Laterality Date    COLONOSCOPY N/A 10/17/2023    Procedure: COLONOSCOPY;  Surgeon: MARILUZ Saenz MD;  Location: [facility] ([address] [place] [address]);  Service: Endoscopy;  Laterality: N/A;  8/8 ref. by Mando Rice MD, PEG, instr. to portal-st  10/10-lvm for precall-MS  10/16-precall complete-KPvt    EYE SURGERY      glaucoma       FAMILY HISTORY:  Family History   Problem Relation Name Age of Onset    Hypertension Mother      Ulcers Brother      Cancer Brother          lung    Kidney disease Brother      Heart disease Brother      Hypertension Brother         ALLERGIES AND MEDICATIONS: updated and reviewed.  Review of patient's allergies indicates:  No Known Allergies  Current Medications[1]    ROS  Review of Systems   Musculoskeletal:  Positive for arthralgias and back pain. Negative for gait problem.   Neurological:  Negative for weakness and numbness.         OBJECTIVE     Physical Exam  Vitals:    08/08/25 0810   BP: (!) 156/88   Pulse: 63    Body mass index is 23.3 kg/m².  Weight: 82.3 kg (181 lb 7 oz)   Height: 6' 2" (188 cm)     Physical Exam  Vitals reviewed.   Constitutional:       General: He is not in acute distress.     Appearance: Normal appearance.   HENT:      Head: Normocephalic and atraumatic.      Mouth/Throat:      Mouth: Mucous " membranes are moist.      Pharynx: Oropharynx is clear.   Eyes:      Extraocular Movements: Extraocular movements intact.      Conjunctiva/sclera: Conjunctivae normal.      Pupils: Pupils are equal, round, and reactive to light.   Cardiovascular:      Rate and Rhythm: Normal rate.      Pulses: Normal pulses.   Pulmonary:      Effort: Pulmonary effort is normal.   Abdominal:      General: There is no distension.   Musculoskeletal:         General: Normal range of motion.      Cervical back: Normal range of motion.   Skin:     General: Skin is warm and dry.   Neurological:      General: No focal deficit present.      Mental Status: He is alert.           ASSESSMENT     60 y.o. male with     1. Chronic bilateral low back pain without sciatica    2. Essential hypertension    3. Screening PSA (prostate specific antigen)        PLAN:     1. Chronic bilateral low back pain without sciatica  - Patient has followed with [facility] in the past. He is hesitant about medications and procedural interventions. Spoke with patient about OMM, which patient is interested in. I will place a referral to sports medicine physician, Ivet Solitario DO for evaluation for possible OMM.   - Ambulatory referral/consult to Sports Medicine; Future    2. Essential hypertension  - Patient not interested in medications.   - Counseled patient about risks of untreated hypertension including heart disease, strokes, renal failure, blindness. Patient verbalized understanding, but still not interested in starting medication.     3. Screening PSA (prostate specific antigen)  - PSA, SCREENING; Future      RTC PRN, follow up with his PCP recommended.        Brady Holden MD  Family Medicine  [facility]  08/08/2025    This document was created using voice recognition software (M*Modal Fluency Direct). Although it may be edited, this document may contain errors related to incorrect recognition of the  spoken word. Please call the physician if clarification is needed.       Follow up if symptoms worsen or fail to improve.                       [1]   Current Outpatient Medications   Medication Sig Dispense Refill    brimonidine 0.2% (ALPHAGAN) 0.2 % Drop Place 1 drop into the right eye 2 (two) times a day. 10 mL 6    BRIMONIDINE TARTRATE/TIMOLOL (COMBIGAN OPHT) Apply to eye.      dextran 70-hypromellose (ARTIFICIAL TEARS,HSTH98-SLHNO,) ophthalmic solution Place 1 drop into both eyes every 4 (four) hours. 30 mL 0    dorzolamide-timolol 2-0.5% (COSOPT) 22.3-6.8 mg/mL ophthalmic solution Place 1 drop into both eyes 2 (two) times daily. 10 mL 6    latanoprost 0.005 % ophthalmic solution Place 1 drop into both eyes every evening. 2.5 mL 6    azelastine (ASTELIN) 137 mcg (0.1 %) nasal spray 1 spray (137 mcg total) by Nasal route 2 (two) times daily. 30 mL 0     No current facility-administered medications for this visit.

## 2025-08-08 NOTE — TELEPHONE ENCOUNTER
Copied from CRM #[id number]. Topic: General Inquiry - Patient Advice  >> Aug 8, 2025  7:56 LUCY Horner wrote:  Type:  Needs Medical Advice    Who Called: [facility]   Symptoms (please be specific):    How long has patient had these symptoms:    Pharmacy name and phone #:    Would the patient rather a call back or a response via [facility]? Call back   Best Call Back Number: Patient/[phone]  Additional Information: Jaylyn called to let the staff know that pt went to [facility] by accident and is on his way to the office

## 2025-08-13 ENCOUNTER — OFFICE VISIT (OUTPATIENT)
Dept: ORTHOPEDICS | Facility: CLINIC | Age: 60
End: 2025-08-13
Payer: COMMERCIAL

## 2025-08-13 VITALS — WEIGHT: 181.44 LBS | BODY MASS INDEX: 23.29 KG/M2 | HEIGHT: 74 IN

## 2025-08-13 DIAGNOSIS — M54.50 CHRONIC BILATERAL LOW BACK PAIN WITHOUT SCIATICA: ICD-10-CM

## 2025-08-13 DIAGNOSIS — G89.29 CHRONIC BILATERAL LOW BACK PAIN WITHOUT SCIATICA: ICD-10-CM

## 2025-08-13 PROCEDURE — 99204 OFFICE O/P NEW MOD 45 MIN: CPT | Mod: S$GLB,,, | Performed by: ORTHOPAEDIC SURGERY

## 2025-08-13 PROCEDURE — 1159F MED LIST DOCD IN RCRD: CPT | Mod: CPTII,S$GLB,, | Performed by: ORTHOPAEDIC SURGERY

## 2025-08-13 PROCEDURE — 3008F BODY MASS INDEX DOCD: CPT | Mod: CPTII,S$GLB,, | Performed by: ORTHOPAEDIC SURGERY

## 2025-08-13 PROCEDURE — 99999 PR PBB SHADOW E&M-EST. PATIENT-LVL III: CPT | Mod: PBBFAC,,, | Performed by: ORTHOPAEDIC SURGERY
